# Patient Record
Sex: MALE | Race: WHITE | NOT HISPANIC OR LATINO | Employment: FULL TIME | URBAN - METROPOLITAN AREA
[De-identification: names, ages, dates, MRNs, and addresses within clinical notes are randomized per-mention and may not be internally consistent; named-entity substitution may affect disease eponyms.]

---

## 2017-01-18 ENCOUNTER — OFF PREMISE (OUTPATIENT)
Dept: OTHER | Age: 62
End: 2017-01-18

## 2017-01-25 ENCOUNTER — TELEPHONE (OUTPATIENT)
Dept: GASTROENTEROLOGY | Age: 62
End: 2017-01-25

## 2018-01-01 ENCOUNTER — EXTERNAL RECORD (OUTPATIENT)
Dept: OTHER | Age: 63
End: 2018-01-01

## 2018-09-08 LAB
ALT SERPL-CCNC: 30 U/L (ref 9–46)
AST SERPL-CCNC: 24 U/L (ref 10–35)
CHOLESTEROL (EXTERNAL): 234 MG/DL
CREATININE (EXTERNAL): 0.87 MG/DL (ref 0.7–1.25)
GFR ESTIMATED (EXTERNAL): 92 ML/MIN/1.73M2
GFR ESTIMATED (IF AFRICAN AMERICAN) (EXTERNAL): 107 ML/MIN/1.73M2
GLUCOSE (EXTERNAL): 85 MG/DL (ref 65–99)
HDLC SERPL-MCNC: 51 MG/DL
LDL CHOLESTEROL CALCULATED (EXTERNAL): 157 MG/DL
NON HDL CHOLESTEROL (EXTERNAL): 183 MG/DL
POTASSIUM (EXTERNAL): 4.2 MMOL/L (ref 3.5–5.3)
TRIGLYCERIDES (EXTERNAL): 131 MG/DL

## 2018-09-10 ENCOUNTER — TELEPHONE (OUTPATIENT)
Dept: GASTROENTEROLOGY | Age: 63
End: 2018-09-10

## 2018-09-11 ENCOUNTER — CLINICAL ABSTRACT (OUTPATIENT)
Dept: GASTROENTEROLOGY | Age: 63
End: 2018-09-11

## 2018-09-20 ENCOUNTER — TELEPHONE (OUTPATIENT)
Dept: GASTROENTEROLOGY | Age: 63
End: 2018-09-20

## 2018-09-20 VITALS — HEIGHT: 72 IN | WEIGHT: 186 LBS | BODY MASS INDEX: 25.19 KG/M2

## 2018-09-27 RX ORDER — BISACODYL 5 MG/1
10 TABLET, DELAYED RELEASE ORAL ONCE
Qty: 2 TABLET | Refills: 0 | Status: SHIPPED | OUTPATIENT
Start: 2018-09-27 | End: 2018-09-27

## 2018-10-19 ASSESSMENT — ACTIVITIES OF DAILY LIVING (ADL)
RECENT_DECLINE_ADL: NO
SENSORY_SUPPORT_DEVICES: EYEGLASSES;CONTACTS
NEEDS_ASSIST: NO
CHRONIC_PAIN_PRESENT: NO
ADL_SCORE: 12
ADL_SHORT_OF_BREATH: NO
HISTORY OF FALLING IN THE LAST YEAR (PRIOR TO ADMISSION): NO
ADL_BEFORE_ADMISSION: INDEPENDENT

## 2018-10-19 ASSESSMENT — COGNITIVE AND FUNCTIONAL STATUS - GENERAL
ARE YOU DEAF OR DO YOU HAVE SERIOUS DIFFICULTY  HEARING: NO
ARE YOU BLIND OR DO YOU HAVE SERIOUS DIFFICULTY SEEING, EVEN WHEN WEARING GLASSES: NO

## 2018-10-22 ENCOUNTER — HOSPITAL ENCOUNTER (OUTPATIENT)
Age: 63
Discharge: HOME | End: 2018-10-22
Attending: INTERNAL MEDICINE | Admitting: INTERNAL MEDICINE

## 2018-10-22 ENCOUNTER — PREP FOR CASE (OUTPATIENT)
Dept: GASTROENTEROLOGY | Age: 63
End: 2018-10-22

## 2018-10-22 VITALS
OXYGEN SATURATION: 99 % | TEMPERATURE: 97.9 F | RESPIRATION RATE: 12 BRPM | SYSTOLIC BLOOD PRESSURE: 122 MMHG | HEIGHT: 73 IN | DIASTOLIC BLOOD PRESSURE: 68 MMHG | WEIGHT: 186.07 LBS | BODY MASS INDEX: 24.66 KG/M2 | HEART RATE: 53 BPM

## 2018-10-22 DIAGNOSIS — Z12.11 SPECIAL SCREENING FOR MALIGNANT NEOPLASM OF COLON: Primary | ICD-10-CM

## 2018-10-22 PROBLEM — K63.5 COLON POLYP: Status: ACTIVE | Noted: 2018-10-22

## 2018-10-22 PROCEDURE — G0500 MOD SEDAT ENDO SERVICE >5YRS: HCPCS | Performed by: INTERNAL MEDICINE

## 2018-10-22 PROCEDURE — 45385 COLONOSCOPY W/LESION REMOVAL: CPT | Performed by: INTERNAL MEDICINE

## 2018-10-22 PROCEDURE — 45380 COLONOSCOPY AND BIOPSY: CPT | Performed by: INTERNAL MEDICINE

## 2018-10-22 PROCEDURE — 10003428 HB COLONOSCOPY: Performed by: INTERNAL MEDICINE

## 2018-10-22 PROCEDURE — 10002800 HB RX 250 W HCPCS: Performed by: INTERNAL MEDICINE

## 2018-10-22 PROCEDURE — 88305 TISSUE EXAM BY PATHOLOGIST: CPT

## 2018-10-22 PROCEDURE — 10003428 HB COLONOSCOPY

## 2018-10-22 RX ORDER — MIDAZOLAM HYDROCHLORIDE 1 MG/ML
INJECTION, SOLUTION INTRAMUSCULAR; INTRAVENOUS PRN
Status: DISCONTINUED | OUTPATIENT
Start: 2018-10-22 | End: 2018-10-22 | Stop reason: HOSPADM

## 2018-10-22 ASSESSMENT — ACTIVITIES OF DAILY LIVING (ADL)
RECENT_DECLINE_ADL: NO
ADL_SHORT_OF_BREATH: NO
HISTORY OF FALLING IN THE LAST YEAR (PRIOR TO ADMISSION): NO
CHRONIC_PAIN_PRESENT: NO
ADL_SCORE: 12
NEEDS_ASSIST: NO
ADL_BEFORE_ADMISSION: INDEPENDENT

## 2018-10-22 ASSESSMENT — PAIN SCALES - GENERAL
PAIN_LEVEL_AT_REST: 0

## 2018-10-22 ASSESSMENT — LIFESTYLE VARIABLES: SMOKING_HISTORY: NO

## 2018-10-23 LAB — PATHOLOGIST NAME: NORMAL

## 2018-10-30 ENCOUNTER — TELEPHONE (OUTPATIENT)
Dept: GASTROENTEROLOGY | Age: 63
End: 2018-10-30

## 2018-11-14 LAB
APTT PPP: 37 SEC (ref 22–32)
INR (EXTERNAL): 1.2 (ref 2–3)
INR PPP: 1.2
PROTHROMBIN TIME: 12.4 SEC (ref 9.7–11.8)

## 2019-01-01 ENCOUNTER — EXTERNAL RECORD (OUTPATIENT)
Dept: OTHER | Age: 64
End: 2019-01-01

## 2019-03-18 ENCOUNTER — TELEPHONE (OUTPATIENT)
Dept: HEMATOLOGY/ONCOLOGY | Age: 64
End: 2019-03-18

## 2019-03-18 ENCOUNTER — OFFICE VISIT (OUTPATIENT)
Dept: HEMATOLOGY/ONCOLOGY | Age: 64
End: 2019-03-18
Attending: INTERNAL MEDICINE

## 2019-03-18 DIAGNOSIS — M79.604 PAIN IN BOTH LOWER EXTREMITIES: ICD-10-CM

## 2019-03-18 DIAGNOSIS — M79.605 PAIN IN BOTH LOWER EXTREMITIES: ICD-10-CM

## 2019-03-18 DIAGNOSIS — D68.59 THROMBOPHILIA (CMD): Primary | ICD-10-CM

## 2019-03-18 PROCEDURE — 99244 OFF/OP CNSLTJ NEW/EST MOD 40: CPT | Performed by: INTERNAL MEDICINE

## 2019-03-18 ASSESSMENT — PAIN SCALES - GENERAL: PAINLEVEL: 0

## 2019-03-27 ENCOUNTER — PERMISSIONS (OUTPATIENT)
Dept: HEALTH INFORMATION MANAGEMENT | Age: 64
End: 2019-03-27

## 2019-05-07 ENCOUNTER — LAB SERVICES (OUTPATIENT)
Dept: LAB | Age: 64
End: 2019-05-07

## 2019-05-07 ENCOUNTER — HOSPITAL ENCOUNTER (OUTPATIENT)
Dept: CT IMAGING | Age: 64
Discharge: HOME | End: 2019-05-07
Attending: INTERNAL MEDICINE

## 2019-05-07 ENCOUNTER — HOSPITAL ENCOUNTER (OUTPATIENT)
Dept: ULTRASOUND IMAGING | Age: 64
Discharge: HOME | End: 2019-05-07
Attending: INTERNAL MEDICINE

## 2019-05-07 DIAGNOSIS — D68.59 THROMBOPHILIA (CMD): ICD-10-CM

## 2019-05-07 DIAGNOSIS — M79.605 PAIN IN BOTH LOWER EXTREMITIES: ICD-10-CM

## 2019-05-07 DIAGNOSIS — M79.604 PAIN IN BOTH LOWER EXTREMITIES: ICD-10-CM

## 2019-05-07 LAB
BASOPHILS # BLD AUTO: 0 K/MCL (ref 0–0.3)
BASOPHILS NFR BLD AUTO: 1 %
CREAT SERPL-MCNC: 0.88 MG/DL (ref 0.67–1.17)
DIFFERENTIAL METHOD BLD: ABNORMAL
EOSINOPHIL # BLD AUTO: 0.1 K/MCL (ref 0.1–0.5)
EOSINOPHIL NFR SPEC: 2 %
ERYTHROCYTE [DISTWIDTH] IN BLOOD: 12.7 % (ref 11–15)
HCT VFR BLD CALC: 38.3 % (ref 39–51)
HGB BLD-MCNC: 13.1 G/DL (ref 13–17)
IMM GRANULOCYTES # BLD AUTO: 0 K/MCL (ref 0–0.2)
IMM GRANULOCYTES NFR BLD: 0 %
LYMPHOCYTES # BLD MANUAL: 1.6 K/MCL (ref 1–4)
LYMPHOCYTES NFR BLD MANUAL: 44 %
MCH RBC QN AUTO: 30.6 PG (ref 26–34)
MCHC RBC AUTO-ENTMCNC: 34.2 G/DL (ref 32–36.5)
MCV RBC AUTO: 89.5 FL (ref 78–100)
MONOCYTES # BLD MANUAL: 0.2 K/MCL (ref 0.3–0.9)
MONOCYTES NFR BLD MANUAL: 6 %
NEUTROPHILS # BLD: 1.8 K/MCL (ref 1.8–7.7)
NEUTROPHILS NFR BLD AUTO: 47 %
NRBC BLD MANUAL-RTO: 0 /100 WBC
PLATELET # BLD: 179 K/MCL (ref 140–450)
RBC # BLD: 4.28 MIL/MCL (ref 4.5–5.9)
WBC # BLD: 3.8 K/MCL (ref 4.2–11)

## 2019-05-07 PROCEDURE — 71275 CT ANGIOGRAPHY CHEST: CPT | Performed by: RADIOLOGY

## 2019-05-07 PROCEDURE — 71275 CT ANGIOGRAPHY CHEST: CPT

## 2019-05-07 PROCEDURE — 93970 EXTREMITY STUDY: CPT

## 2019-05-07 PROCEDURE — 10002807 HB RX 258: Performed by: RADIOLOGY

## 2019-05-07 PROCEDURE — 93970 EXTREMITY STUDY: CPT | Performed by: RADIOLOGY

## 2019-05-07 PROCEDURE — 10002805 HB CONTRAST AGENT: Performed by: RADIOLOGY

## 2019-05-07 RX ADMIN — SODIUM CHLORIDE 60 ML: 9 INJECTION, SOLUTION INTRAVENOUS at 15:07

## 2019-05-07 RX ADMIN — IOPAMIDOL 75 ML: 755 INJECTION, SOLUTION INTRAVENOUS at 15:07

## 2019-05-08 LAB
APTT HEX PL PPP: 1 SEC
APTT INHIB SENS PPP: 32.1 SEC (ref 22–32)
CONFIRM DRVVT: 1.63 S
LA 3 SCREEN W REFLEX-IMP: ABNORMAL
MIXING APTT: 29.9 SEC (ref 22–32)
MIXING DRVVT: 61.5 SEC
PATHOLOGIST NAME: ABNORMAL
PROT C ACT/NOR PPP CHRO: 136 % (ref 65–121)
PROT S ACT/NOR PPP: 166 % (ref 65–125)
SCREEN DRVVT: 80.1 SEC
THROMBIN TIME: 18.2 SEC (ref 15.3–21.1)

## 2019-05-09 LAB
CARDIOLIPIN IGA SER IA-ACNC: <20 APL
CARDIOLIPIN IGG SER IA-ACNC: <20 GPL
CARDIOLIPIN IGM SER IA-ACNC: <20 MPL

## 2019-05-10 LAB — PAROXYSMAL NOCTURNAL HEMOGLOBINURIA (PNHMKR): NORMAL

## 2019-05-13 ENCOUNTER — TELEPHONE (OUTPATIENT)
Dept: HEMATOLOGY/ONCOLOGY | Age: 64
End: 2019-05-13

## 2019-05-16 ENCOUNTER — TELEPHONE (OUTPATIENT)
Dept: HEMATOLOGY/ONCOLOGY | Age: 64
End: 2019-05-16

## 2019-05-20 ENCOUNTER — LAB SERVICES (OUTPATIENT)
Dept: HEMATOLOGY/ONCOLOGY | Age: 64
End: 2019-05-20
Attending: INTERNAL MEDICINE

## 2019-05-20 ENCOUNTER — OFFICE VISIT (OUTPATIENT)
Dept: HEMATOLOGY/ONCOLOGY | Age: 64
End: 2019-05-20
Attending: INTERNAL MEDICINE

## 2019-05-20 DIAGNOSIS — D68.59 THROMBOPHILIA (CMD): ICD-10-CM

## 2019-05-20 DIAGNOSIS — D68.59 THROMBOPHILIA (CMD): Primary | ICD-10-CM

## 2019-05-20 PROCEDURE — 85379 FIBRIN DEGRADATION QUANT: CPT

## 2019-05-20 PROCEDURE — 36415 COLL VENOUS BLD VENIPUNCTURE: CPT

## 2019-05-20 PROCEDURE — 99213 OFFICE O/P EST LOW 20 MIN: CPT | Performed by: INTERNAL MEDICINE

## 2019-05-20 ASSESSMENT — PAIN SCALES - GENERAL: PAINLEVEL: 0

## 2019-05-21 LAB — D DIMER PPP FEU-MCNC: <0.19 MG/L (FEU)

## 2019-05-23 ENCOUNTER — TELEPHONE (OUTPATIENT)
Dept: HEMATOLOGY/ONCOLOGY | Age: 64
End: 2019-05-23

## 2019-06-17 ENCOUNTER — LAB SERVICES (OUTPATIENT)
Dept: HEMATOLOGY/ONCOLOGY | Age: 64
End: 2019-06-17
Attending: INTERNAL MEDICINE

## 2019-06-17 DIAGNOSIS — D68.59 THROMBOPHILIA (CMD): ICD-10-CM

## 2019-06-17 PROCEDURE — 85613 RUSSELL VIPER VENOM DILUTED: CPT

## 2019-06-17 PROCEDURE — 85379 FIBRIN DEGRADATION QUANT: CPT

## 2019-06-17 PROCEDURE — 36415 COLL VENOUS BLD VENIPUNCTURE: CPT

## 2019-06-18 LAB
APTT INHIB SENS PPP: 27.1 SEC (ref 22–32)
D DIMER PPP FEU-MCNC: 0.52 MG/L (FEU)
LA 3 SCREEN W REFLEX-IMP: NORMAL
MIXING APTT: 25.9 SEC (ref 22–32)
MIXING DRVVT: 37.5 SEC
PATHOLOGIST NAME: NORMAL
SCREEN DRVVT: 39.5 SEC
THROMBIN TIME: 18 SEC (ref 15.3–21.1)

## 2019-08-19 ENCOUNTER — LAB SERVICES (OUTPATIENT)
Dept: HEMATOLOGY/ONCOLOGY | Age: 64
End: 2019-08-19
Attending: INTERNAL MEDICINE

## 2019-08-19 ENCOUNTER — TRANSFERRED RECORDS (OUTPATIENT)
Dept: HEALTH INFORMATION MANAGEMENT | Facility: CLINIC | Age: 64
End: 2019-08-19

## 2019-08-19 ENCOUNTER — OFFICE VISIT (OUTPATIENT)
Dept: HEMATOLOGY/ONCOLOGY | Age: 64
End: 2019-08-19
Attending: INTERNAL MEDICINE

## 2019-08-19 DIAGNOSIS — D68.59 THROMBOPHILIA (CMD): ICD-10-CM

## 2019-08-19 DIAGNOSIS — D68.59 THROMBOPHILIA (CMD): Primary | ICD-10-CM

## 2019-08-19 LAB — D DIMER PPP FEU-MCNC: 1.41 MG/L (FEU)

## 2019-08-19 PROCEDURE — 36415 COLL VENOUS BLD VENIPUNCTURE: CPT

## 2019-08-19 PROCEDURE — 85379 FIBRIN DEGRADATION QUANT: CPT

## 2019-08-19 PROCEDURE — 99213 OFFICE O/P EST LOW 20 MIN: CPT | Performed by: INTERNAL MEDICINE

## 2019-08-19 ASSESSMENT — PAIN SCALES - GENERAL: PAINLEVEL: 0

## 2019-08-20 ENCOUNTER — TELEPHONE (OUTPATIENT)
Dept: HEMATOLOGY/ONCOLOGY | Age: 64
End: 2019-08-20

## 2019-08-28 LAB
ALT SERPL-CCNC: 23 U/L (ref 9–46)
AST SERPL-CCNC: 31 U/L (ref 10–35)
CHOLESTEROL (EXTERNAL): 212 MG/DL
CREATININE (EXTERNAL): 0.93 MG/DL (ref 0.7–1.25)
GFR ESTIMATED (EXTERNAL): 87 ML/MIN/1.73M2
GFR ESTIMATED (IF AFRICAN AMERICAN) (EXTERNAL): 101 ML/MIN/1.73M2
GLUCOSE (EXTERNAL): 126 MG/DL (ref 65–99)
HDLC SERPL-MCNC: 53 MG/DL
INR (EXTERNAL): 2.1 (ref 0.9–1.1)
LDL CHOLESTEROL CALCULATED (EXTERNAL): ABNORMAL MG/DL
NON HDL CHOLESTEROL (EXTERNAL): 159 MG/DL
POTASSIUM (EXTERNAL): 4.3 MMOL/L (ref 3.5–5.3)
TRIGLYCERIDES (EXTERNAL): 116 MG/DL
TSH SERPL-ACNC: 0.93 MLU/L (ref 0.4–4.5)

## 2020-08-17 ENCOUNTER — OFFICE VISIT (OUTPATIENT)
Dept: HEMATOLOGY/ONCOLOGY | Age: 65
End: 2020-08-17
Attending: INTERNAL MEDICINE

## 2020-08-17 DIAGNOSIS — L98.9 SKIN LESION OF RIGHT ARM: ICD-10-CM

## 2020-08-17 DIAGNOSIS — D68.59 THROMBOPHILIA (CMD): Primary | ICD-10-CM

## 2020-08-17 DIAGNOSIS — M79.89 LEG SWELLING: ICD-10-CM

## 2020-08-17 PROCEDURE — 99213 OFFICE O/P EST LOW 20 MIN: CPT | Performed by: INTERNAL MEDICINE

## 2020-08-17 RX ORDER — WARFARIN SODIUM 5 MG/1
TABLET ORAL
COMMUNITY
Start: 2020-07-30

## 2020-08-17 ASSESSMENT — PAIN SCALES - GENERAL: PAINLEVEL: 1-2

## 2020-08-18 ENCOUNTER — TELEPHONE (OUTPATIENT)
Dept: HEMATOLOGY/ONCOLOGY | Age: 65
End: 2020-08-18

## 2020-08-18 ENCOUNTER — LAB SERVICES (OUTPATIENT)
Dept: LAB | Age: 65
End: 2020-08-18
Attending: INTERNAL MEDICINE

## 2020-08-18 DIAGNOSIS — M79.89 LEG SWELLING: ICD-10-CM

## 2020-08-18 DIAGNOSIS — D68.59 THROMBOPHILIA (CMD): Primary | ICD-10-CM

## 2020-08-18 LAB
ALBUMIN SERPL-MCNC: 3.9 G/DL (ref 3.6–5.1)
ALBUMIN/GLOB SERPL: 1.2 {RATIO} (ref 1–2.4)
ALP SERPL-CCNC: 78 UNITS/L (ref 45–117)
ALT SERPL-CCNC: 26 UNITS/L
ANION GAP SERPL CALC-SCNC: 8 MMOL/L (ref 10–20)
AST SERPL-CCNC: 22 UNITS/L
BASOPHILS # BLD: 0 K/MCL (ref 0–0.3)
BASOPHILS NFR BLD: 1 %
BILIRUB SERPL-MCNC: 0.6 MG/DL (ref 0.2–1)
BUN SERPL-MCNC: 26 MG/DL (ref 6–20)
BUN/CREAT SERPL: 31 (ref 7–25)
CALCIUM SERPL-MCNC: 8.8 MG/DL (ref 8.4–10.2)
CHLORIDE SERPL-SCNC: 105 MMOL/L (ref 98–107)
CO2 SERPL-SCNC: 32 MMOL/L (ref 21–32)
CREAT SERPL-MCNC: 0.84 MG/DL (ref 0.67–1.17)
DEPRECATED RDW RBC: 41.3 FL (ref 39–50)
EOSINOPHIL # BLD: 0.2 K/MCL (ref 0.1–0.5)
EOSINOPHIL NFR BLD: 4 %
ERYTHROCYTE [DISTWIDTH] IN BLOOD: 12.8 % (ref 11–15)
FASTING DURATION TIME PATIENT: ABNORMAL H
GFR SERPLBLD BASED ON 1.73 SQ M-ARVRAT: >90 ML/MIN/1.73M2
GLOBULIN SER-MCNC: 3.3 G/DL (ref 2–4)
GLUCOSE SERPL-MCNC: 89 MG/DL (ref 65–99)
HCT VFR BLD CALC: 41 % (ref 39–51)
HGB BLD-MCNC: 14.1 G/DL (ref 13–17)
LYMPHOCYTES # BLD: 1.6 K/MCL (ref 1–4)
LYMPHOCYTES NFR BLD: 38 %
MCH RBC QN AUTO: 30.8 PG (ref 26–34)
MCHC RBC AUTO-ENTMCNC: 34.4 G/DL (ref 32–36.5)
MCV RBC AUTO: 89.5 FL (ref 78–100)
MONOCYTES # BLD: 0.4 K/MCL (ref 0.3–0.9)
MONOCYTES NFR BLD: 8 %
NEUTROPHILS # BLD: 2.1 K/MCL (ref 1.8–7.7)
NEUTROPHILS NFR BLD: 49 %
PLATELET # BLD AUTO: 179 K/MCL (ref 140–450)
POTASSIUM SERPL-SCNC: 4.1 MMOL/L (ref 3.4–5.1)
PROT SERPL-MCNC: 7.2 G/DL (ref 6.4–8.2)
RAINBOW EXTRA TUBES HOLD SPECIMEN: NORMAL
RBC # BLD: 4.58 MIL/MCL (ref 4.5–5.9)
SODIUM SERPL-SCNC: 141 MMOL/L (ref 135–145)
WBC # BLD: 4.3 K/MCL (ref 4.2–11)

## 2020-08-18 PROCEDURE — 36415 COLL VENOUS BLD VENIPUNCTURE: CPT

## 2020-08-18 PROCEDURE — 85025 COMPLETE CBC W/AUTO DIFF WBC: CPT

## 2020-08-18 PROCEDURE — 80053 COMPREHEN METABOLIC PANEL: CPT

## 2020-08-19 ENCOUNTER — TELEPHONE (OUTPATIENT)
Dept: HEMATOLOGY/ONCOLOGY | Age: 65
End: 2020-08-19

## 2020-09-15 ENCOUNTER — HOSPITAL ENCOUNTER (OUTPATIENT)
Dept: CV DIAGNOSTICS | Age: 65
Discharge: HOME | End: 2020-09-15
Attending: INTERNAL MEDICINE

## 2020-09-15 DIAGNOSIS — M79.89 LEG SWELLING: ICD-10-CM

## 2020-09-15 PROCEDURE — 93306 TTE W/DOPPLER COMPLETE: CPT | Performed by: INTERNAL MEDICINE

## 2020-09-15 PROCEDURE — 93356 MYOCRD STRAIN IMG SPCKL TRCK: CPT

## 2020-09-15 PROCEDURE — 93356 MYOCRD STRAIN IMG SPCKL TRCK: CPT | Performed by: INTERNAL MEDICINE

## 2020-09-15 PROCEDURE — 76376 3D RENDER W/INTRP POSTPROCES: CPT | Performed by: INTERNAL MEDICINE

## 2020-09-16 LAB
AORTIC VALVE AREA: 3.2 CM2
AV MEAN GRADIENT (AVMG): 2.9 MMHG
AV PEAK GRADIENT (AVPG): 5.2 MMHG
AV PEAK VELOCITY (AVPV): 1.1 M/S
DOP CALC LVOT PEAK VEL (LVOTPV): 1 M/S
E WAVE DECELARATION TIME (MDT): 166.9 MS
INTERVENTRICULAR SEPTUM IN END DIASTOLE (IVSD): 1.1 CM
LEFT INTERNAL DIMENSION IN SYSTOLE (LVSD): 3.1 CM
LEFT VENTRICULAR INTERNAL DIMENSION IN DIASTOLE (LVDD): 4.3 CM
LEFT VENTRICULAR POSTERIOR WALL IN END DIASTOLE (LVPW): 1.1 CM
LV EF: 67 %
LV END SYSTOLIC LONGITUDINAL STRAIN GLOBAL (LVGS): -20.4 %
LVOT 2D (LVOTD): 2.1 CM
LVOT VTI (LVOTVTI): 22.7 CM
MV E TISSUE VEL LAT (MELV): 11.5 CM/S
MV E TISSUE VEL MED (MESV): 7.6 CM/S
MV E WAVE VEL/E TISSUE VEL MED(MSR): 6.6
MV PEAK A VELOCITY (MVPAV): 0.3 M/S
MV PEAK E VELOCITY (MVPEV): 0.5 M/S
PV PEAK VELOCITY (PVPV): 0.7 M/S
TV ESTIMATED RIGHT ARTERIAL PRESSURE (RAP): 15 MMHG

## 2020-09-17 ENCOUNTER — OFFICE VISIT (OUTPATIENT)
Dept: DERMATOLOGY | Age: 65
End: 2020-09-17

## 2020-09-17 ENCOUNTER — OFFICE VISIT (OUTPATIENT)
Dept: HEMATOLOGY/ONCOLOGY | Age: 65
End: 2020-09-17
Attending: INTERNAL MEDICINE

## 2020-09-17 VITALS — RESPIRATION RATE: 16 BRPM | HEART RATE: 68 BPM | DIASTOLIC BLOOD PRESSURE: 74 MMHG | SYSTOLIC BLOOD PRESSURE: 118 MMHG

## 2020-09-17 DIAGNOSIS — L82.0 BENIGN LICHENOID KERATOSIS: Primary | ICD-10-CM

## 2020-09-17 DIAGNOSIS — D68.59 THROMBOPHILIA (CMD): Primary | ICD-10-CM

## 2020-09-17 PROCEDURE — 99213 OFFICE O/P EST LOW 20 MIN: CPT | Performed by: INTERNAL MEDICINE

## 2020-09-17 PROCEDURE — 99201 OFFICE/OUTPT VISIT,NEW,LEVL I: CPT | Performed by: DERMATOLOGY

## 2020-09-17 ASSESSMENT — PAIN SCALES - GENERAL: PAINLEVEL: 0

## 2020-11-02 ENCOUNTER — TRANSFERRED RECORDS (OUTPATIENT)
Dept: HEALTH INFORMATION MANAGEMENT | Facility: CLINIC | Age: 65
End: 2020-11-02

## 2020-11-05 ENCOUNTER — TRANSFERRED RECORDS (OUTPATIENT)
Dept: HEALTH INFORMATION MANAGEMENT | Facility: CLINIC | Age: 65
End: 2020-11-05

## 2020-11-05 LAB
ALT SERPL-CCNC: 13 U/L (ref 9–46)
AST SERPL-CCNC: 21 U/L (ref 10–35)
CHOLESTEROL (EXTERNAL): 190 MG/DL
CREATININE (EXTERNAL): 0.95 MG/DL (ref 0.7–1.25)
GFR ESTIMATED (EXTERNAL): 84 ML/MIN/1.73M2
GFR ESTIMATED (IF AFRICAN AMERICAN) (EXTERNAL): 98 ML/MIN/1.73M2
GLUCOSE (EXTERNAL): 84 MG/DL (ref 65–99)
HDLC SERPL-MCNC: 58 MG/DL
LDL CHOLESTEROL CALCULATED (EXTERNAL): 117 MG/DL
NON HDL CHOLESTEROL (EXTERNAL): 132 MG/DL
POTASSIUM (EXTERNAL): 4.4 MMOL/L (ref 3.5–5.3)
TRIGLYCERIDES (EXTERNAL): 63 MG/DL
TSH SERPL-ACNC: NORMAL MLU/L (ref 0.4–4.5)

## 2021-03-09 ENCOUNTER — OFFICE VISIT - HEALTHEAST (OUTPATIENT)
Dept: FAMILY MEDICINE | Facility: CLINIC | Age: 66
End: 2021-03-09

## 2021-03-09 DIAGNOSIS — M53.3 SACROILIAC JOINT DYSFUNCTION: ICD-10-CM

## 2021-03-09 DIAGNOSIS — I26.99 PULMONARY EMBOLISM WITHOUT ACUTE COR PULMONALE, UNSPECIFIED CHRONICITY, UNSPECIFIED PULMONARY EMBOLISM TYPE (H): ICD-10-CM

## 2021-03-09 RX ORDER — WARFARIN SODIUM 5 MG/1
5 TABLET ORAL DAILY
Status: SHIPPED | COMMUNITY
Start: 2020-07-30 | End: 2021-11-22

## 2021-03-09 ASSESSMENT — MIFFLIN-ST. JEOR: SCORE: 1599.78

## 2021-03-09 NOTE — ASSESSMENT & PLAN NOTE
History and exam is most consistent with left-sided SI joint dysfunction.  Symptoms have actually subsided somewhat over the past fewdays.  No red flag symptoms associated with spinal stenosis.  Other than stretching, he has not implemented any measures to reduce pain.  Given his improvement over the past couple of days, the patient would like to see if the improvement continues to resolution.  If not, he will take the Medrol Dosepak.  We discussed potential side effects.  He has not had imaging which seems appropriate given his history thus far.  If not improving inthe next few weeks, the neck step for this patient would be physical therapy (or referral to spine care clinic).  He may respond favorably to a steroid injection to the left SI joint.

## 2021-03-09 NOTE — ASSESSMENT & PLAN NOTE
Historic diagnosis.  Warfarin management through his previous health system.  Avoid NSAIDs as he is chronically anticoagulated.

## 2021-03-11 ENCOUNTER — APPOINTMENT (OUTPATIENT)
Dept: DERMATOLOGY | Age: 66
End: 2021-03-11

## 2021-05-21 VITALS
DIASTOLIC BLOOD PRESSURE: 70 MMHG | SYSTOLIC BLOOD PRESSURE: 118 MMHG | TEMPERATURE: 97.8 F | DIASTOLIC BLOOD PRESSURE: 61 MMHG | TEMPERATURE: 98.1 F | WEIGHT: 179.68 LBS | OXYGEN SATURATION: 98 % | HEART RATE: 70 BPM | HEART RATE: 59 BPM | HEART RATE: 58 BPM | WEIGHT: 167.88 LBS | SYSTOLIC BLOOD PRESSURE: 132 MMHG | BODY MASS INDEX: 22.99 KG/M2 | SYSTOLIC BLOOD PRESSURE: 124 MMHG | RESPIRATION RATE: 12 BRPM | OXYGEN SATURATION: 98 % | WEIGHT: 181.22 LBS | WEIGHT: 181.77 LBS | TEMPERATURE: 98 F | BODY MASS INDEX: 23.58 KG/M2 | TEMPERATURE: 97.5 F | OXYGEN SATURATION: 98 % | HEART RATE: 71 BPM | OXYGEN SATURATION: 98 % | HEIGHT: 72 IN | RESPIRATION RATE: 16 BRPM | BODY MASS INDEX: 24.6 KG/M2 | SYSTOLIC BLOOD PRESSURE: 130 MMHG | TEMPERATURE: 98 F | HEART RATE: 72 BPM | DIASTOLIC BLOOD PRESSURE: 73 MMHG | RESPIRATION RATE: 16 BRPM | DIASTOLIC BLOOD PRESSURE: 74 MMHG | WEIGHT: 172.18 LBS | BODY MASS INDEX: 24.62 KG/M2 | OXYGEN SATURATION: 97 % | RESPIRATION RATE: 12 BRPM | SYSTOLIC BLOOD PRESSURE: 123 MMHG | DIASTOLIC BLOOD PRESSURE: 87 MMHG | BODY MASS INDEX: 24.82 KG/M2

## 2021-06-05 VITALS
OXYGEN SATURATION: 98 % | DIASTOLIC BLOOD PRESSURE: 72 MMHG | HEART RATE: 84 BPM | TEMPERATURE: 98.3 F | WEIGHT: 173 LBS | SYSTOLIC BLOOD PRESSURE: 126 MMHG | BODY MASS INDEX: 23.43 KG/M2 | RESPIRATION RATE: 20 BRPM | HEIGHT: 72 IN

## 2021-06-15 NOTE — PROGRESS NOTES
"Assessment/Plan:    Pulmonary emboli (H)  Historic diagnosis.  Warfarin management through his previous health system.  Avoid NSAIDs as he is chronically anticoagulated.    Sacroiliac joint dysfunction  History and exam is most consistent with left-sided SI joint dysfunction.  Symptoms have actually subsided somewhat over the past few days.  No red flag symptoms associated with spinal stenosis.  Other than stretching, he has not implemented any measures to reduce pain.  Given his improvement over the past couple of days, the patient would like to see if the improvement continues to resolution.  If not, he will take the Medrol Dosepak.  We discussed potential side effects.  He has not had imaging which seems appropriate given his history thus far.  If not improving in the next few weeks, the neck step for this patient would be physical therapy (or referral to spine care clinic).  He may respond favorably to a steroid injection to the left SI joint.    Return in about 4 weeks (around 4/6/2021) for recheck if not improving.    Dl Allen MD  _______________________________    Chief Complaint   Patient presents with     Back Pain     x 2 months, pt hurt his back working on his bathroom      Medication Education Visit     pt has questions about Warfarin      Subjective: Matt Rodriguez is a 65 y.o. year old male who returns to clinic for the following chronic complaints/concerns:     Back Pain:   - left low back.     - duration: 2 months   - started while working in bathroom (\"prying out things.\").  He has pain when getting out of bed.   - pain with flexion at the right hip.     - he feels more comfortable standing.     - no radiation into the legs.     - no saddle anesthesia, weakness (other than the pain), no urinary incontinence   - job is sedentary.  This has lead to more pain in other joints.  Ongoing neck pain.  \"everything is sore.\"   - feet have been \"full, purple.\"  He wonders if this is related to the " "change in generic  changing.    - better today.   - in the past he enjoyed walking over the lunch hour.   - shuffling the whole way.   - warfarin has been mostly therapeutic range.     Review of systems is negative except for as shown in the HPI.    The following portions of the patient's history were reviewed and updated as appropriate: allergies, current medications, past family history, past medical history, past social history, past surgical history and problem list.    Objective:    height is 5' 11.5\" (1.816 m) and weight is 173 lb (78.5 kg). His oral temperature is 98.3  F (36.8  C). His blood pressure is 126/72 and his pulse is 84. His respiration is 20 and oxygen saturation is 98%.   Physical Exam    Gen: nad  Msk/neuro: The thoracic spine is nontender to palpation.  The lumbar spine and sacral spine are nontender to palpation.  The paraspinous muscles bilaterally are nontender to palpation.  The left SI joint is tender to palpation without swelling.  The right SI joint is nontender to palpation.  Sensation is intact throughout the legs in all dermatomal distribution including the medial thigh.  2+ reflexes at the patella bilaterally.  The patient walks with a normal gait.  Negative straight leg raise bilaterally.  The patient is able to walk on his toes and his heels without difficulty.  Strength 5/5 in lower extremities bilaterally.    No data recorded  No data recorded  No data recorded  No data recorded    No results found for this or any previous visit (from the past 24 hour(s)).    This note has been dictated using voice recognition software. Any grammatical or context distortions are unintentional and inherent to the software  "

## 2021-06-16 PROBLEM — M53.3 SACROILIAC JOINT DYSFUNCTION: Status: ACTIVE | Noted: 2021-03-09

## 2021-06-16 PROBLEM — I26.99 PULMONARY EMBOLI (H): Status: ACTIVE | Noted: 2018-11-07

## 2021-06-16 PROBLEM — K63.5 COLON POLYP: Status: ACTIVE | Noted: 2018-10-22

## 2021-08-22 ENCOUNTER — HEALTH MAINTENANCE LETTER (OUTPATIENT)
Age: 66
End: 2021-08-22

## 2021-09-01 ENCOUNTER — TELEPHONE (OUTPATIENT)
Dept: HEMATOLOGY/ONCOLOGY | Age: 66
End: 2021-09-01

## 2021-09-15 ENCOUNTER — APPOINTMENT (OUTPATIENT)
Dept: LAB | Age: 66
End: 2021-09-15
Attending: INTERNAL MEDICINE

## 2021-09-15 ENCOUNTER — APPOINTMENT (OUTPATIENT)
Dept: HEMATOLOGY/ONCOLOGY | Age: 66
End: 2021-09-15
Attending: INTERNAL MEDICINE

## 2021-09-17 ENCOUNTER — TELEPHONE (OUTPATIENT)
Dept: HEMATOLOGY/ONCOLOGY | Age: 66
End: 2021-09-17

## 2021-09-17 DIAGNOSIS — D68.59 THROMBOPHILIA (CMD): Primary | ICD-10-CM

## 2021-09-21 ENCOUNTER — TELEPHONE (OUTPATIENT)
Dept: HEMATOLOGY/ONCOLOGY | Age: 66
End: 2021-09-21

## 2021-09-21 ENCOUNTER — LAB SERVICES (OUTPATIENT)
Dept: LAB | Age: 66
End: 2021-09-21
Attending: INTERNAL MEDICINE

## 2021-09-21 ENCOUNTER — OFFICE VISIT (OUTPATIENT)
Dept: HEMATOLOGY/ONCOLOGY | Age: 66
End: 2021-09-21
Attending: INTERNAL MEDICINE

## 2021-09-21 VITALS
OXYGEN SATURATION: 100 % | SYSTOLIC BLOOD PRESSURE: 121 MMHG | BODY MASS INDEX: 22.99 KG/M2 | RESPIRATION RATE: 14 BRPM | WEIGHT: 167.88 LBS | DIASTOLIC BLOOD PRESSURE: 77 MMHG | HEART RATE: 50 BPM

## 2021-09-21 DIAGNOSIS — D68.59 THROMBOPHILIA (CMD): ICD-10-CM

## 2021-09-21 DIAGNOSIS — D68.59 THROMBOPHILIA (CMD): Primary | ICD-10-CM

## 2021-09-21 DIAGNOSIS — K63.5 POLYP OF COLON, UNSPECIFIED PART OF COLON, UNSPECIFIED TYPE: Primary | ICD-10-CM

## 2021-09-21 LAB
ALBUMIN SERPL-MCNC: 3.9 G/DL (ref 3.6–5.1)
ALBUMIN/GLOB SERPL: 1.2 {RATIO} (ref 1–2.4)
ALP SERPL-CCNC: 82 UNITS/L (ref 45–117)
ALT SERPL-CCNC: 27 UNITS/L
ANION GAP SERPL CALC-SCNC: 11 MMOL/L (ref 10–20)
AST SERPL-CCNC: 23 UNITS/L
BASOPHILS # BLD: 0 K/MCL (ref 0–0.3)
BASOPHILS NFR BLD: 0 %
BILIRUB SERPL-MCNC: 0.8 MG/DL (ref 0.2–1)
BUN SERPL-MCNC: 22 MG/DL (ref 6–20)
BUN/CREAT SERPL: 25 (ref 7–25)
CALCIUM SERPL-MCNC: 8.8 MG/DL (ref 8.4–10.2)
CHLORIDE SERPL-SCNC: 106 MMOL/L (ref 98–107)
CO2 SERPL-SCNC: 33 MMOL/L (ref 21–32)
CREAT SERPL-MCNC: 0.88 MG/DL (ref 0.67–1.17)
DEPRECATED RDW RBC: 43.1 FL (ref 39–50)
EOSINOPHIL # BLD: 0.1 K/MCL (ref 0–0.5)
EOSINOPHIL NFR BLD: 2 %
ERYTHROCYTE [DISTWIDTH] IN BLOOD: 13 % (ref 11–15)
FASTING DURATION TIME PATIENT: ABNORMAL H
GFR SERPLBLD BASED ON 1.73 SQ M-ARVRAT: 90 ML/MIN
GLOBULIN SER-MCNC: 3.3 G/DL (ref 2–4)
GLUCOSE SERPL-MCNC: 93 MG/DL (ref 65–99)
HCT VFR BLD CALC: 43.3 % (ref 39–51)
HGB BLD-MCNC: 14.3 G/DL (ref 13–17)
IMM GRANULOCYTES # BLD AUTO: 0 K/MCL (ref 0–0.2)
IMM GRANULOCYTES # BLD: 0 %
LYMPHOCYTES # BLD: 1.7 K/MCL (ref 1–4)
LYMPHOCYTES NFR BLD: 32 %
MCH RBC QN AUTO: 29.7 PG (ref 26–34)
MCHC RBC AUTO-ENTMCNC: 33 G/DL (ref 32–36.5)
MCV RBC AUTO: 90 FL (ref 78–100)
MONOCYTES # BLD: 0.4 K/MCL (ref 0.3–0.9)
MONOCYTES NFR BLD: 7 %
NEUTROPHILS # BLD: 3.1 K/MCL (ref 1.8–7.7)
NEUTROPHILS NFR BLD: 59 %
NRBC BLD MANUAL-RTO: 0 /100 WBC
PLATELET # BLD AUTO: 181 K/MCL (ref 140–450)
POTASSIUM SERPL-SCNC: 4.6 MMOL/L (ref 3.4–5.1)
PROT SERPL-MCNC: 7.2 G/DL (ref 6.4–8.2)
RAINBOW EXTRA TUBES HOLD SPECIMEN: NORMAL
RBC # BLD: 4.81 MIL/MCL (ref 4.5–5.9)
SODIUM SERPL-SCNC: 145 MMOL/L (ref 135–145)
WBC # BLD: 5.3 K/MCL (ref 4.2–11)

## 2021-09-21 PROCEDURE — 99213 OFFICE O/P EST LOW 20 MIN: CPT | Performed by: INTERNAL MEDICINE

## 2021-09-21 PROCEDURE — 36415 COLL VENOUS BLD VENIPUNCTURE: CPT

## 2021-09-21 PROCEDURE — 85025 COMPLETE CBC W/AUTO DIFF WBC: CPT

## 2021-09-21 PROCEDURE — 80053 COMPREHEN METABOLIC PANEL: CPT

## 2021-09-21 ASSESSMENT — PAIN SCALES - GENERAL: PAINLEVEL: 0

## 2021-10-12 ENCOUNTER — TRANSFERRED RECORDS (OUTPATIENT)
Dept: HEALTH INFORMATION MANAGEMENT | Facility: CLINIC | Age: 66
End: 2021-10-12

## 2021-10-12 ENCOUNTER — OFFICE VISIT (OUTPATIENT)
Dept: FAMILY MEDICINE | Facility: CLINIC | Age: 66
End: 2021-10-12
Payer: COMMERCIAL

## 2021-10-12 VITALS
TEMPERATURE: 97.9 F | HEIGHT: 73 IN | SYSTOLIC BLOOD PRESSURE: 120 MMHG | WEIGHT: 172 LBS | BODY MASS INDEX: 22.8 KG/M2 | DIASTOLIC BLOOD PRESSURE: 80 MMHG | OXYGEN SATURATION: 98 % | RESPIRATION RATE: 16 BRPM | HEART RATE: 72 BPM

## 2021-10-12 DIAGNOSIS — Z13.220 SCREENING FOR LIPID DISORDERS: ICD-10-CM

## 2021-10-12 DIAGNOSIS — L98.9 SKIN LESION: ICD-10-CM

## 2021-10-12 DIAGNOSIS — Z12.11 COLON CANCER SCREENING: ICD-10-CM

## 2021-10-12 DIAGNOSIS — D68.59 THROMBOPHILIA (H): ICD-10-CM

## 2021-10-12 DIAGNOSIS — K63.5 POLYP OF COLON, UNSPECIFIED PART OF COLON, UNSPECIFIED TYPE: ICD-10-CM

## 2021-10-12 DIAGNOSIS — Z00.00 ENCOUNTER FOR MEDICARE ANNUAL WELLNESS EXAM: Primary | ICD-10-CM

## 2021-10-12 DIAGNOSIS — Z13.1 SCREENING FOR DIABETES MELLITUS: ICD-10-CM

## 2021-10-12 DIAGNOSIS — Z12.5 SCREENING FOR PROSTATE CANCER: ICD-10-CM

## 2021-10-12 LAB
CHOLEST SERPL-MCNC: 210 MG/DL
FASTING STATUS PATIENT QL REPORTED: YES
FASTING STATUS PATIENT QL REPORTED: YES
GLUCOSE BLD-MCNC: 83 MG/DL (ref 70–125)
HDLC SERPL-MCNC: 61 MG/DL
INR PPP: 3.1 (ref 0.85–1.15)
LDLC SERPL CALC-MCNC: 132 MG/DL
PSA SERPL-MCNC: 1.54 UG/L (ref 0–4.5)
TRIGL SERPL-MCNC: 87 MG/DL

## 2021-10-12 PROCEDURE — 80061 LIPID PANEL: CPT | Performed by: FAMILY MEDICINE

## 2021-10-12 PROCEDURE — 85610 PROTHROMBIN TIME: CPT | Performed by: FAMILY MEDICINE

## 2021-10-12 PROCEDURE — 36415 COLL VENOUS BLD VENIPUNCTURE: CPT | Performed by: FAMILY MEDICINE

## 2021-10-12 PROCEDURE — G0103 PSA SCREENING: HCPCS | Performed by: FAMILY MEDICINE

## 2021-10-12 PROCEDURE — 99397 PER PM REEVAL EST PAT 65+ YR: CPT | Performed by: FAMILY MEDICINE

## 2021-10-12 PROCEDURE — 82947 ASSAY GLUCOSE BLOOD QUANT: CPT | Performed by: FAMILY MEDICINE

## 2021-10-12 ASSESSMENT — ENCOUNTER SYMPTOMS
HEARTBURN: 0
MYALGIAS: 0
COUGH: 0
PARESTHESIAS: 0
WEAKNESS: 0
NAUSEA: 0
SHORTNESS OF BREATH: 0
FREQUENCY: 0
JOINT SWELLING: 0
FEVER: 0
SORE THROAT: 0
ARTHRALGIAS: 0
DIARRHEA: 0
CHILLS: 0
HEADACHES: 0
PALPITATIONS: 0
HEMATOCHEZIA: 0
DIZZINESS: 0
ABDOMINAL PAIN: 0
DYSURIA: 0
NERVOUS/ANXIOUS: 0
HEMATURIA: 0
EYE PAIN: 0
CONSTIPATION: 0

## 2021-10-12 ASSESSMENT — ACTIVITIES OF DAILY LIVING (ADL): CURRENT_FUNCTION: NO ASSISTANCE NEEDED

## 2021-10-12 ASSESSMENT — MIFFLIN-ST. JEOR: SCORE: 1619.07

## 2021-10-12 NOTE — PROGRESS NOTES
"SUBJECTIVE:   Matt Rodriguez is a 66 year old male who presents for Preventive Visit.  Patient has been advised of split billing requirements and indicates understanding: Yes   Are you in the first 12 months of your Medicare coverage?  No    Anticoagulation:   - transfering care to [place].  History of PE.     - was told that he should not get off warfarin.   - he reports that he feels \"clumsy\" when his INR is elevated.    Eyes: vision has been changed.    Feet concern:   - \"they don't look like they used to.\"    - no history of clots in his legs.   - he is concerned about bad circulation.  History of knee injury.     - \"dad had circulation problems.\"  \"He couldn't walk.\"    - he does not report swelling.     History of polyps.  Most recent colonoscopy was 3 years ago.  Due this morning for follow-up.      Healthy Habits:     In general, how would you rate your overall health?  Good    Frequency of exercise:  4-5 days/week    Duration of exercise:  45-60 minutes    Do you usually eat at least 4 servings of fruit and vegetables a day, include whole grains    & fiber and avoid regularly eating high fat or \"junk\" foods?  No    Taking medications regularly:  Yes    Medication side effects:  Not applicable    Ability to successfully perform activities of daily living:  No assistance needed    Home Safety:  No safety concerns identified    Hearing Impairment:  No hearing concerns    In the past 6 months, have you been bothered by leaking of urine?  No    In general, how would you rate your overall mental or emotional health?  Fair      PHQ-2 Total Score: 1    Additional concerns today:  No    Do you feel safe in your environment? Yes    Have you ever done Advance Care Planning? (For example, a Health Directive, POLST, or a discussion with a medical provider or your loved ones about your wishes): No, advance care planning information given to patient to review.  Patient plans to discuss their wishes with loved ones or provider.  "     2) Clock draw: NORMAL  3) 3 item recall: Recalls 3 objects  Results: NORMAL clock, 1-2 items recalled: COGNITIVE IMPAIRMENT LESS LIKELY    Mini-CogTM Copyright ANANTH Navarro. Licensed by the author for use in [facility]; reprinted with permission (johnnie@[facility]). All rights reserved.      Do you have sleep apnea, excessive snoring or daytime drowsiness?: no    Reviewed and updated as needed this visit by clinical staff  Tobacco  Allergies  Meds  Problems  Med Hx  Surg Hx  Fam Hx  Soc Hx          Reviewed and updated as needed this visit by Provider  Tobacco  Allergies  Meds  Problems  Med Hx  Surg Hx  Fam Hx  Soc Hx         Social History     Tobacco Use     Smoking status: Never Smoker     Smokeless tobacco: Never Used   Substance Use Topics     Alcohol use: Yes     Comment: 2 drinks per week     Alcohol Use 10/12/2021   Prescreen: >3 drinks/day or >7 drinks/week? No     Colonoscopy done on this date: 10/2018 (approximately) with polyp.    Current providers sharing in care for this patient include:   Patient Care Team:  Dl Allen MD as PCP - General (Family Medicine)  Dl Allen MD as Assigned PCP    The following health maintenance items are reviewed in Epic and correct as of today:  Health Maintenance Due   Topic Date Due     COLORECTAL CANCER SCREENING  Never done     LIPID  Never done     Pneumococcal Vaccine: 65+ Years (1 of 1 - PPSV23) 06/03/2021     Labs reviewed in EPIC    Review of Systems   Constitutional: Negative for chills and fever.   HENT: Negative for congestion, ear pain, hearing loss and sore throat.    Eyes: Negative for pain and visual disturbance.   Respiratory: Negative for cough and shortness of breath.    Cardiovascular: Negative for chest pain, palpitations and peripheral edema.   Gastrointestinal: Negative for abdominal pain, constipation, diarrhea, heartburn, hematochezia and nausea.   Genitourinary: Negative for discharge, dysuria, frequency,  "genital sores, hematuria, impotence and urgency.   Musculoskeletal: Negative for arthralgias, joint swelling and myalgias.   Skin: Negative for rash.   Neurological: Negative for dizziness, weakness, headaches and paresthesias.   Psychiatric/Behavioral: Negative for mood changes. The patient is not nervous/anxious.        OBJECTIVE:   /80 (BP Location: Left arm, Patient Position: Chair, Cuff Size: Adult Large)   Pulse 72   Temp 97.9  F (36.6  C) (Oral)   Resp 16   Ht 1.854 m (6' 1\")   Wt 78 kg (172 lb)   SpO2 98%   BMI 22.69 kg/m   Estimated body mass index is 22.69 kg/m  as calculated from the following:    Height as of this encounter: 1.854 m (6' 1\").    Weight as of this encounter: 78 kg (172 lb).  Physical Exam  Vitals reviewed.   Constitutional:       General: He is not in acute distress.     Appearance: Normal appearance.   HENT:      Head: Normocephalic and atraumatic.      Right Ear: External ear normal.      Left Ear: External ear normal.      Nose: Nose normal.      Mouth/Throat:      Pharynx: No oropharyngeal exudate or posterior oropharyngeal erythema.   Eyes:      General: No scleral icterus.  Cardiovascular:      Rate and Rhythm: Normal rate and regular rhythm.      Heart sounds: Normal heart sounds. No murmur heard.   No friction rub. No gallop.    Pulmonary:      Effort: Pulmonary effort is normal. No respiratory distress.      Breath sounds: No wheezing.   Abdominal:      General: Bowel sounds are normal. There is no distension.      Palpations: Abdomen is soft. There is no mass.      Tenderness: There is no abdominal tenderness.   Musculoskeletal:         General: No swelling. Normal range of motion.      Cervical back: Normal range of motion.   Skin:     Findings: No rash.   Neurological:      General: No focal deficit present.      Mental Status: He is alert and oriented to person, place, and time.      Cranial Nerves: No cranial nerve deficit.      Deep Tendon Reflexes: Reflexes " "normal.   Psychiatric:         Mood and Affect: Mood normal.         Behavior: Behavior normal.         Thought Content: Thought content normal.         Judgment: Judgment normal.         Diagnostic Test Results:  Labs reviewed in Epic    ASSESSMENT / PLAN:     Thrombophilia (H)  History of pulmonary embolism.  Follows with oncology.  Notes reviewed from September visit.  Referral to INR clinic placed today in order for his warfarin management to be handled locally.    Encounter for Medicare annual wellness exam  This is the patient's establish care visit.  He has concerns that his feet \"do not look like they used to\" with some sensation of his comfort.  He struggles to find language to describe it.  His exam is actually fairly normal with normal monofilament exam and normal vibratory sense.  Normal distribution of hair.  We discussed the potential for AMARI testing but will defer for now.  I doubt that these concerns are a result of warfarin.  -This individual is due for colonoscopy.  He had polyps 3 years ago.  We discussed simply holding his warfarin in preparation.  He reported that he was comfortable with that plan.  -Fasting lab work today.  He has had lipid testing in the past.  -Mild obstructive symptoms of the lower urinary tract.  He reports that he is previously had PSA testing.  Check PSA today.  -He reports that he has previously received the pneumococcal vaccination.  Will request records and update our records once we have the date of that vaccination administration.  -We will defer HIV/hepatitis C testing as he is low risk and declines this testing.    Colon polyp  Colon cancer screening ordered as part of today's visit.    Patient has been advised of split billing requirements and indicates understanding: Yes  COUNSELING:  Reviewed preventive health counseling, as reflected in patient instructions       Regular exercise       Healthy diet/nutrition       Hepatitis C screening       HIV screening for " "high risk patient       Colon cancer screening       Prostate cancer screening    Estimated body mass index is 22.69 kg/m  as calculated from the following:    Height as of this encounter: 1.854 m (6' 1\").    Weight as of this encounter: 78 kg (172 lb).    He reports that he has never smoked. He has never used smokeless tobacco.    Appropriate preventive services were discussed with this patient, including applicable screening as appropriate for cardiovascular disease, diabetes, osteopenia/osteoporosis, and glaucoma.  As appropriate for age/gender, discussed screening for colorectal cancer, prostate cancer, breast cancer, and cervical cancer. Checklist reviewing preventive services available has been given to the patient.    Reviewed patients plan of care and provided an AVS. The Basic Care Plan (routine screening as documented in Health Maintenance) for Matt meets the Care Plan requirement. This Care Plan has been established and reviewed with the Patient.    Counseling Resources:  ATP IV Guidelines  Pooled Cohorts Equation Calculator  Breast Cancer Risk Calculator  Breast Cancer: Medication to Reduce Risk  FRAX Risk Assessment  ICSI Preventive Guidelines  Dietary Guidelines for Americans, 2010  USDA's MyPlate  ASA Prophylaxis  Lung CA Screening    Dl Allen MD  [facility]    Identified Health Risks:  "

## 2021-10-12 NOTE — ASSESSMENT & PLAN NOTE
History of pulmonary embolism.  Follows with oncology.  Notes reviewed from September visit.  Referral to INR clinic placed today in order for his warfarin management to be handled locally.

## 2021-10-12 NOTE — ASSESSMENT & PLAN NOTE
"This is the patient's establish care visit.  He has concerns that his feet \"do not look like they used to\" with some sensation of his comfort.  He struggles to find language to describe it.  His exam is actually fairly normal with normal monofilament exam and normal vibratory sense.  Normal distribution of hair.  We discussed the potential for AMARI testing but will defer for now.  I doubt that these concerns are a result of warfarin.  -This individual is due for colonoscopy.  He had polyps 3 years ago.  We discussed simply holding his warfarin in preparation.  He reported that he was comfortable with that plan.  -Fasting lab work today.  He has had lipid testing in the past.  -Mild obstructive symptoms of the lower urinary tract.  He reports that he is previously had PSA testing.  Check PSA today.  -He reports that he has previously received the pneumococcal vaccination.  Will request records and update our records once we have the date of that vaccination administration.  -We will defer HIV/hepatitis C testing as he is low risk and declines this testing.  "

## 2021-10-12 NOTE — PATIENT INSTRUCTIONS
Patient Education   Personalized Prevention Plan  You are due for the preventive services outlined below.  Your care team is available to assist you in scheduling these services.  If you have already completed any of these items, please share that information with your care team to update in your medical record.  Health Maintenance Due   Topic Date Due     ANNUAL REVIEW OF HM ORDERS  Never done     Discuss Advance Care Planning  Never done     Colorectal Cancer Screening  Never done     HIV Screening  Never done     Hepatitis C Screening  Never done     Diptheria Tetanus Pertussis (DTAP/TDAP/TD) Vaccine (1 - Tdap) Never done     Cholesterol Lab  Never done     FALL RISK ASSESSMENT  Never done     AORTIC ANEURYSM SCREENING (SYSTEM ASSIGNED)  Never done     Pneumococcal Vaccine (1 of 1 - PPSV23) 06/03/2021

## 2021-10-13 ENCOUNTER — TELEPHONE (OUTPATIENT)
Dept: FAMILY MEDICINE | Facility: CLINIC | Age: 66
End: 2021-10-13

## 2021-10-13 ENCOUNTER — ANTICOAGULATION THERAPY VISIT (OUTPATIENT)
Dept: ANTICOAGULATION | Facility: CLINIC | Age: 66
End: 2021-10-13

## 2021-10-13 DIAGNOSIS — D68.59 THROMBOPHILIA (H): Primary | ICD-10-CM

## 2021-10-13 NOTE — TELEPHONE ENCOUNTER
Reason for call:  Other   Patient called regarding (reason for call): call back  Additional comments: Pt called back for INR    Phone number to reach patient:  Home number on file [phone] (home)    Best Time:  any    Can we leave a detailed message on this number?  YES    Travel screening: Not Applicable

## 2021-10-13 NOTE — PROGRESS NOTES
ANTICOAGULATION MANAGEMENT     Matt TAY Marjoriecristine 66 year old male is on warfarin with supratherapeutic INR result. (Goal INR 2.0-3.0)    Recent labs: (last 7 days)     10/12/21  0759   INR 3.10*       ASSESSMENT     Source(s): Chart Review and Patient/Caregiver Call       Warfarin doses taken: Warfarin taken as instructed, although patient is unable to specify how long he has been taking this dose or when it was last changed (I.e. if an adjustment was made last week when he reports to writer that his INR was 3.5)    Diet: No new diet changes identified    New illness, injury, or hospitalization: No    Medication/supplement changes: None noted    Signs or symptoms of bleeding or clotting: No    Previous INR: per patient reports it was 2.8 the day before with his home monitor, last week it was 3.5    Additional findings: Patient has transferred primary care to [facility], yesterday was his visit to establish. He is not new to warfarin. He was previously on Xarelto for a time but his previous PCP cautioned him about reversal in emergency situations and felt that warfarin was a better fit for him.      PLAN     Recommended plan for no diet, medication or health factor changes affecting INR     Dosing Instructions: Continue your current warfarin dose with next INR in 2 weeks at the lab, unless we are able to get his home monitor transferred to [facility] within that time. Did discuss that we are not able to accept self-reported INR results and we will likely have to send new orders to [facility] to get his results sent here. Did ask patient to call [facility] to see what they might need to transfer PCP.       Summary  As of 10/13/2021    Full warfarin instructions:  5 mg every day   Next INR check:  10/26/2021             Telephone call with Matt who verbalizes understanding and agrees to plan    Patient offered & declined to schedule next visit    Education provided: Importance of consistent vitamin K intake and Goal range and significance of  current result    Plan made per [facility] anticoagulation protocol    Danii Abdul RN  Anticoagulation Clinic  10/13/2021    _______________________________________________________________________     Anticoagulation Episode Summary     Current INR goal:  2.0-3.0   TTR:  --   Target end date:  Indefinite   Send INR reminders to:  [facility]    Indications    Thrombophilia (H) [D68.59]           Comments:           Anticoagulation Care Providers     Provider Role Specialty Phone number    Dl Allen MD Referring Family Medicine [phone]

## 2021-10-26 DIAGNOSIS — Z11.59 ENCOUNTER FOR SCREENING FOR OTHER VIRAL DISEASES: ICD-10-CM

## 2021-11-03 ENCOUNTER — TELEPHONE (OUTPATIENT)
Dept: ANTICOAGULATION | Facility: CLINIC | Age: 66
End: 2021-11-03

## 2021-11-03 NOTE — LETTER
Matt Rodriguez  [address] [place] [address]      You are currently under the care of [facility] for your warfarin (Coumadin ) therapy.  We are contacting you because our records show you were due for an INR on 10*/26/21.    There are potentially serious risks when taking warfarin without careful monitoring and we want to make sure you are safely managed.  Routine INR monitoring is required for warfarin refills.     Please call [phone] as soon as possible to schedule an appointment.  If there has been a change in your care or other concerns, please let us know so we can help and or update our records.     Sincerely,       [facility]

## 2021-11-03 NOTE — TELEPHONE ENCOUNTER
ANTICOAGULATION     Matt Rodriguez is overdue for INR check.      Was unable to reach patient and was unable to leave a voicemail. If patient calls, please schedule INR check as soon as possible.    Letter sent    Laura Callahan RN

## 2021-11-08 LAB — INR (EXTERNAL): 2.6 (ref 0.1–1.1)

## 2021-11-10 ENCOUNTER — DOCUMENTATION ONLY (OUTPATIENT)
Dept: ANTICOAGULATION | Facility: CLINIC | Age: 66
End: 2021-11-10
Payer: COMMERCIAL

## 2021-11-10 ENCOUNTER — TELEPHONE (OUTPATIENT)
Dept: ANTICOAGULATION | Facility: CLINIC | Age: 66
End: 2021-11-10
Payer: COMMERCIAL

## 2021-11-10 DIAGNOSIS — D68.59 THROMBOPHILIA (H): Primary | ICD-10-CM

## 2021-11-10 NOTE — TELEPHONE ENCOUNTER
ANTICOAGULATION     Matt Rodriguez is overdue for INR check.      Left message  reminding patient to check INR with their home meter and call results to the home monitoring company as soon as possible.     Laura Callahan RN

## 2021-11-10 NOTE — PROGRESS NOTES
ANTICOAGULATION  MANAGEMENT-Home Monitor Managed by Exception    Matt Rodriguez 66 year old male is on warfarin with therapeutic INR result. (Goal INR 2.0-3.0)    Recent labs: (last 7 days)     11/08/21  0000   INR 2.6*         Previous INR was Therapeutic    Medication, diet, health changes since last INR:chart reviewed; none identified    Contacted within the last 12 weeks by phone on 11/10    This result phoned in to us by Matt has not yet come to us from [employer]. Matt did call [employer] about this         PLAN     Matt was NOT contacted regarding therapeutic result today per home monitoring policy manage by exception agreement.   Current warfarin dose is to be continued:     Summary  As of 11/10/2021    Full warfarin instructions:  5 mg every day   Next INR check:  11/15/2021           ?   Laura Callahan RN  Anticoagulation Clinic  11/10/2021    _______________________________________________________________________     Anticoagulation Episode Summary     Current INR goal:  2.0-3.0   TTR:  100.0 % (2.4 wk)   Target end date:  Indefinite   Send INR reminders to:  [facility]    Indications    Thrombophilia (H) [D68.59]           Comments:           Anticoagulation Care Providers     Provider Role Specialty Phone number    Dl Allen MD Referring Family Medicine [phone]

## 2021-11-11 ENCOUNTER — OFFICE VISIT (OUTPATIENT)
Dept: HEMATOLOGY | Facility: CLINIC | Age: 66
End: 2021-11-11
Attending: FAMILY MEDICINE
Payer: COMMERCIAL

## 2021-11-11 VITALS
TEMPERATURE: 97.4 F | BODY MASS INDEX: 23.92 KG/M2 | HEART RATE: 59 BPM | RESPIRATION RATE: 14 BRPM | OXYGEN SATURATION: 99 % | SYSTOLIC BLOOD PRESSURE: 149 MMHG | HEIGHT: 72 IN | DIASTOLIC BLOOD PRESSURE: 82 MMHG | WEIGHT: 176.6 LBS

## 2021-11-11 DIAGNOSIS — D68.59 THROMBOPHILIA (H): ICD-10-CM

## 2021-11-11 PROCEDURE — 99204 OFFICE O/P NEW MOD 45 MIN: CPT | Performed by: INTERNAL MEDICINE

## 2021-11-11 ASSESSMENT — MIFFLIN-ST. JEOR: SCORE: 1624.05

## 2021-11-11 ASSESSMENT — PAIN SCALES - GENERAL: PAINLEVEL: NO PAIN (0)

## 2021-11-11 NOTE — PROGRESS NOTES
[facility]  [facility]  [address], [address], [place] [address]  Main: [phone], Fax: [phone]          Outpatient Clinic Visit  Date:  11/11/2021      Matt Rodriguez is a 67 yo male here to establish care. He is on long-term anticoagulation, and recently moved to our area.    He has a history of unprovoked pulmonary embolism in November 2018. He was anticoagulated with rivaroxaban. However at some point, he changed to warfarin. He describes this as being done due to the fact that he had increased nuisance bleeding on rivaroxaban. This seems to be primarily in the form of bright red blood per rectum related to hemorrhoids. He also expressed concern about the lack of an antidote for rivaroxaban (although that is no longer the case). Nevertheless, he has done well on warfarin without any problematic nuisance bleeding and without any more serious bleeding issues either. He has been using a home INR monitor which is working well.    There is a family history of venous thrombosis. His mom had a DVT in the postpartum period. He has a brother who had what sounds like an unprovoked DVT. He has another brother with a clot in his leg, circumstances unclear.    Previous thrombophilia testing showed that he does not have factor V Leiden or the prothrombin gene mutation. He also does not have protein C, protein S, or antithrombin deficiency. There appears to have been some question of whether or not he may have antiphospholipid antibody syndrome. He has had a positive lupus anticoagulant intermittently in the past, but the positive values appear to be due to the fact that he was tested while on rivaroxaban. It appears that he was off rivaroxaban in June 2019 and at that time his lupus inhibitor testing was negative. Soon thereafter, he resumed anticoagulation with warfarin possibly because of a persistently elevated D-dimer. Cardiolipin antibody titers have  been negative.    He is also scheduled for colonoscopy later this month. He is in need of recommendations regarding anticoagulation management around that procedure.    He has no other significant past medical history and his only medication is warfarin.    Physical exam: He appears to be in good overall health. More detailed exam not performed today.    Labs: INR earlier this week was in the therapeutic range at 2.6.      ASSESSMENT / PLAN:  1. Unprovoked pulmonary embolism, November 2018  2. Long-term anticoagulation, currently on warfarin  3. Family history of venous thrombosis, with no identifiable genetic thrombophilia    Matt is a 66-year-old male with a history of unprovoked pulmonary embolism in November 2018. From that perspective, long-term anticoagulation is clearly indicated. He is otherwise in good health and has had no significant bleeding issues.    Documentation in his outside medical records indicates he was given a diagnosis of antiphospholipid antibody syndrome. This appears to be incorrect. He had a positive lupus inhibitor when testing was performed on rivaroxaban. Off that medication, testing for lupus inhibitor was negative. He also had negative cardiolipin antibody titers.    Based on family history, there may be a genetic thrombophilia here. However he has had a complete work-up in that regard and no identifiable genetic thrombophilia was found. I explained to him that this does not have any impact on our recommendations as he already has a clear indication for long-term anticoagulation.    He would prefer to remain on warfarin which is reasonable. He is doing well without any significant bleeding issues and is using a home INR monitor which is also working well for him.    For his upcoming colonoscopy, he can simply stop warfarin prior to the procedure and resume it thereafter. Specific timing of this will be deferred to the GI physician performing the procedure but from our perspective he  can stop his warfarin 3 to 5 days prior and resume it the following day. He does not need bridging anticoagulation.    We will plan to see him back annually to revisit his long-term anticoagulation plan, sooner with new issues or concerns.      Total time 45 minutes, including review of medical records and labs, clinic visit, and documentation.      Conner Raygoza MD  Professor of Medicine  Division of Hematology, Oncology, and Transplantation  Director, [facility]

## 2021-11-22 ENCOUNTER — TELEPHONE (OUTPATIENT)
Dept: ANTICOAGULATION | Facility: CLINIC | Age: 66
End: 2021-11-22
Payer: COMMERCIAL

## 2021-11-27 ENCOUNTER — LAB (OUTPATIENT)
Dept: FAMILY MEDICINE | Facility: CLINIC | Age: 66
End: 2021-11-27
Attending: SURGERY
Payer: COMMERCIAL

## 2021-11-27 DIAGNOSIS — Z11.59 ENCOUNTER FOR SCREENING FOR OTHER VIRAL DISEASES: ICD-10-CM

## 2021-11-27 PROCEDURE — U0005 INFEC AGEN DETEC AMPLI PROBE: HCPCS

## 2021-11-27 PROCEDURE — U0003 INFECTIOUS AGENT DETECTION BY NUCLEIC ACID (DNA OR RNA); SEVERE ACUTE RESPIRATORY SYNDROME CORONAVIRUS 2 (SARS-COV-2) (CORONAVIRUS DISEASE [COVID-19]), AMPLIFIED PROBE TECHNIQUE, MAKING USE OF HIGH THROUGHPUT TECHNOLOGIES AS DESCRIBED BY CMS-2020-01-R: HCPCS

## 2021-11-28 LAB — SARS-COV-2 RNA RESP QL NAA+PROBE: NEGATIVE

## 2021-11-29 ENCOUNTER — ANESTHESIA EVENT (OUTPATIENT)
Dept: SURGERY | Facility: AMBULATORY SURGERY CENTER | Age: 66
End: 2021-11-29
Payer: COMMERCIAL

## 2021-11-30 ENCOUNTER — HOSPITAL ENCOUNTER (OUTPATIENT)
Facility: AMBULATORY SURGERY CENTER | Age: 66
End: 2021-11-30
Attending: SURGERY
Payer: COMMERCIAL

## 2021-11-30 ENCOUNTER — ANESTHESIA (OUTPATIENT)
Dept: SURGERY | Facility: AMBULATORY SURGERY CENTER | Age: 66
End: 2021-11-30
Payer: COMMERCIAL

## 2021-11-30 VITALS
TEMPERATURE: 98.5 F | SYSTOLIC BLOOD PRESSURE: 111 MMHG | DIASTOLIC BLOOD PRESSURE: 65 MMHG | RESPIRATION RATE: 16 BRPM | HEART RATE: 67 BPM | OXYGEN SATURATION: 98 %

## 2021-11-30 PROCEDURE — 45378 DIAGNOSTIC COLONOSCOPY: CPT | Performed by: SURGERY

## 2021-11-30 RX ORDER — FENTANYL CITRATE 50 UG/ML
25 INJECTION, SOLUTION INTRAMUSCULAR; INTRAVENOUS
Status: DISCONTINUED | OUTPATIENT
Start: 2021-11-30 | End: 2021-12-01 | Stop reason: HOSPADM

## 2021-11-30 RX ORDER — LIDOCAINE HYDROCHLORIDE 20 MG/ML
INJECTION, SOLUTION INFILTRATION; PERINEURAL PRN
Status: DISCONTINUED | OUTPATIENT
Start: 2021-11-30 | End: 2021-11-30

## 2021-11-30 RX ORDER — SODIUM CHLORIDE, SODIUM LACTATE, POTASSIUM CHLORIDE, CALCIUM CHLORIDE 600; 310; 30; 20 MG/100ML; MG/100ML; MG/100ML; MG/100ML
INJECTION, SOLUTION INTRAVENOUS CONTINUOUS
Status: DISCONTINUED | OUTPATIENT
Start: 2021-11-30 | End: 2021-12-01 | Stop reason: HOSPADM

## 2021-11-30 RX ORDER — ONDANSETRON 4 MG/1
4 TABLET, ORALLY DISINTEGRATING ORAL EVERY 30 MIN PRN
Status: DISCONTINUED | OUTPATIENT
Start: 2021-11-30 | End: 2021-12-01 | Stop reason: HOSPADM

## 2021-11-30 RX ORDER — ONDANSETRON 2 MG/ML
4 INJECTION INTRAMUSCULAR; INTRAVENOUS
Status: DISCONTINUED | OUTPATIENT
Start: 2021-11-30 | End: 2021-12-01 | Stop reason: HOSPADM

## 2021-11-30 RX ORDER — LIDOCAINE 40 MG/G
CREAM TOPICAL
Status: DISCONTINUED | OUTPATIENT
Start: 2021-11-30 | End: 2021-12-01 | Stop reason: HOSPADM

## 2021-11-30 RX ORDER — ONDANSETRON 2 MG/ML
4 INJECTION INTRAMUSCULAR; INTRAVENOUS EVERY 30 MIN PRN
Status: DISCONTINUED | OUTPATIENT
Start: 2021-11-30 | End: 2021-12-01 | Stop reason: HOSPADM

## 2021-11-30 RX ORDER — ONDANSETRON 2 MG/ML
INJECTION INTRAMUSCULAR; INTRAVENOUS PRN
Status: DISCONTINUED | OUTPATIENT
Start: 2021-11-30 | End: 2021-11-30

## 2021-11-30 RX ORDER — GLYCOPYRROLATE 0.2 MG/ML
INJECTION, SOLUTION INTRAMUSCULAR; INTRAVENOUS PRN
Status: DISCONTINUED | OUTPATIENT
Start: 2021-11-30 | End: 2021-11-30

## 2021-11-30 RX ORDER — FENTANYL CITRATE 50 UG/ML
25 INJECTION, SOLUTION INTRAMUSCULAR; INTRAVENOUS EVERY 5 MIN PRN
Status: DISCONTINUED | OUTPATIENT
Start: 2021-11-30 | End: 2021-12-01 | Stop reason: HOSPADM

## 2021-11-30 RX ORDER — PROPOFOL 10 MG/ML
INJECTION, EMULSION INTRAVENOUS PRN
Status: DISCONTINUED | OUTPATIENT
Start: 2021-11-30 | End: 2021-11-30

## 2021-11-30 RX ORDER — PROPOFOL 10 MG/ML
INJECTION, EMULSION INTRAVENOUS CONTINUOUS PRN
Status: DISCONTINUED | OUTPATIENT
Start: 2021-11-30 | End: 2021-11-30

## 2021-11-30 RX ADMIN — PROPOFOL 50 MG: 10 INJECTION, EMULSION INTRAVENOUS at 10:37

## 2021-11-30 RX ADMIN — ONDANSETRON 4 MG: 2 INJECTION INTRAMUSCULAR; INTRAVENOUS at 10:39

## 2021-11-30 RX ADMIN — SODIUM CHLORIDE, SODIUM LACTATE, POTASSIUM CHLORIDE, CALCIUM CHLORIDE: 600; 310; 30; 20 INJECTION, SOLUTION INTRAVENOUS at 08:40

## 2021-11-30 RX ADMIN — PROPOFOL 180 MCG/KG/MIN: 10 INJECTION, EMULSION INTRAVENOUS at 10:37

## 2021-11-30 RX ADMIN — SODIUM CHLORIDE, SODIUM LACTATE, POTASSIUM CHLORIDE, CALCIUM CHLORIDE: 600; 310; 30; 20 INJECTION, SOLUTION INTRAVENOUS at 10:35

## 2021-11-30 RX ADMIN — LIDOCAINE HYDROCHLORIDE 60 MG: 20 INJECTION, SOLUTION INFILTRATION; PERINEURAL at 10:37

## 2021-11-30 RX ADMIN — GLYCOPYRROLATE 0.2 MG: 0.2 INJECTION, SOLUTION INTRAMUSCULAR; INTRAVENOUS at 10:37

## 2021-11-30 NOTE — H&P
.  PRE COLONOSCOPY EVALUATION   H&P       ASSESSMENT     Matt Rodriguez is a 66 year old male who is in today for a Screening Colonoscopy.  The patient has not had a stool screening test. He is on coumadin for history of PE         PLAN      Screening Colonoscopy         HPI     Matt Rodriguez is a 66 year old male who presents for a colonoscopy.  They do not have a family history of colon cancer  They do not have a history of polyps  They have not been loosing weight  They have not noted any change in bowel habits   They have had blood in their stool occasionally but he is generally anticoagulated..         PAST MEDICAL HISTORY SURGICAL HISTORY     Past Medical History:   Diagnosis Date     Hypertension      Pulmonary embolism (H)     Past Surgical History:   Procedure Laterality Date     COLONOSCOPY  2018     TONSILLECTOMY            CURRENT MEDICATIONS ALLERGIES     Current Outpatient Rx   Medication Sig Dispense Refill     warfarin ANTICOAGULANT (COUMADIN) 5 MG tablet Take 1 tablet (5 mg) by mouth daily 90 tablet 3    No Known Allergies       FAMILY HISTORY     Family History   Problem Relation Age of Onset     Clotting Disorder Mother      Other - See Comments Mother         low blood pressure     Clotting Disorder Father      Hypertension Father      Hyperlipidemia Father      Coronary Artery Disease Father      Pancreatitis Sister      Other Cancer Sister      Cerebrovascular Disease Sister      Kidney Cancer Brother      Cerebrovascular Disease Brother      Other Cancer Brother      Clotting Disorder Brother      Hypertension Brother      Clotting Disorder Brother      Hypertension Brother      Atrial fibrillation Brother      Atrial fibrillation Brother      No Known Problems Daughter      No Known Problems Daughter      Genetic Disorder Other      Anxiety Disorder No family hx of          SOCIAL HISTORY     Social History     Socioeconomic History     Marital status:      Spouse name: None     Number  of children: None     Years of education: None     Highest education level: None   Occupational History     None   Tobacco Use     Smoking status: Never Smoker     Smokeless tobacco: Never Used   Vaping Use     Vaping Use: Never used   Substance and Sexual Activity     Alcohol use: Yes     Comment: 2 drinks per week     Drug use: Never     Sexual activity: Yes     Partners: Female   Other Topics Concern     Parent/sibling w/ CABG, MI or angioplasty before 65F 55M? No   Social History Narrative     None     Social Determinants of Health     Financial Resource Strain: Not on file   Food Insecurity: Not on file   Transportation Needs: Not on file   Physical Activity: Not on file   Stress: Not on file   Social Connections: Not on file   Intimate Partner Violence: Not on file   Housing Stability: Not on file         REVIEW OF SYSTEMS       10 point review of systems are unremarkable except for the symptoms described in the HPI    PHYSICAL EXAM     VITAL SIGNS: /79 (Cuff Size: Adult Regular)   Pulse 75   Temp 97.7  F (36.5  C) (Temporal)   Resp 16   SpO2 97%    General : Alert, cooperative, appears stated age   Head: Normocephalic, without obvious abnormality,   HEENT:  conjunctiva/corneas clear, EOM's intact, no scleral icterus   Lungs: Clear to auscultation bilaterally, respirations unlabored  Heart: Regular rate and rhythm, S1, S2 normal, no murmur, rub or gallop   Abdomen: Soft, non-tender, no guarding, + bowel sounds active,   Extremities : No obvious swelling,  Neurologic:  moves all extremities to command, no focal findings    Airway: Class 2  ASA:   2      [facility]  [phone]

## 2021-11-30 NOTE — ANESTHESIA POSTPROCEDURE EVALUATION
Patient: Matt Rodriguez    Procedure: Procedure(s):  COLONOSCOPY       Diagnosis:Colon cancer screening [Z12.11]  Diagnosis Additional Information: No value filed.    Anesthesia Type:  MAC    Note:  Disposition: Outpatient   Postop Pain Control: Uneventful            Sign Out: Well controlled pain   PONV: No   Neuro/Psych: Uneventful            Sign Out: Acceptable/Baseline neuro status   Airway/Respiratory: Uneventful            Sign Out: Acceptable/Baseline resp. status   CV/Hemodynamics: Uneventful            Sign Out: Acceptable CV status; No obvious hypovolemia; No obvious fluid overload   Other NRE: NONE   DID A NON-ROUTINE EVENT OCCUR? No           Last vitals:  Vitals Value Taken Time   /79 11/30/21 1145   Temp 98.5  F (36.9  C) 11/30/21 1119   Pulse 62 11/30/21 1215   Resp 16 11/30/21 1145   SpO2 96 % 11/30/21 1149   Vitals shown include unvalidated device data.    Electronically Signed By: TIM WORTHINGTON MD  November 30, 2021  12:17 PM

## 2021-11-30 NOTE — PROGRESS NOTES
Patient had an unwitnessed fall at approximately 1155 am. Prior to fall patient was standing at bedside with Nurse and was steady on his feet. Pt fell approximately 3 minutes after nurse left room. Pt adamantly denies hitting his head and is alert and oriented x 4 at this time. Pt has a cut on the left ring finger. Reports he was trying to put his pants on when he fell.     Patient examined by Dr. Velazquez and Dr. Gonsalez.  Pt takes coumadin. Per Dr. Jose patietn to restart coumadin this evening. Pt informed on signs and symptoms to watch for that may be emergent and when to present to the ER.    Cassandra Ponce RN on 11/30/2021 at 12:12 PM

## 2021-11-30 NOTE — DISCHARGE INSTRUCTIONS
Please seek emergent care if you experience nausea and vomiting, changes in vision, new onset headaches, excessive fatigue, or confusion.    Start taking coumadin tonight.    If you have any questions or concerns regarding your procedure, please contact Dr. Velazquez, his office number is [phone].    Discharge Instructions: After Your Surgery    You ve just had surgery. During surgery, you were given medicine called anesthesia to keep you relaxed and free of pain. After surgery, you may have some pain or nausea. This is common. Here are some tips for feeling better and getting well after surgery.    Going home    Your healthcare provider will show you how to take care of yourself when you go home. He or she will also answer your questions. Have an adult family member or friend drive you home. For the first 24 hours after your surgery:    Don't drive or use heavy equipment.    Don't make important decisions or sign legal papers.    Don't drink alcohol.    Have someone stay with you, if needed. He or she can watch for problems and help keep you safe.  Be sure to go to all follow-up visits with your healthcare provider. And rest after your surgery for as long as your healthcare provider tells you to.    Coping with pain    If you have pain after surgery, pain medicine will help you feel better. Take it as told, before pain becomes severe. Also, ask your healthcare provider or pharmacist about other ways to control pain. This might be with heat, ice, or relaxation. And follow any other instructions your surgeon or nurse gives you.    Tips for taking pain medicine    To get the best relief possible, remember these points:    Pain medicines can upset your stomach. Taking them with a little food may help.    Most pain relievers taken by mouth need at least 20 to 30 minutes to start to work.    Don't wait till your pain becomes severe before you take your medicine. Try to time your medicine so that you can take it  before starting an activity. This might be before you get dressed, go for a walk, or sit down for dinner.    Constipation is a common side effect of pain medicines. It's ok to take medicines such as laxatives or stool softeners to help ease constipation. Also ask if you should skip any foods. Drinking lots of fluids and eating foods such as fruits and vegetables that are high in fiber can also help.    Drinking alcohol and taking pain medicine can cause dizziness and slow your breathing. It can even be deadly. Don't drink alcohol while taking pain medicine.    Pain medicine can make you react more slowly to things. Don't drive or run machinery while taking pain medicine.  Your healthcare provider may tell you to take acetaminophen to help ease your pain. Ask him or her how much you are supposed to take each day. Acetaminophen or other pain relievers may interact with your prescription medicines or other over-the-counter (OTC) medicines. Some prescription medicines have acetaminophen and other ingredients. Using both prescription and OTC acetaminophen for pain can cause you to overdose. Read the labels on your OTC medicines with care. This will help you to clearly know the list of ingredients, how much to take, and any warnings. It may also help you not take too much acetaminophen. If you have questions or don't understand the information, ask your pharmacist or healthcare provider to explain it to you before you take the OTC medicine.    Managing nausea    Some people have an upset stomach after surgery. This is often because of anesthesia, pain, or pain medicine, or the stress of surgery. These tips will help you handle nausea and eat healthy foods as you get better. If you were on a special food plan before surgery, ask your healthcare provider if you should follow it while you get better. These tips may help:      Don't push yourself to eat. Your body will tell you when to eat and how much.    Start off with  clear liquids and soup. They are easier to digest.    Next try semi-solid foods, such as mashed potatoes, applesauce, and gelatin, as you feel ready.    Slowly move to solid foods. Don t eat fatty, rich, or spicy foods at first.    Don't force yourself to have 3 large meals a day. Instead eat smaller amounts more often.    Take pain medicines with a small amount of solid food, such as crackers or toast, to prevent nausea.    When to call your healthcare provider    Call your healthcare provider if:    You still have intolerable pain an hour after taking medicine. The medicine may not be strong enough.    You feel too sleepy, dizzy, or groggy. The medicine may be too strong.    You have side effects such as nausea or vomiting, or skin changes such as rash, itching, or hives. Your healthcare provider may suggest other medicines to control side effects.  Rash, itching, or hives may mean you have an allergic reaction. Report this right away. If you have trouble breathing or facial swelling, call 911 right away.    If you have obstructive sleep apnea    You were given anesthesia medicine during surgery to keep you comfortable and free of pain. After surgery, you may have more apnea spells because of this medicine and other medicines you were given. The spells may last longer than usual.   At home:    Keep using the continuous positive airway pressure (CPAP) device when you sleep. Unless your healthcare provider tells you not to, use it when you sleep, day or night. CPAP is a common device used to treat obstructive sleep apnea.    Talk with your provider before taking any pain medicine, muscle relaxants, or sedatives. Your provider will tell you about the possible dangers of taking these medicines.

## 2021-11-30 NOTE — ANESTHESIA PREPROCEDURE EVALUATION
Anesthesia Pre-Procedure Evaluation    Patient: Matt Rodriguez   MRN: [id number] : 1955        Preoperative Diagnosis: Colon cancer screening [Z12.11]    Procedure : Procedure(s):  COLONOSCOPY          Past Medical History:   Diagnosis Date     Hypertension      Pulmonary embolism (H)       Past Surgical History:   Procedure Laterality Date     COLONOSCOPY  2018     TONSILLECTOMY        No Known Allergies   Social History     Tobacco Use     Smoking status: Never Smoker     Smokeless tobacco: Never Used   Substance Use Topics     Alcohol use: Yes     Comment: 2 drinks per week      Wt Readings from Last 1 Encounters:   21 80.1 kg (176 lb 9.6 oz)        Anesthesia Evaluation   Pt has had prior anesthetic.     No history of anesthetic complications       ROS/MED HX  ENT/Pulmonary:  - neg pulmonary ROS     Neurologic:  - neg neurologic ROS     Cardiovascular:     (+) -----Taking blood thinners     METS/Exercise Tolerance:     Hematologic: Comments: Thrombophilia       Musculoskeletal:  - neg musculoskeletal ROS     GI/Hepatic:  - neg GI/hepatic ROS     Renal/Genitourinary:  - neg Renal ROS     Endo:  - neg endo ROS     Psychiatric/Substance Use:  - neg psychiatric ROS     Infectious Disease:  - neg infectious disease ROS     Malignancy:  - neg malignancy ROS     Other:  - neg other ROS          Physical Exam    Airway  airway exam normal      Mallampati: I   TM distance: > 3 FB   Neck ROM: full   Mouth opening: > 3 cm    Respiratory Devices and Support         Dental  no notable dental history         Cardiovascular   cardiovascular exam normal       Rhythm and rate: regular and normal     Pulmonary   pulmonary exam normal        breath sounds clear to auscultation           OUTSIDE LABS:  CBC: No results found for: WBC, HGB, HCT, PLT  BMP:   Lab Results   Component Value Date    GLC 83 10/12/2021     COAGS:   Lab Results   Component Value Date    INR 2.6 (A) 2021     POC: No results found for: BGM,  HCG, HCGS  HEPATIC: No results found for: ALBUMIN, PROTTOTAL, ALT, AST, GGT, ALKPHOS, BILITOTAL, BILIDIRECT, SHANICE  OTHER: No results found for: PH, LACT, A1C, AAMIR, PHOS, MAG, LIPASE, AMYLASE, TSH, T4, T3, CRP, SED    Anesthesia Plan    ASA Status:  2   NPO Status:  NPO Appropriate    Anesthesia Type: MAC.     - Reason for MAC: straight local not clinically adequate              Consents    Anesthesia Plan(s) and associated risks, benefits, and realistic alternatives discussed. Questions answered and patient/representative(s) expressed understanding.    - Discussed:     - Discussed with:  Patient         Postoperative Care    Pain management: IV analgesics, Oral pain medications, Multi-modal analgesia.   PONV prophylaxis: Ondansetron (or other 5HT-3), Dexamethasone or Solumedrol, Droperidol or Haldol, Background Propofol Infusion     Comments:                TIM WORTHINGTON MD

## 2021-11-30 NOTE — OP NOTE
COLONOSCOPY REPORT      Pre-op Dx:              Screening colonoscopy      Procedure:             Colonoscopy      Indications:            Colon Cancer Screening      Findings:                Normal colonoscopy    Procedure:              The patient is brought to the endoscopy suite placed in a lateral position and the patient is given conscious sedation anesthesia.  The colonoscope was advanced atraumatically into the anus taken all way up and around to the cecum.  The ileocecal valve and the appendiceal orifice are clearly identified.  The scope was slowly drawn back no lesions seen in the cecum or the ascending colon no lesions seen in the transverse colon no lesions in the descending or sigmoid colon.  The scope was drawn back into the rectum and retroflexed I do not see evidence for any significant hemorrhoidal disease.  The colonoscope was straightened and taken up to the splenic flexure areas aspirated from the left side of the colon as the scope was withdrawn.          Withdrawal Time:  7:00    EBL:                        None      Medications:         MAC; see anesthesia note for details          Complications:      None      Post-op Dx:            Normal Colonoscopy      Recommendation:   Next Colonoscopy in 10 years, the year 2031      Rajeev Velazquez MD  11/30/2021 11:18 AM  [facility]  [phone]

## 2021-11-30 NOTE — ANESTHESIA CARE TRANSFER NOTE
Patient: Matt Rodriguez    Procedure: Procedure(s):  COLONOSCOPY       Diagnosis: Colon cancer screening [Z12.11]  Diagnosis Additional Information: No value filed.    Anesthesia Type:   MAC     Note:      Level of Consciousness: drowsy  Oxygen Supplementation: room air    Independent Airway: airway patency satisfactory and stable  Dentition: dentition unchanged  Vital Signs Stable: post-procedure vital signs reviewed and stable  Report to RN Given: handoff report given  Patient transferred to: Phase II    Handoff Report: Identifed the Patient, Identified the Reponsible Provider, Reviewed the pertinent medical history, Discussed the surgical course, Reviewed Intra-OP anesthesia mangement and issues during anesthesia, Set expectations for post-procedure period and Allowed opportunity for questions and acknowledgement of understanding      Vitals:  Vitals Value Taken Time   /60 11/30/21 1119   Temp 98.5  F (36.9  C) 11/30/21 1119   Pulse 59 11/30/21 1119   Resp 16 11/30/21 1119   SpO2 97 % 11/30/21 1119       Electronically Signed By: RAPHAEL Borjas CRNA  November 30, 2021  11:22 AM

## 2021-12-02 ENCOUNTER — TELEPHONE (OUTPATIENT)
Dept: ANTICOAGULATION | Facility: CLINIC | Age: 66
End: 2021-12-02
Payer: COMMERCIAL

## 2021-12-02 DIAGNOSIS — D68.59 THROMBOPHILIA (H): Primary | ICD-10-CM

## 2021-12-02 NOTE — TELEPHONE ENCOUNTER
Patient returned call and states he checked his INR on Monday 11/29/21.  Patient states he checked his INR on 11/29/21 and it was 1.1.      Patient states he had been hold for 6 1/2 days for a colonoscopy on 11/30/21 and resumed warfarin the night of the procedure.  Patient seems to be confused about where to call his INR results into.  Patient states he received his actual monitor from [facility] but he his billed by [employer].   Patient states he has called [employer] but we have not received any faxed results yet.    Advised patient he needs to call his results into [employer] and they will fax results to [facility].      [facility]    SUKUMAR LindseyN, RN  Anticoagulation Clinic

## 2021-12-02 NOTE — TELEPHONE ENCOUNTER
ANTICOAGULATION     Matt Rodriguez is overdue for INR check.      Left message  reminding patient to check INR with their home meter and call results to the home monitoring company as soon as possible.     Sincere Parson RN

## 2021-12-03 DIAGNOSIS — D68.59 THROMBOPHILIA (H): ICD-10-CM

## 2021-12-03 RX ORDER — WARFARIN SODIUM 5 MG/1
5 TABLET ORAL DAILY
Qty: 90 TABLET | Refills: 3 | OUTPATIENT
Start: 2021-12-03

## 2021-12-03 NOTE — TELEPHONE ENCOUNTER
Refill sent 1 week ago.  Please call pharmacy and see if there is an error in this process or an issue.

## 2021-12-03 NOTE — TELEPHONE ENCOUNTER
Reason for Call: patient is calling to request a refill of    warfarin ANTICOAGULANT (COUMADIN) 5 MG tablet    SEND TO SERVE YOU RX MAIL ORDER    Detailed comments: LAST SEEN 10/12/21    Phone Number Patient can be reached at: Home number on file [phone] (home)    Best Time:ANY    Can we leave a detailed message on this number? YES    Call taken on 12/3/2021 at 9:37 AM by Melissa Lawler

## 2021-12-13 ENCOUNTER — TELEPHONE (OUTPATIENT)
Dept: ANTICOAGULATION | Facility: CLINIC | Age: 66
End: 2021-12-13

## 2021-12-13 ENCOUNTER — ANTICOAGULATION THERAPY VISIT (OUTPATIENT)
Dept: ANTICOAGULATION | Facility: CLINIC | Age: 66
End: 2021-12-13
Payer: COMMERCIAL

## 2021-12-13 DIAGNOSIS — Z91.199 FAILURE TO ATTEND APPOINTMENT: ICD-10-CM

## 2021-12-13 DIAGNOSIS — D68.59 THROMBOPHILIA (H): Primary | ICD-10-CM

## 2021-12-13 NOTE — TELEPHONE ENCOUNTER
Talked with Matt who says he did an inr with his [employer] home monitor,  result was 2.1.  We did not receive a fax from them and Matt will look into that. I asked that he schedule a lab inr until this is sorted out and he declines at present

## 2021-12-29 ENCOUNTER — TELEPHONE (OUTPATIENT)
Dept: ANTICOAGULATION | Facility: CLINIC | Age: 66
End: 2021-12-29
Payer: COMMERCIAL

## 2021-12-29 DIAGNOSIS — D68.59 THROMBOPHILIA (H): Primary | ICD-10-CM

## 2022-01-06 ENCOUNTER — TRANSFERRED RECORDS (OUTPATIENT)
Dept: HEALTH INFORMATION MANAGEMENT | Facility: CLINIC | Age: 67
End: 2022-01-06
Payer: COMMERCIAL

## 2022-01-17 ENCOUNTER — TRANSFERRED RECORDS (OUTPATIENT)
Dept: HEALTH INFORMATION MANAGEMENT | Facility: CLINIC | Age: 67
End: 2022-01-17
Payer: COMMERCIAL

## 2022-01-19 ENCOUNTER — TELEPHONE (OUTPATIENT)
Dept: ANTICOAGULATION | Facility: CLINIC | Age: 67
End: 2022-01-19
Payer: COMMERCIAL

## 2022-01-19 NOTE — TELEPHONE ENCOUNTER
ANTICOAGULATION     Matt Rodriguez is overdue for INR check.      Spoke with Matt instructed to test INR with home meter and call results to home monitoring company as soon as possible.    - INR was 2.5 on 1/17/22.   - [facility] and [facility] are suppose to report to [employer].   - will await for fax.    Tami Corrales RN

## 2022-01-24 ENCOUNTER — TRANSFERRED RECORDS (OUTPATIENT)
Dept: HEALTH INFORMATION MANAGEMENT | Facility: CLINIC | Age: 67
End: 2022-01-24
Payer: COMMERCIAL

## 2022-01-25 NOTE — TELEPHONE ENCOUNTER
Called [employer] Rep - Juan Boo [phone]      - pt relates he has [employer] home monitor.   - however, he not calling [employer] with INR test result.  He states he calls [facility] and they in turn calls [employer] with result.     - please verify this.  And if we could have the telephone number, patient should be calling in his INR result.

## 2022-01-27 NOTE — TELEPHONE ENCOUNTER
Called [employer] Rep - Juan Boo [phone]      - have not heard back yet from [employer] rep.     - called and LM.  Still awaiting call back.

## 2022-01-28 ENCOUNTER — TELEPHONE (OUTPATIENT)
Dept: ANTICOAGULATION | Facility: CLINIC | Age: 67
End: 2022-01-28
Payer: COMMERCIAL

## 2022-01-28 ENCOUNTER — ANTICOAGULATION THERAPY VISIT (OUTPATIENT)
Dept: FAMILY MEDICINE | Facility: CLINIC | Age: 67
End: 2022-01-28
Payer: COMMERCIAL

## 2022-01-28 DIAGNOSIS — D68.59 THROMBOPHILIA (H): Primary | ICD-10-CM

## 2022-01-28 LAB
INR (EXTERNAL): 2.4 (ref 0.9–1.1)
INR (EXTERNAL): 2.5 (ref 0.9–1.1)

## 2022-01-28 NOTE — TELEPHONE ENCOUNTER
ANTICOAGULATION     Matt Rodriguez is overdue for INR check.      Spoke with Matt,    - [facility] monitor was ordered thru [employer].  Where he reports his INR and in turn they are suppose to fax to PCP.     - Matt reported [facility] was working on paper work to transfer care.   - reported testing INR with home monitor on 1/24/22 - INR result was 2.4     - [facility]# [phone]  (Araceli)    - spoke with Araceli and she reported she will work on her end to transfer call to [facility].  She will send papers to current PCP (Dr. Carnes) to fill out for transfer of care.     - Araceli also reported INR results:    1/17/22 - 2.5    1/24/22 - 2.4   - she will FAX these INR results to  [employer]  [phone]    Tami Corrales RN

## 2022-01-28 NOTE — PROGRESS NOTES
ANTICOAGULATION MANAGEMENT     Matt Rodriguez 66 year old male is on warfarin with therapeutic INR result. (Goal INR 2.0-3.0)    Recent labs: (last 7 days)     01/24/22  0000   INR 2.4*       ASSESSMENT     Source(s): Chart Review and Patient/Caregiver Call       Warfarin doses taken: Warfarin taken as instructed    Diet: No new diet changes identified    New illness, injury, or hospitalization: No    Medication/supplement changes: None noted    Signs or symptoms of bleeding or clotting: No    Previous INR: Therapeutic last 2 visits    Additional findings: None     PLAN     Recommended plan for no diet, medication or health factor changes affecting INR     Dosing Instructions:    Continue your current warfarin dose with next INR in 1 week       Summary  As of 1/28/2022    Full warfarin instructions:  5 mg every day   Next INR check:  1/31/2022             Telephone call with  Matt  ([phone]) who verbalizes understanding and agrees to plan    Patient to recheck with home meter thru [facility] once a week.    Education provided: Importance of consistent vitamin K intake, Impact of vitamin K foods on INR, Potential interaction between warfarin and alcohol, Goal range and significance of current result, Importance of therapeutic range, Importance of following up at instructed interval, Importance of taking warfarin as instructed, Monitoring for bleeding signs and symptoms, Monitoring for clotting signs and symptoms, When to seek medical attention/emergency care, Information on home INR monitoring, Importance of notifying clinic for changes in medications; a sooner lab recheck maybe needed. and Importance of notifying clinic for diarrhea, nausea/vomiting, reduced intake, and/or illness; a sooner lab recheck maybe needed.    Plan made per [facility] anticoagulation protocol    Tami Corrales RN  Anticoagulation Clinic  1/28/2022    _______________________________________________________________________     Anticoagulation  Episode Summary     Current INR goal:  2.0-3.0   TTR:  100.0 % (3.1 mo)   Target end date:  Indefinite   Send INR reminders to:  [facility]    Indications    Thrombophilia (H) [D68.59]           Comments:  [facility] home meter         Anticoagulation Care Providers     Provider Role Specialty Phone number    Dl Allen MD Referring Family Medicine [phone]

## 2022-01-28 NOTE — PROGRESS NOTES
Received fax for home meter results for 1/24 of 2.4 and 1/17 of 2.5.    Nancy Griffin [facility] nurse, was working with patient on getting home meter transferred over.    Patient has been called with dosing, will close encounter.  Jany Mast RN

## 2022-02-09 ENCOUNTER — DOCUMENTATION ONLY (OUTPATIENT)
Dept: FAMILY MEDICINE | Facility: CLINIC | Age: 67
End: 2022-02-09
Payer: COMMERCIAL

## 2022-02-09 ENCOUNTER — MYC MEDICAL ADVICE (OUTPATIENT)
Dept: NURSING | Facility: CLINIC | Age: 67
End: 2022-02-09
Payer: COMMERCIAL

## 2022-02-09 DIAGNOSIS — D68.59 THROMBOPHILIA (H): Primary | ICD-10-CM

## 2022-02-14 LAB
INR (EXTERNAL): 2.1 (ref 0.9–1.1)
INR (EXTERNAL): 2.2 (ref 0.9–1.1)

## 2022-02-14 NOTE — TELEPHONE ENCOUNTER
ANTICOAGULATION MANAGEMENT     Matt Rodriguez 66 year old male is on warfarin with therapeutic INR result. (Goal INR )    Recent labs: (last 7 days)     02/14/22  0954   INR 2.2*       ASSESSMENT     Source(s): Chart Review and Patient/Caregiver Call       Patient reports swelling in feet     PLAN     Recommended plan for no diet, medication or health factor changes affecting INR     Dosing Instructions: Continue your current warfarin dose with next INR in 1 week       Summary  As of 2/9/2022    Full warfarin instructions:  5 mg every day   Next INR check:  2/21/2022             Sent [employer] message with dosing and follow up instructions    Patient to recheck with home meter    Education provided: Please call back if any changes to your diet, medications or how you've been taking warfarin    Plan made per [facility] anticoagulation protocol    Jany Mast RN  Anticoagulation Clinic  2/14/2022    _______________________________________________________________________     Anticoagulation Episode Summary     Current INR goal:  2.0-3.0   TTR:  100.0 % (3.8 mo)   Target end date:  Indefinite   Send INR reminders to:  [facility]    Indications    Thrombophilia (H) [D68.59]           Comments:  [employer] home meter         Anticoagulation Care Providers     Provider Role Specialty Phone number    Dl Allen MD Referring Family Medicine [phone]

## 2022-02-28 ENCOUNTER — DOCUMENTATION ONLY (OUTPATIENT)
Dept: ANTICOAGULATION | Facility: CLINIC | Age: 67
End: 2022-02-28
Payer: COMMERCIAL

## 2022-03-07 ENCOUNTER — DOCUMENTATION ONLY (OUTPATIENT)
Dept: ANTICOAGULATION | Facility: CLINIC | Age: 67
End: 2022-03-07
Payer: COMMERCIAL

## 2022-03-07 DIAGNOSIS — D68.59 THROMBOPHILIA (H): Primary | ICD-10-CM

## 2022-03-07 LAB — INR (EXTERNAL): 2.7 (ref 0.9–1.1)

## 2022-03-07 NOTE — PROGRESS NOTES
ANTICOAGULATION MANAGEMENT     Matt Rodriguez 66 year old male is on warfarin with therapeutic INR result. (Goal INR 2.0-3.0)    Recent labs: (last 7 days)     03/07/22  1243   INR 2.7*       ASSESSMENT       Source(s): Chart Review and Patient/Caregiver Call       Warfarin doses taken: Warfarin taken as instructed    Diet: No new diet changes identified    New illness, injury, or hospitalization: No    Medication/supplement changes: None noted    Signs or symptoms of bleeding or clotting: No    Previous INR: Therapeutic last 2(+) visits    Additional findings: None       PLAN     Recommended plan for no diet, medication or health factor changes affecting INR     Dosing Instructions: Continue your current warfarin dose with next INR in 1 week       Summary  As of 3/7/2022    Full warfarin instructions:  5 mg every day   Next INR check:  3/14/2022             Telephone call with Matt who verbalizes understanding and agrees to plan    Patient to recheck with home meter    Education provided: Please call back if any changes to your diet, medications or how you've been taking warfarin    Plan made per [facility] anticoagulation protocol    Jany Mast RN  Anticoagulation Clinic  3/7/2022    _______________________________________________________________________     Anticoagulation Episode Summary     Current INR goal:  2.0-3.0   TTR:  100.0 % (4.5 mo)   Target end date:  Indefinite   Send INR reminders to:  [facility]    Indications    Thrombophilia (H) [D68.59]           Comments:  [facility] home meter         Anticoagulation Care Providers     Provider Role Specialty Phone number    Dl Allen MD Referring Family Medicine [phone]

## 2022-03-08 ENCOUNTER — MEDICAL CORRESPONDENCE (OUTPATIENT)
Dept: HEALTH INFORMATION MANAGEMENT | Facility: CLINIC | Age: 67
End: 2022-03-08
Payer: COMMERCIAL

## 2022-03-21 ENCOUNTER — DOCUMENTATION ONLY (OUTPATIENT)
Dept: ANTICOAGULATION | Facility: CLINIC | Age: 67
End: 2022-03-21
Payer: COMMERCIAL

## 2022-03-21 NOTE — PROGRESS NOTES
ANTICOAGULATION     Matt Rodriguez is overdue for INR check.      Sent [employer] reminder    Carmita Quiles RN

## 2022-04-04 ENCOUNTER — TRANSFERRED RECORDS (OUTPATIENT)
Dept: HEALTH INFORMATION MANAGEMENT | Facility: CLINIC | Age: 67
End: 2022-04-04
Payer: COMMERCIAL

## 2022-04-04 LAB — INR (EXTERNAL): 2 (ref 0.9–1.1)

## 2022-04-05 ENCOUNTER — ANTICOAGULATION THERAPY VISIT (OUTPATIENT)
Dept: ANTICOAGULATION | Facility: CLINIC | Age: 67
End: 2022-04-05
Payer: COMMERCIAL

## 2022-04-05 ENCOUNTER — MYC MEDICAL ADVICE (OUTPATIENT)
Dept: ANTICOAGULATION | Facility: CLINIC | Age: 67
End: 2022-04-05
Payer: COMMERCIAL

## 2022-04-05 ENCOUNTER — DOCUMENTATION ONLY (OUTPATIENT)
Dept: ANTICOAGULATION | Facility: CLINIC | Age: 67
End: 2022-04-05
Payer: COMMERCIAL

## 2022-04-05 DIAGNOSIS — D68.59 THROMBOPHILIA (H): Primary | ICD-10-CM

## 2022-04-05 NOTE — PROGRESS NOTES
ANTICOAGULATION MANAGEMENT     Matt Rodriguez 66 year old male is on warfarin with therapeutic INR result. (Goal INR 2.0-3.0)    Recent labs: (last 7 days)     04/04/22  0000   INR 2.0*       ASSESSMENT       Source(s): Chart Review    Previous INR was Supratherapeutic    Medication, diet, health changes since last INR chart reviewed; none identified     Patient checked INR yesterday and called it into [employer] however the fax was not received.  Spoke with jovany Ruiz from [employer] and confirmed the new enrollment form and fax number have been received.  It is unclear why the result was not faxed to [facility] but Joseph will send this onto their .           PLAN     Recommended plan for no diet, medication or health factor changes affecting INR     Dosing Instructions: continue your current warfarin dose with next INR in 1 week       Summary  As of 4/5/2022    Full warfarin instructions:  5 mg every day   Next INR check:  4/11/2022             Detailed voice message left for Matt with dosing instructions and follow up date.      Patient to recheck with home meter    Education provided: Please call back if any changes to your diet, medications or how you've been taking warfarin    Plan made per [facility] anticoagulation protocol    Tania Liriano, RN  Anticoagulation Clinic  4/5/2022    _______________________________________________________________________     Anticoagulation Episode Summary     Current INR goal:  2.0-3.0   TTR:  92.0 % (5.5 mo)   Target end date:  Indefinite   Send INR reminders to:  [facility]    Indications    Thrombophilia (H) [D68.59]           Comments:  [employer] home meter         Anticoagulation Care Providers     Provider Role Specialty Phone number    Dl Allen MD Referring Family Medicine [phone]

## 2022-04-05 NOTE — PROGRESS NOTES
ANTICOAGULATION     Matt TAY Rodriguez is overdue for INR check.      Left message  reminding patient to check INR with their home meter and call results to the home monitoring company as soon as possible.     Ketty Myers RN

## 2022-04-05 NOTE — PROGRESS NOTES
Called [employer] and spoke with Joseph who informed writer they did receive the new enrollment form and confirmed the fax number of [phone] is correct.    Per Joseph, the patient checked his INR yesterday 4/4/22 and it was 2.0, but we did not receive the fax.  Joseph will be escalating this to their quality team since we did not receive the fax.    SUKUMAR LindseyN, RN  Anticoagulation Clinic

## 2022-04-08 ENCOUNTER — ANTICOAGULATION THERAPY VISIT (OUTPATIENT)
Dept: ANTICOAGULATION | Facility: CLINIC | Age: 67
End: 2022-04-08
Payer: COMMERCIAL

## 2022-04-08 DIAGNOSIS — D68.59 THROMBOPHILIA (H): Primary | ICD-10-CM

## 2022-04-08 NOTE — PROGRESS NOTES
Fax received from [facility] for INR taken on 4/4 - this has already been addressed by [facility].    Virginia Alcaraz, RN, BSN, PHN  Anticoagulation Nurse  [phone]         MEDICINE

## 2022-04-11 ENCOUNTER — ANTICOAGULATION THERAPY VISIT (OUTPATIENT)
Dept: ANTICOAGULATION | Facility: CLINIC | Age: 67
End: 2022-04-11
Payer: COMMERCIAL

## 2022-04-11 DIAGNOSIS — D68.59 THROMBOPHILIA (H): Primary | ICD-10-CM

## 2022-04-11 LAB — INR (EXTERNAL): 1.7 (ref 0.9–1.1)

## 2022-04-11 NOTE — PROGRESS NOTES
ANTICOAGULATION MANAGEMENT     Matt Rodriguez 66 year old male is on warfarin with subtherapeutic INR result. (Goal INR 2.0-3.0)    Recent labs: (last 7 days)     04/11/22  1040   INR 1.7*       ASSESSMENT       Source(s): Chart Review and Patient/Caregiver Call       Warfarin doses taken: Warfarin taken as instructed    Diet: Increased greens/vitamin K in diet; plans to resume previous intake    New illness, injury, or hospitalization: No    Medication/supplement changes: None noted    Signs or symptoms of bleeding or clotting: No    Previous INR: Therapeutic last visit; previously outside of goal range    Additional findings: None       PLAN     Recommended plan for temporary change(s) affecting INR     Dosing Instructions: booster dose then continue your current warfarin dose with next INR in 1 week       Summary  As of 4/11/2022    Full warfarin instructions:  4/11: 7.5 mg; Otherwise 5 mg every day   Next INR check:  4/18/2022             Telephone call with Matt who verbalizes understanding and agrees to plan    Patient to recheck with home meter    Education provided: Please call back if any changes to your diet, medications or how you've been taking warfarin, Importance of consistent vitamin K intake, Monitoring for bleeding signs and symptoms and Monitoring for clotting signs and symptoms    Plan made per [facility] anticoagulation protocol    Zaida Nevarez RN  Anticoagulation Clinic  4/11/2022    _______________________________________________________________________     Anticoagulation Episode Summary     Current INR goal:  2.0-3.0   TTR:  88.0 % (5.7 mo)   Target end date:  Indefinite   Send INR reminders to:  [facility]    Indications    Thrombophilia (H) [D68.59]           Comments:  [employer] home meter         Anticoagulation Care Providers     Provider Role Specialty Phone number    Dl Allen MD Referring Family Medicine [phone]

## 2022-04-18 ENCOUNTER — ANTICOAGULATION THERAPY VISIT (OUTPATIENT)
Dept: ANTICOAGULATION | Facility: CLINIC | Age: 67
End: 2022-04-18
Payer: COMMERCIAL

## 2022-04-18 DIAGNOSIS — D68.59 THROMBOPHILIA (H): Primary | ICD-10-CM

## 2022-04-18 LAB — INR (EXTERNAL): 2.4 (ref 0.9–1.1)

## 2022-04-18 NOTE — PROGRESS NOTES
ANTICOAGULATION MANAGEMENT     Matt Rodriguez 66 year old male is on warfarin with therapeutic INR result. (Goal INR 2.0-3.0)    Recent labs: (last 7 days)     04/18/22  0910   INR 2.4*       ASSESSMENT       Source(s): Chart Review    Previous INR was Therapeutic last visit; previously outside of goal range    Medication, diet, health changes since last INR chart reviewed; none identified           PLAN     Recommended plan for no diet, medication or health factor changes affecting INR     Dosing Instructions: continue your current warfarin dose with next INR in 1 week       Summary  As of 4/18/2022    Full warfarin instructions:  5 mg every day   Next INR check:  4/25/2022             Detailed voice message left for Matt with dosing instructions and follow up date.     Patient to recheck with home meter    Education provided: Please call back if any changes to your diet, medications or how you've been taking warfarin    Plan made per [facility] anticoagulation protocol    Zaida Nevarez RN  Anticoagulation Clinic  4/18/2022    _______________________________________________________________________     Anticoagulation Episode Summary     Current INR goal:  2.0-3.0   TTR:  86.8 % (5.9 mo)   Target end date:  Indefinite   Send INR reminders to:  [facility]    Indications    Thrombophilia (H) [D68.59]           Comments:  [employer] home meter         Anticoagulation Care Providers     Provider Role Specialty Phone number    Dl Allen MD Referring Family Medicine [phone]

## 2022-04-25 ENCOUNTER — ANTICOAGULATION THERAPY VISIT (OUTPATIENT)
Dept: ANTICOAGULATION | Facility: CLINIC | Age: 67
End: 2022-04-25
Payer: COMMERCIAL

## 2022-04-25 ENCOUNTER — TRANSFERRED RECORDS (OUTPATIENT)
Dept: HEALTH INFORMATION MANAGEMENT | Facility: CLINIC | Age: 67
End: 2022-04-25
Payer: COMMERCIAL

## 2022-04-25 DIAGNOSIS — D68.59 THROMBOPHILIA (H): Primary | ICD-10-CM

## 2022-04-25 LAB — INR (EXTERNAL): 2.9 (ref 0.9–1.1)

## 2022-04-25 NOTE — PROGRESS NOTES
ANTICOAGULATION MANAGEMENT     Matt Rodriguez 66 year old male is on warfarin with therapeutic INR result. (Goal INR 2.0-3.0)    Recent labs: (last 7 days)     04/25/22  1054   INR 2.9*       ASSESSMENT       Source(s): Chart Review and Patient/Caregiver Call       Warfarin doses taken: Warfarin taken as instructed    Diet: Patient reports a couple beers over the weekend at a birthday party    New illness, injury, or hospitalization: No    Medication/supplement changes: None noted    Signs or symptoms of bleeding or clotting: No    Previous INR: Therapeutic last visit; previously outside of goal range    Additional findings: None       PLAN     Recommended plan for temporary change(s) affecting INR     Dosing Instructions: continue your current warfarin dose with next INR in 1 week       Summary  As of 4/25/2022    Full warfarin instructions:  5 mg every day   Next INR check:  5/2/2022             Telephone call with  Matt who verbalizes understanding and agrees to plan    Patient to recheck with home meter    Education provided: Please call back if any changes to your diet, medications or how you've been taking warfarin    Plan made per [facility] anticoagulation protocol    Jany Mast RN  Anticoagulation Clinic  4/25/2022    _______________________________________________________________________     Anticoagulation Episode Summary     Current INR goal:  2.0-3.0   TTR:  87.3 % (6.2 mo)   Target end date:  Indefinite   Send INR reminders to:  [facility]    Indications    Thrombophilia (H) [D68.59]           Comments:  [facility] home meter         Anticoagulation Care Providers     Provider Role Specialty Phone number    Dl Allen MD Referring Family Medicine [phone]

## 2022-05-02 ENCOUNTER — ANTICOAGULATION THERAPY VISIT (OUTPATIENT)
Dept: ANTICOAGULATION | Facility: CLINIC | Age: 67
End: 2022-05-02
Payer: COMMERCIAL

## 2022-05-02 ENCOUNTER — TRANSFERRED RECORDS (OUTPATIENT)
Dept: HEALTH INFORMATION MANAGEMENT | Facility: CLINIC | Age: 67
End: 2022-05-02
Payer: COMMERCIAL

## 2022-05-02 DIAGNOSIS — D68.59 THROMBOPHILIA (H): Primary | ICD-10-CM

## 2022-05-02 LAB — INR (EXTERNAL): 2.3 (ref 0.9–1.1)

## 2022-05-02 NOTE — PROGRESS NOTES
ANTICOAGULATION MANAGEMENT     Matt Rodriguez 66 year old male is on warfarin with therapeutic INR result. (Goal INR 2.0-3.0)    Recent labs: (last 7 days)     05/02/22  1000   INR 2.3*       ASSESSMENT       Source(s): Chart Review and Patient/Caregiver Call       Warfarin doses taken: Warfarin taken as instructed    Diet: No new diet changes identified    New illness, injury, or hospitalization: No    Medication/supplement changes: None noted    Signs or symptoms of bleeding or clotting: No    Previous INR: Therapeutic last 2(+) visits    Additional findings: None       PLAN     Recommended plan for no diet, medication or health factor changes affecting INR     Dosing Instructions: continue your current warfarin dose with next INR in 1 week       Summary  As of 5/2/2022    Full warfarin instructions:  5 mg every day   Next INR check:  5/9/2022             Detailed voice message left for Matt with dosing instructions and follow up date.     Patient to recheck with home meter    Education provided: Please call back if any changes to your diet, medications or how you've been taking warfarin    Plan made per [facility] anticoagulation protocol    Ketty Myers, RN  Anticoagulation Clinic  5/2/2022    _______________________________________________________________________     Anticoagulation Episode Summary     Current INR goal:  2.0-3.0   TTR:  87.8 % (6.4 mo)   Target end date:  Indefinite   Send INR reminders to:  [facility]    Indications    Thrombophilia (H) [D68.59]           Comments:  [facility] home meter         Anticoagulation Care Providers     Provider Role Specialty Phone number    Dl Allen MD Referring Family Medicine [phone]

## 2022-05-09 ENCOUNTER — ANTICOAGULATION THERAPY VISIT (OUTPATIENT)
Dept: ANTICOAGULATION | Facility: CLINIC | Age: 67
End: 2022-05-09
Payer: COMMERCIAL

## 2022-05-09 ENCOUNTER — TRANSFERRED RECORDS (OUTPATIENT)
Dept: HEALTH INFORMATION MANAGEMENT | Facility: CLINIC | Age: 67
End: 2022-05-09
Payer: COMMERCIAL

## 2022-05-09 DIAGNOSIS — D68.59 THROMBOPHILIA (H): Primary | ICD-10-CM

## 2022-05-09 LAB — INR (EXTERNAL): 2.4 (ref 0.9–1.1)

## 2022-05-09 NOTE — PROGRESS NOTES
ANTICOAGULATION MANAGEMENT     Matt Rodriguez 66 year old male is on warfarin with therapeutic INR result. (Goal INR 2.0-3.0)    Recent labs: (last 7 days)     05/09/22  1028   INR 2.4*       ASSESSMENT       Source(s): Chart Review and Patient/Caregiver Call       Warfarin doses taken: Warfarin taken as instructed    Diet: No new diet changes identified    New illness, injury, or hospitalization: No    Medication/supplement changes: None noted    Signs or symptoms of bleeding or clotting: No    Previous INR: Therapeutic last 2(+) visits    Additional findings: None       PLAN     Recommended plan for no diet, medication or health factor changes affecting INR     Dosing Instructions: continue your current warfarin dose with next INR in 1 week       Summary  As of 5/9/2022    Full warfarin instructions:  5 mg every day   Next INR check:  5/16/2022             Telephone call with Matt who verbalizes understanding and agrees to plan    Patient to recheck with home meter    Education provided: Please call back if any changes to your diet, medications or how you've been taking warfarin    Plan made per [facility] anticoagulation protocol    Zaida Nevarez RN  Anticoagulation Clinic  5/9/2022    _______________________________________________________________________     Anticoagulation Episode Summary     Current INR goal:  2.0-3.0   TTR:  88.2 % (6.6 mo)   Target end date:  Indefinite   Send INR reminders to:  [facility]    Indications    Thrombophilia (H) [D68.59]           Comments:  [facility] home meter         Anticoagulation Care Providers     Provider Role Specialty Phone number    Dl Allen MD Referring Family Medicine [phone]

## 2022-05-16 ENCOUNTER — ANTICOAGULATION THERAPY VISIT (OUTPATIENT)
Dept: ANTICOAGULATION | Facility: CLINIC | Age: 67
End: 2022-05-16
Payer: COMMERCIAL

## 2022-05-16 ENCOUNTER — TRANSFERRED RECORDS (OUTPATIENT)
Dept: HEALTH INFORMATION MANAGEMENT | Facility: CLINIC | Age: 67
End: 2022-05-16
Payer: COMMERCIAL

## 2022-05-16 DIAGNOSIS — D68.59 THROMBOPHILIA (H): Primary | ICD-10-CM

## 2022-05-16 LAB — INR (EXTERNAL): 2.5 (ref 0.9–1.1)

## 2022-05-16 NOTE — PROGRESS NOTES
ANTICOAGULATION MANAGEMENT     Matt Rodriguez 66 year old male is on warfarin with therapeutic INR result. (Goal INR 2.0-3.0)    Recent labs: (last 7 days)     05/16/22  0000   INR 2.5*       ASSESSMENT       Source(s): Chart Review and Patient/Caregiver Call       Warfarin doses taken: Warfarin taken as instructed    Diet: Had 2 alcoholic beverages over the weekend and one serving of greens    New illness, injury, or hospitalization: No    Medication/supplement changes: None noted    Signs or symptoms of bleeding or clotting: No    Previous INR: Therapeutic last 2(+) visits    Additional findings: None       PLAN     Recommended plan for no diet, medication or health factor changes affecting INR     Dosing Instructions: continue your current warfarin dose with next INR in 1 week       Summary  As of 5/16/2022    Full warfarin instructions:  5 mg every day   Next INR check:  5/23/2022             Telephone call with Matt who verbalizes understanding and agrees to plan    Patient to recheck with home meter    Education provided: Please call back if any changes to your diet, medications or how you've been taking warfarin, Potential interaction between warfarin and alcohol, Goal range and significance of current result and Importance of notifying clinic for changes in medications; a sooner lab recheck maybe needed.    Plan made per [facility] anticoagulation protocol    Martha Trujillo RN  Anticoagulation Clinic  5/16/2022    _______________________________________________________________________     Anticoagulation Episode Summary     Current INR goal:  2.0-3.0   TTR:  88.6 % (6.8 mo)   Target end date:  Indefinite   Send INR reminders to:  [facility]    Indications    Thrombophilia (H) [D68.59]           Comments:  [facility] home meter         Anticoagulation Care Providers     Provider Role Specialty Phone number    Dl Allen MD Referring Family Medicine [phone]

## 2022-05-23 ENCOUNTER — ANTICOAGULATION THERAPY VISIT (OUTPATIENT)
Dept: ANTICOAGULATION | Facility: CLINIC | Age: 67
End: 2022-05-23
Payer: COMMERCIAL

## 2022-05-23 ENCOUNTER — TRANSFERRED RECORDS (OUTPATIENT)
Dept: HEALTH INFORMATION MANAGEMENT | Facility: CLINIC | Age: 67
End: 2022-05-23

## 2022-05-23 DIAGNOSIS — D68.59 THROMBOPHILIA (H): Primary | ICD-10-CM

## 2022-05-23 LAB — INR (EXTERNAL): 2.8 (ref 0.9–1.1)

## 2022-05-23 NOTE — PROGRESS NOTES
ANTICOAGULATION MANAGEMENT     Matt Rodriguez 66 year old male is on warfarin with therapeutic INR result. (Goal INR 2.0-3.0)    Recent labs: (last 7 days)     05/23/22  0000   INR 2.8*       ASSESSMENT       Source(s): Chart Review and Patient/Caregiver Call       Warfarin doses taken: Warfarin taken as instructed    Diet: No new diet changes identified    New illness, injury, or hospitalization: No    Medication/supplement changes: centrum silver men, 60 mcg daily, not taking on a regular basis    Signs or symptoms of bleeding or clotting: No    Previous INR: Therapeutic last 2(+) visits    Additional findings: None       PLAN     Recommended plan for no diet, medication or health factor changes affecting INR     Dosing Instructions: continue your current warfarin dose with next INR in 1 week       Summary  As of 5/23/2022    Full warfarin instructions:  5 mg every day   Next INR check:  5/30/2022             Telephone call with Matt who verbalizes understanding and agrees to plan    Patient to recheck with home meter    Education provided: Discussed with the patient MBE and noted that we call only on out of range INR's and one week after/or till in range. Patient agree's to this starting next week.    Plan made per [facility] anticoagulation protocol    Carmita uQiles, RN  Anticoagulation Clinic  5/23/2022    _______________________________________________________________________     Anticoagulation Episode Summary     Current INR goal:  2.0-3.0   TTR:  89.0 % (7.1 mo)   Target end date:  Indefinite   Send INR reminders to:  [facility]    Indications    Thrombophilia (H) [D68.59]           Comments:  [employer] home meter, MBE         Anticoagulation Care Providers     Provider Role Specialty Phone number    Dl Allen MD Referring Family Medicine [phone]

## 2022-05-30 ENCOUNTER — TRANSFERRED RECORDS (OUTPATIENT)
Dept: HEALTH INFORMATION MANAGEMENT | Facility: CLINIC | Age: 67
End: 2022-05-30
Payer: COMMERCIAL

## 2022-05-30 LAB — INR (EXTERNAL): 2.9 (ref 0.9–1.1)

## 2022-05-31 ENCOUNTER — ANTICOAGULATION THERAPY VISIT (OUTPATIENT)
Dept: ANTICOAGULATION | Facility: CLINIC | Age: 67
End: 2022-05-31
Payer: COMMERCIAL

## 2022-05-31 DIAGNOSIS — D68.59 THROMBOPHILIA (H): Primary | ICD-10-CM

## 2022-05-31 NOTE — PROGRESS NOTES
ANTICOAGULATION  MANAGEMENT-Home Monitor Managed by Exception    Matt CROSS Marjoriecristine 66 year old male is on warfarin with therapeutic INR result. (Goal INR 2.0-3.0)    Recent labs: (last 7 days)     05/30/22  0000   INR 2.9*         Previous INR was Therapeutic    Medication, diet, health changes since last INR:chart reviewed; none identified    Contacted within the last 12 weeks by phone on 5/23/22      LISA Howellian was NOT contacted regarding therapeutic result today per home monitoring policy manage by exception agreement.   Current warfarin dose is to be continued:     Summary  As of 5/31/2022    Full warfarin instructions:  5 mg every day   Next INR check:  6/6/2022           ?   Sincere Parson RN  Anticoagulation Clinic  5/31/2022    _______________________________________________________________________     Anticoagulation Episode Summary     Current INR goal:  2.0-3.0   TTR:  89.3 % (7.3 mo)   Target end date:  Indefinite   Send INR reminders to:  [facility]    Indications    Thrombophilia (H) [F24.59]           Comments:  [employer] home meter, MBE         Anticoagulation Care Providers     Provider Role Specialty Phone number    Dl Allen MD Referring Family Medicine [phone]

## 2022-06-07 ENCOUNTER — DOCUMENTATION ONLY (OUTPATIENT)
Dept: ANTICOAGULATION | Facility: CLINIC | Age: 67
End: 2022-06-07
Payer: COMMERCIAL

## 2022-06-07 ENCOUNTER — TRANSFERRED RECORDS (OUTPATIENT)
Dept: HEALTH INFORMATION MANAGEMENT | Facility: CLINIC | Age: 67
End: 2022-06-07

## 2022-06-07 DIAGNOSIS — D68.59 THROMBOPHILIA (H): Primary | ICD-10-CM

## 2022-06-07 LAB — INR (EXTERNAL): 3 (ref 0.9–1.1)

## 2022-06-07 NOTE — PROGRESS NOTES
ANTICOAGULATION  MANAGEMENT-Home Monitor Managed by Exception    Matt CROSS Marjoreicristine 67 year old male is on warfarin with therapeutic INR result. (Goal INR 2.0-3.0)    Recent labs: (last 7 days)     06/07/22  0906   INR 3.0*         Previous INR was Therapeutic    Medication, diet, health changes since last INR:chart reviewed; none identified    Contacted within the last 12 weeks by phone on 5/23/22      LISA Howellian was NOT contacted regarding therapeutic result today per home monitoring policy manage by exception agreement.   Current warfarin dose is to be continued:     Summary  As of 6/7/2022    Full warfarin instructions:  5 mg every day   Next INR check:  6/14/2022           ?   Zaida Nevarez RN  Anticoagulation Clinic  6/7/2022    _______________________________________________________________________     Anticoagulation Episode Summary     Current INR goal:  2.0-3.0   TTR:  89.7 % (7.6 mo)   Target end date:  Indefinite   Send INR reminders to:  [facility]    Indications    Thrombophilia (H) [D68.59]           Comments:  [employer] home meter, FATOUMATA         Anticoagulation Care Providers     Provider Role Specialty Phone number    Dl Allen MD Referring Family Medicine [phone]

## 2022-06-14 ENCOUNTER — TRANSFERRED RECORDS (OUTPATIENT)
Dept: HEALTH INFORMATION MANAGEMENT | Facility: CLINIC | Age: 67
End: 2022-06-14

## 2022-06-14 ENCOUNTER — ANTICOAGULATION THERAPY VISIT (OUTPATIENT)
Dept: ANTICOAGULATION | Facility: CLINIC | Age: 67
End: 2022-06-14
Payer: COMMERCIAL

## 2022-06-14 DIAGNOSIS — D68.59 THROMBOPHILIA (H): Primary | ICD-10-CM

## 2022-06-14 LAB — INR (EXTERNAL): 3.1 (ref 0.9–1.1)

## 2022-06-14 NOTE — PROGRESS NOTES
ANTICOAGULATION MANAGEMENT     Matt Rodriguez 67 year old male is on warfarin with supratherapeutic INR result. (Goal INR 2.0-3.0)    Recent labs: (last 7 days)     06/14/22  0000   INR 3.1*       ASSESSMENT       Source(s): Chart Review    Previous INR was Therapeutic last 2(+) visits    Medication, diet, health changes since last INR chart reviewed; none identified           PLAN     Unable to reach Matt today.    Sent Bauzaarhart with assessment and dosing for tonight. Voicemail is full and unable to leave a message.    Follow up required to confirm warfarin dose taken and assess for changes    Carmita Quiles, RN  Anticoagulation Clinic  6/14/2022

## 2022-06-15 NOTE — PROGRESS NOTES
ANTICOAGULATION MANAGEMENT     Matt Rodriguez 67 year old male is on warfarin with supratherapeutic INR result. (Goal INR 2.0-3.0)    Recent labs: (last 7 days)     06/14/22  0000   INR 3.1*       ASSESSMENT       Source(s): Chart Review and Patient/Caregiver Call       Warfarin doses taken: Warfarin taken as instructed    Diet: No new diet changes identified    New illness, injury, or hospitalization: No    Medication/supplement changes: None noted    Signs or symptoms of bleeding or clotting: No    Previous INR: Therapeutic last 2(+) visits    Additional findings: None       PLAN     Recommended plan for no diet, medication or health factor changes affecting INR     Dosing Instructions: continue your current warfarin dose with next INR in 1 week       Summary  As of 6/14/2022    Full warfarin instructions:  5 mg every day   Next INR check:  6/21/2022             Telephone call with Matt who verbalizes understanding and agrees to plan    Patient to recheck with home meter    Education provided: None required    Plan made per [facility] anticoagulation protocol    Carmita Quiles, RN  Anticoagulation Clinic  6/15/2022    _______________________________________________________________________     Anticoagulation Episode Summary     Current INR goal:  2.0-3.0   TTR:  87.2 % (7.8 mo)   Target end date:  Indefinite   Send INR reminders to:  [facility]    Indications    Thrombophilia (H) [V91.59]           Comments:  [employer] home meter, MBE         Anticoagulation Care Providers     Provider Role Specialty Phone number    Dl Allen MD Referring Family Medicine [phone]

## 2022-06-20 ENCOUNTER — ANTICOAGULATION THERAPY VISIT (OUTPATIENT)
Dept: ANTICOAGULATION | Facility: CLINIC | Age: 67
End: 2022-06-20
Payer: COMMERCIAL

## 2022-06-20 ENCOUNTER — TRANSFERRED RECORDS (OUTPATIENT)
Dept: HEALTH INFORMATION MANAGEMENT | Facility: CLINIC | Age: 67
End: 2022-06-20

## 2022-06-20 DIAGNOSIS — D68.59 THROMBOPHILIA (H): Primary | ICD-10-CM

## 2022-06-20 LAB — INR (EXTERNAL): 2.9 (ref 0.9–1.1)

## 2022-06-20 NOTE — PROGRESS NOTES
ANTICOAGULATION MANAGEMENT     Matt Rodriguez 67 year old male is on warfarin with therapeutic INR result. (Goal INR 2.0-3.0)    Recent labs: (last 7 days)     06/20/22  0956   INR 2.9*       ASSESSMENT       Source(s): Chart Review and Patient/Caregiver Call       Warfarin doses taken: Warfarin taken as instructed    Diet: No new diet changes identified    New illness, injury, or hospitalization: No    Medication/supplement changes: None noted    Signs or symptoms of bleeding or clotting: No    Previous INR: Supratherapeutic    Additional findings: None       PLAN     Recommended plan for no diet, medication or health factor changes affecting INR     Dosing Instructions: continue your current warfarin dose with next INR in 1 week       Summary  As of 6/20/2022    Full warfarin instructions:  5 mg every day   Next INR check:  6/27/2022             Telephone call with Matt who agrees to plan and repeated back plan correctly    Patient to recheck with home meter    Education provided: Please call back if any changes to your diet, medications or how you've been taking warfarin and Resume manage by exception with home monitor. Continue to submit INR results to home monitor company.You will only be called when your result is out of range. Please call and notify [facility] if new medication started, dose missed, signs or symptoms of bleeding or clotting, or a surgery/procedure is scheduled.    Plan made per [facility] anticoagulation protocol    Zaida Nevarez RN  Anticoagulation Clinic  6/20/2022    _______________________________________________________________________     Anticoagulation Episode Summary     Current INR goal:  2.0-3.0   TTR:  86.2 % (8 mo)   Target end date:  Indefinite   Send INR reminders to:  [facility]    Indications    Thrombophilia (H) [D68.59]           Comments:  [employer] home meter, MBE         Anticoagulation Care Providers     Provider Role Specialty Phone number    Dl Allen MD Referring Family  Medicine [phone]

## 2022-06-27 ENCOUNTER — TRANSFERRED RECORDS (OUTPATIENT)
Dept: HEALTH INFORMATION MANAGEMENT | Facility: CLINIC | Age: 67
End: 2022-06-27

## 2022-06-27 ENCOUNTER — ANTICOAGULATION THERAPY VISIT (OUTPATIENT)
Dept: ANTICOAGULATION | Facility: CLINIC | Age: 67
End: 2022-06-27

## 2022-06-27 DIAGNOSIS — D68.59 THROMBOPHILIA (H): Primary | ICD-10-CM

## 2022-06-27 LAB — INR (EXTERNAL): 3.3 (ref 0.9–1.1)

## 2022-06-27 NOTE — PROGRESS NOTES
ANTICOAGULATION MANAGEMENT     Matt Rodriguez 67 year old male is on warfarin with supratherapeutic INR result. (Goal INR 2.0-3.0)    Recent labs: (last 7 days)     06/27/22  0000   INR 3.3*       ASSESSMENT       Source(s): Chart Review    Previous INR was Therapeutic last visit; previously outside of goal range    Medication, diet, health changes since last INR chart reviewed; none identified           PLAN     Unable to reach Matt today.    No instructions provided. Unable to leave voicemail.    Follow up required to confirm warfarin dose taken and assess for changes    Carmita Quiles, RN  Anticoagulation Clinic  6/27/2022

## 2022-06-30 NOTE — PROGRESS NOTES
ANTICOAGULATION MANAGEMENT     Matt Rodriguez 67 year old male is on warfarin with supratherapeutic INR result. (Goal INR 2.0-3.0)    Recent labs: (last 7 days)     06/27/22  0000   INR 3.3*       ASSESSMENT       Source(s): Chart Review and Patient/Caregiver Call       Warfarin doses taken: Warfarin taken as instructed    Diet: Change in alcohol intake may be affecting INR. increased alcohol, blueberries in diet     New illness, injury, or hospitalization: No    Medication/supplement changes: None noted    Signs or symptoms of bleeding or clotting: No    Previous INR: Therapeutic last visit; previously outside of goal range    Additional findings: called wife's number. She notes patient phone is not working.       PLAN     Recommended plan for temporary change(s) affecting INR     Dosing Instructions: continue your current warfarin dose with next INR in 1 week       Summary  As of 6/27/2022    Full warfarin instructions:  5 mg every day   Next INR check:  7/5/2022             Telephone call with Matt who verbalizes understanding and agrees to plan    Patient to recheck with home meter    Education provided: Importance of consistent vitamin K intake and Potential interaction between warfarin and alcohol    Plan made per [facility] anticoagulation protocol    Carmita Quiles, RN  Anticoagulation Clinic  6/30/2022    _______________________________________________________________________     Anticoagulation Episode Summary     Current INR goal:  2.0-3.0   TTR:  84.5 % (8.2 mo)   Target end date:  Indefinite   Send INR reminders to:  [facility]    Indications    Thrombophilia (H) [D68.59]           Comments:  [employer] home meter, MBE         Anticoagulation Care Providers     Provider Role Specialty Phone number    Dl Allen MD Referring Family Medicine [phone]

## 2022-07-05 ENCOUNTER — TRANSFERRED RECORDS (OUTPATIENT)
Dept: HEALTH INFORMATION MANAGEMENT | Facility: CLINIC | Age: 67
End: 2022-07-05

## 2022-07-05 ENCOUNTER — ANTICOAGULATION THERAPY VISIT (OUTPATIENT)
Dept: ANTICOAGULATION | Facility: CLINIC | Age: 67
End: 2022-07-05

## 2022-07-05 DIAGNOSIS — D68.59 THROMBOPHILIA (H): Primary | ICD-10-CM

## 2022-07-05 LAB — INR (EXTERNAL): 3.4 (ref 0.9–1.1)

## 2022-07-05 NOTE — PROGRESS NOTES
ANTICOAGULATION MANAGEMENT     Matt Rodriguez 67 year old male is on warfarin with supratherapeutic INR result. (Goal INR 2.0-3.0)    Recent labs: (last 7 days)     07/05/22  1215   INR 3.4*       ASSESSMENT       Source(s): Chart Review and Patient/Caregiver Call       Warfarin doses taken: Warfarin taken as instructed    Diet: No new diet changes identified    New illness, injury, or hospitalization: No    Medication/supplement changes: None noted    Signs or symptoms of bleeding or clotting: No    Previous INR: Supratherapeutic    Additional findings: Patient denies any changes overall and has been on same maint dose for some time. Will leave maint dose as is but recommended dose change next week if INR still supra       PLAN     Recommended plan for no diet, medication or health factor changes affecting INR     Dosing Instructions: continue your current warfarin dose with next INR in 1 week       Summary  As of 7/5/2022    Full warfarin instructions:  5 mg every day   Next INR check:  7/12/2022             Telephone call with Matt who verbalizes understanding and agrees to plan    Patient to recheck with home meter    Education provided: Please call back if any changes to your diet, medications or how you've been taking warfarin, Monitoring for bleeding signs and symptoms, Monitoring for clotting signs and symptoms and When to seek medical attention/emergency care    Plan made per [facility] anticoagulation protocol    Ketty Myers, RN  Anticoagulation Clinic  7/5/2022    _______________________________________________________________________     Anticoagulation Episode Summary     Current INR goal:  2.0-3.0   TTR:  81.7 % (8.5 mo)   Target end date:  Indefinite   Send INR reminders to:  [facility]    Indications    Thrombophilia (H) [D68.59]           Comments:  [employer] home meter, MBE         Anticoagulation Care Providers     Provider Role Specialty Phone number    Dl Allen MD Referring Family  Medicine [phone]

## 2022-07-11 ENCOUNTER — TRANSFERRED RECORDS (OUTPATIENT)
Dept: HEALTH INFORMATION MANAGEMENT | Facility: CLINIC | Age: 67
End: 2022-07-11

## 2022-07-11 ENCOUNTER — ANTICOAGULATION THERAPY VISIT (OUTPATIENT)
Dept: ANTICOAGULATION | Facility: CLINIC | Age: 67
End: 2022-07-11

## 2022-07-11 DIAGNOSIS — D68.59 THROMBOPHILIA (H): Primary | ICD-10-CM

## 2022-07-11 LAB — INR (EXTERNAL): 2.1 (ref 0.9–1.1)

## 2022-07-11 NOTE — PROGRESS NOTES
ANTICOAGULATION MANAGEMENT     Matt Rodriguez 67 year old male is on warfarin with therapeutic INR result. (Goal INR 2.0-3.0)    Recent labs: (last 7 days)     07/11/22  1100   INR 2.1*       ASSESSMENT       Source(s): Chart Review and Patient/Caregiver Call       Warfarin doses taken: Warfarin taken as instructed    Diet: No new diet changes identified    New illness, injury, or hospitalization: No    Medication/supplement changes: None noted    Signs or symptoms of bleeding or clotting: No    Previous INR: Supratherapeutic    Additional findings: None       PLAN     Recommended plan for no diet, medication or health factor changes affecting INR     Dosing Instructions: continue your current warfarin dose with next INR in 1 week       Summary  As of 7/11/2022    Full warfarin instructions:  5 mg every day   Next INR check:  7/18/2022             Telephone call with Matt who verbalizes understanding and agrees to plan    Patient to recheck with home meter    Education provided: Importance of consistent vitamin K intake, Goal range and significance of current result and Resume manage by exception with home monitor. Continue to submit INR results to home monitor company.You will only be called when your result is out of range. Please call and notify [facility] if new medication started, dose missed, signs or symptoms of bleeding or clotting, or a surgery/procedure is scheduled.    Plan made per [facility] anticoagulation protocol    Danii Abdul RN  Anticoagulation Clinic  7/11/2022    _______________________________________________________________________     Anticoagulation Episode Summary     Current INR goal:  2.0-3.0   TTR:  81.4 % (8.7 mo)   Target end date:  Indefinite   Send INR reminders to:  [facility]    Indications    Thrombophilia (H) [M06.59]           Comments:  [employer] home meter, MBE         Anticoagulation Care Providers     Provider Role Specialty Phone number    Dl Allen MD Referring Family  Medicine [phone]

## 2022-07-19 ENCOUNTER — ANTICOAGULATION THERAPY VISIT (OUTPATIENT)
Dept: ANTICOAGULATION | Facility: CLINIC | Age: 67
End: 2022-07-19

## 2022-07-19 ENCOUNTER — TRANSFERRED RECORDS (OUTPATIENT)
Dept: HEALTH INFORMATION MANAGEMENT | Facility: CLINIC | Age: 67
End: 2022-07-19

## 2022-07-19 DIAGNOSIS — D68.59 THROMBOPHILIA (H): Primary | ICD-10-CM

## 2022-07-19 LAB — INR (EXTERNAL): 2.5 (ref 0.9–1.1)

## 2022-07-19 NOTE — PROGRESS NOTES
ANTICOAGULATION  MANAGEMENT-Home Monitor Managed by Exception    Matt CROSS Michael 67 year old male is on warfarin with therapeutic INR result. (Goal INR 2.0-3.0)    Recent labs: (last 7 days)     07/19/22  0000   INR 2.5*         Previous INR was Therapeutic    Medication, diet, health changes since last INR:chart reviewed; none identified    Contacted within the last 12 weeks by phone on 07/11/2022      LISA     Matt was NOT contacted regarding therapeutic result today per home monitoring policy manage by exception agreement.   Current warfarin dose is to be continued:     Summary  As of 7/19/2022    Full warfarin instructions:  5 mg every day   Next INR check:  7/26/2022           ?   Carmita Quiles RN  Anticoagulation Clinic  7/19/2022    _______________________________________________________________________     Anticoagulation Episode Summary     Current INR goal:  2.0-3.0   TTR:  82.0 % (9 mo)   Target end date:  Indefinite   Send INR reminders to:  [facility]    Indications    Thrombophilia (H) [D68.59]           Comments:  [employer] home meter, FATOUMATA         Anticoagulation Care Providers     Provider Role Specialty Phone number    Dl Allen MD Referring Family Medicine [phone]

## 2022-07-19 NOTE — PROGRESS NOTES
Incoming fax from [facility] [[email or web address]] 7/19/22    INR result: 2.5

## 2022-07-27 ENCOUNTER — TRANSFERRED RECORDS (OUTPATIENT)
Dept: HEALTH INFORMATION MANAGEMENT | Facility: CLINIC | Age: 67
End: 2022-07-27

## 2022-07-27 ENCOUNTER — DOCUMENTATION ONLY (OUTPATIENT)
Dept: ANTICOAGULATION | Facility: CLINIC | Age: 67
End: 2022-07-27

## 2022-07-27 DIAGNOSIS — D68.59 THROMBOPHILIA (H): Primary | ICD-10-CM

## 2022-07-27 LAB — INR (EXTERNAL): 2.4 (ref 0.9–1.1)

## 2022-08-03 ENCOUNTER — DOCUMENTATION ONLY (OUTPATIENT)
Dept: ANTICOAGULATION | Facility: CLINIC | Age: 67
End: 2022-08-03

## 2022-08-03 DIAGNOSIS — D68.59 THROMBOPHILIA (H): Primary | ICD-10-CM

## 2022-08-03 LAB — INR (EXTERNAL): 2.8 (ref 0.9–1.1)

## 2022-08-03 NOTE — PROGRESS NOTES
ANTICOAGULATION  MANAGEMENT-Home Monitor Managed by Exception    Matt CROSS Marjoriecristine 67 year old male is on warfarin with therapeutic INR result. (Goal INR 2.0-3.0)    Recent labs: (last 7 days)     08/03/22  0000   INR 2.8*         Previous INR was Therapeutic    Medication, diet, health changes since last INR:chart reviewed; none identified    Contacted within the last 12 weeks by phone on 07/11/2022          LISA     Matt was NOT contacted regarding therapeutic result today per home monitoring policy manage by exception agreement.   Current warfarin dose is to be continued:     Summary  As of 8/3/2022    Full warfarin instructions:  5 mg every day   Next INR check:  8/10/2022           ?   Jany Mast RN  Anticoagulation Clinic  8/3/2022    _______________________________________________________________________     Anticoagulation Episode Summary     Current INR goal:  2.0-3.0   TTR:  82.9 % (9.5 mo)   Target end date:  Indefinite   Send INR reminders to:  [facility]    Indications    Thrombophilia (H) [D68.59]           Comments:  [employer] home meter, FATOUMATA         Anticoagulation Care Providers     Provider Role Specialty Phone number    Dl Allen MD Referring Family Medicine [phone]

## 2022-08-10 ENCOUNTER — TRANSFERRED RECORDS (OUTPATIENT)
Dept: HEALTH INFORMATION MANAGEMENT | Facility: CLINIC | Age: 67
End: 2022-08-10

## 2022-08-10 ENCOUNTER — DOCUMENTATION ONLY (OUTPATIENT)
Dept: ANTICOAGULATION | Facility: CLINIC | Age: 67
End: 2022-08-10

## 2022-08-10 DIAGNOSIS — D68.59 THROMBOPHILIA (H): Primary | ICD-10-CM

## 2022-08-10 LAB — INR (EXTERNAL): 2.9 (ref 0.9–1.1)

## 2022-08-10 NOTE — PROGRESS NOTES
ANTICOAGULATION  MANAGEMENT-Home Monitor Managed by Exception    Matt CROSS Michael 67 year old male is on warfarin with therapeutic INR result. (Goal INR 2.0-3.0)    Recent labs: (last 7 days)     08/10/22  0000   INR 2.9*         Previous INR was Therapeutic    Medication, diet, health changes since last INR:chart reviewed; none identified    Contacted within the last 12 weeks by phone on 07/11/2022          LISA     Matt was NOT contacted regarding therapeutic result today per home monitoring policy manage by exception agreement.   Current warfarin dose is to be continued:     Summary  As of 8/10/2022    Full warfarin instructions:  5 mg every day   Next INR check:  8/24/2022           ?   Jany Mast RN  Anticoagulation Clinic  8/10/2022    _______________________________________________________________________     Anticoagulation Episode Summary     Current INR goal:  2.0-3.0   TTR:  83.4 % (9.7 mo)   Target end date:  Indefinite   Send INR reminders to:  [facility]    Indications    Thrombophilia (H) [D68.59]           Comments:  [employer] home meter, FATOUMATA         Anticoagulation Care Providers     Provider Role Specialty Phone number    Dl Allen MD Referring Family Medicine [phone]

## 2022-08-16 ENCOUNTER — DOCUMENTATION ONLY (OUTPATIENT)
Dept: ANTICOAGULATION | Facility: CLINIC | Age: 67
End: 2022-08-16

## 2022-08-16 DIAGNOSIS — D68.59 THROMBOPHILIA (H): Primary | ICD-10-CM

## 2022-08-16 LAB — INR (EXTERNAL): 2.3 (ref 0.9–1.1)

## 2022-08-16 NOTE — PROGRESS NOTES
ANTICOAGULATION  MANAGEMENT-Home Monitor Managed by Exception    Matt CROSS Marjoriecristine 67 year old male is on warfarin with therapeutic INR result. (Goal INR 2.0-3.0)    Recent labs: (last 7 days)     08/16/22  0846   INR 2.3*         Previous INR was Therapeutic    Medication, diet, health changes since last INR:chart reviewed; none identified    Contacted within the last 12 weeks by phone on 7/11/22      LISA Howellian was NOT contacted regarding therapeutic result today per home monitoring policy manage by exception agreement.   Current warfarin dose is to be continued:     Summary  As of 8/16/2022    Full warfarin instructions:  5 mg every day   Next INR check:  8/23/2022           ?   Zaida Nevarez RN  Anticoagulation Clinic  8/16/2022    _______________________________________________________________________     Anticoagulation Episode Summary     Current INR goal:  2.0-3.0   TTR:  83.8 % (9.9 mo)   Target end date:  Indefinite   Send INR reminders to:  [facility]    Indications    Thrombophilia (H) [D68.59]           Comments:  [employer] home meter, FATOUMATA         Anticoagulation Care Providers     Provider Role Specialty Phone number    Dl Allen MD Referring Family Medicine [phone]

## 2022-08-23 ENCOUNTER — TRANSFERRED RECORDS (OUTPATIENT)
Dept: HEALTH INFORMATION MANAGEMENT | Facility: CLINIC | Age: 67
End: 2022-08-23

## 2022-08-23 ENCOUNTER — ANTICOAGULATION THERAPY VISIT (OUTPATIENT)
Dept: ANTICOAGULATION | Facility: CLINIC | Age: 67
End: 2022-08-23

## 2022-08-23 DIAGNOSIS — D68.59 THROMBOPHILIA (H): Primary | ICD-10-CM

## 2022-08-23 LAB — INR (EXTERNAL): 1.7 (ref 0.9–1.1)

## 2022-08-23 NOTE — PROGRESS NOTES
ANTICOAGULATION MANAGEMENT     Matt Rodriguez 67 year old male is on warfarin with subtherapeutic INR result. (Goal INR 2.0-3.0)    Recent labs: (last 7 days)     08/23/22  1053   INR 1.7*       ASSESSMENT       Source(s): Chart Review and Patient/Caregiver Call       Warfarin doses taken: Missed dose(s) may be affecting INR    Diet: No new diet changes identified    New illness, injury, or hospitalization: No    Medication/supplement changes: None noted    Signs or symptoms of bleeding or clotting: Yes: hemorrhoid bleeding last weekend. This has since resolved    Previous INR: Therapeutic last 2(+) visits    Additional findings: patient missed two doses when traveling       PLAN     Recommended plan for temporary change(s) affecting INR     Dosing Instructions: booster dose then continue your current warfarin dose with next INR in 1 week       Summary  As of 8/23/2022    Full warfarin instructions:  8/23: 7.5 mg; Otherwise 5 mg every day   Next INR check:  8/30/2022             Telephone call with Matt who verbalizes understanding and agrees to plan    Patient to recheck with home meter    Education provided: Please call back if any changes to your diet, medications or how you've been taking warfarin, Importance of taking warfarin as instructed and Contact [phone]  with any changes, questions or concerns.     Plan made per [facility] anticoagulation protocol    Carmita Babb RN  Anticoagulation Clinic  8/23/2022    _______________________________________________________________________     Anticoagulation Episode Summary     Current INR goal:  2.0-3.0   TTR:  82.9 % (10.2 mo)   Target end date:  Indefinite   Send INR reminders to:  [facility]    Indications    Thrombophilia (H) [D68.59]           Comments:  [employer] home meter, MBANNE-MARIE         Anticoagulation Care Providers     Provider Role Specialty Phone number    Dl Allen MD Referring Family Medicine [phone]

## 2022-08-30 ENCOUNTER — ANTICOAGULATION THERAPY VISIT (OUTPATIENT)
Dept: ANTICOAGULATION | Facility: CLINIC | Age: 67
End: 2022-08-30

## 2022-08-30 ENCOUNTER — TRANSFERRED RECORDS (OUTPATIENT)
Dept: HEALTH INFORMATION MANAGEMENT | Facility: CLINIC | Age: 67
End: 2022-08-30

## 2022-08-30 DIAGNOSIS — D68.59 THROMBOPHILIA (H): Primary | ICD-10-CM

## 2022-08-30 LAB — INR (EXTERNAL): 2.9 (ref 0.9–1.1)

## 2022-08-30 NOTE — PROGRESS NOTES
Received a fax INR result for Matt from [employer]    INR result dated 8/30/2022 is 2.9    ANTICOAGULATION MANAGEMENT     Matt Rodriguez 67 year old male is on warfarin with therapeutic INR result. (Goal INR 2.0-3.0)    Recent labs: (last 7 days)     08/30/22  0000   INR 2.9*       ASSESSMENT       Source(s): Chart Review and Patient/Caregiver Call       Warfarin doses taken: Warfarin taken as instructed    Diet: No new diet changes identified    New illness, injury, or hospitalization: No    Medication/supplement changes: None noted    Signs or symptoms of bleeding or clotting: No    Previous INR: Subtherapeutic    Additional findings: None       PLAN     Recommended plan for no diet, medication or health factor changes affecting INR     Dosing Instructions: Continue your current warfarin dose with next INR in 1 week       Summary  As of 8/30/2022    Full warfarin instructions:  5 mg every day   Next INR check:  9/6/2022             Telephone call with Matt who verbalizes understanding and agrees to plan    Patient to recheck with home meter    Education provided: Please call back if any changes to your diet, medications or how you've been taking warfarin and Resume manage by exception with home monitor. Continue to submit INR results to home monitor company.You will only be called when your result is out of range. Please call and notify [facility] if new medication started, dose missed, signs or symptoms of bleeding or clotting, or a surgery/procedure is scheduled.    Plan made per [facility] anticoagulation protocol    Tanai Liriano RN  Anticoagulation Clinic  8/30/2022    _______________________________________________________________________     Anticoagulation Episode Summary     Current INR goal:  2.0-3.0   TTR:  82.8 % (10.4 mo)   Target end date:  Indefinite   Send INR reminders to:  [facility]    Indications    Thrombophilia (H) [D68.59]           Comments:  [employer] home meter, MBE         Anticoagulation Care  Providers     Provider Role Specialty Phone number    Dl Allen MD Referring Family Medicine [phone]

## 2022-09-02 ENCOUNTER — TELEPHONE (OUTPATIENT)
Dept: HEMATOLOGY | Facility: CLINIC | Age: 67
End: 2022-09-02

## 2022-09-02 NOTE — TELEPHONE ENCOUNTER
Called and left voicemail for patient about scheduling a return visit in 11/2022. Gave [phone] as call back number.

## 2022-09-06 ENCOUNTER — TRANSFERRED RECORDS (OUTPATIENT)
Dept: HEALTH INFORMATION MANAGEMENT | Facility: CLINIC | Age: 67
End: 2022-09-06

## 2022-09-06 ENCOUNTER — ANTICOAGULATION THERAPY VISIT (OUTPATIENT)
Dept: ANTICOAGULATION | Facility: CLINIC | Age: 67
End: 2022-09-06

## 2022-09-06 DIAGNOSIS — D68.59 THROMBOPHILIA (H): Primary | ICD-10-CM

## 2022-09-06 LAB — INR (EXTERNAL): 1.8 (ref 0.9–1.1)

## 2022-09-13 ENCOUNTER — ANTICOAGULATION THERAPY VISIT (OUTPATIENT)
Dept: ANTICOAGULATION | Facility: CLINIC | Age: 67
End: 2022-09-13

## 2022-09-13 ENCOUNTER — TRANSFERRED RECORDS (OUTPATIENT)
Dept: HEALTH INFORMATION MANAGEMENT | Facility: CLINIC | Age: 67
End: 2022-09-13

## 2022-09-13 DIAGNOSIS — D68.59 THROMBOPHILIA (H): Primary | ICD-10-CM

## 2022-09-13 LAB — INR (EXTERNAL): 2.6 (ref 0.9–1.1)

## 2022-09-13 NOTE — PROGRESS NOTES
ANTICOAGULATION MANAGEMENT     Matt Rodriguez 67 year old male is on warfarin with therapeutic INR result. (Goal INR 2.0-3.0)    Recent labs: (last 7 days)     09/13/22  0854   INR 2.6*       ASSESSMENT       Source(s): Chart Review and Patient/Caregiver Call       Warfarin doses taken: Warfarin taken as instructed    Diet: No new diet changes identified    New illness, injury, or hospitalization: No    Medication/supplement changes: None noted    Signs or symptoms of bleeding or clotting: No    Previous INR: Subtherapeutic    Additional findings: None       PLAN     Recommended plan for no diet, medication or health factor changes affecting INR     Dosing Instructions: Continue your current warfarin dose with next INR in 1 week       Summary  As of 9/13/2022    Full warfarin instructions:  5 mg every day   Next INR check:  9/20/2022             Telephone call with Matt who verbalizes understanding and agrees to plan    Patient to recheck with home meter    Education provided: Please call back if any changes to your diet, medications or how you've been taking warfarin and Resume manage by exception with home monitor. Continue to submit INR results to home monitor company.You will only be called when your result is out of range. Please call and notify [facility] if new medication started, dose missed, signs or symptoms of bleeding or clotting, or a surgery/procedure is scheduled.    Plan made per [facility] anticoagulation protocol    Zaida Nevarez RN  Anticoagulation Clinic  9/13/2022    _______________________________________________________________________     Anticoagulation Episode Summary     Current INR goal:  2.0-3.0   TTR:  82.5 % (10.9 mo)   Target end date:  Indefinite   Send INR reminders to:  [facility]    Indications    Thrombophilia (H) [D68.59]           Comments:  [employer] home meter, MBANNE-MARIE         Anticoagulation Care Providers     Provider Role Specialty Phone number    Dl Allen MD Referring  Family Medicine [phone]

## 2022-09-20 ENCOUNTER — DOCUMENTATION ONLY (OUTPATIENT)
Dept: ANTICOAGULATION | Facility: CLINIC | Age: 67
End: 2022-09-20

## 2022-09-20 DIAGNOSIS — D68.59 THROMBOPHILIA (H): Primary | ICD-10-CM

## 2022-09-20 LAB — INR (EXTERNAL): 2.8 (ref 0.9–1.1)

## 2022-09-20 NOTE — PROGRESS NOTES
ANTICOAGULATION  MANAGEMENT-Home Monitor Managed by Exception    Matt CROSS Marjoriecristine 67 year old male is on warfarin with therapeutic INR result. (Goal INR 2.0-3.0)    Recent labs: (last 7 days)     09/20/22  0822   INR 2.8*         Previous INR was Therapeutic    Medication, diet, health changes since last INR:chart reviewed; none identified    Contacted within the last 12 weeks by phone on 9/13/22      LISA Howellian was NOT contacted regarding therapeutic result today per home monitoring policy manage by exception agreement.   Current warfarin dose is to be continued:     Summary  As of 9/20/2022    Full warfarin instructions:  5 mg every day   Next INR check:  9/27/2022           ?   Ketty Myers RN  Anticoagulation Clinic  9/20/2022    _______________________________________________________________________     Anticoagulation Episode Summary     Current INR goal:  2.0-3.0   TTR:  82.9 % (11.1 mo)   Target end date:  Indefinite   Send INR reminders to:  ANTICOAG HOME MONITORING    Indications    Thrombophilia (H) [D68.59]           Comments:  Selena home meter, FATOUMATA         Anticoagulation Care Providers     Provider Role Specialty Phone number    Dl Allen MD Referring Family Medicine [phone]

## 2022-09-27 ENCOUNTER — DOCUMENTATION ONLY (OUTPATIENT)
Dept: ANTICOAGULATION | Facility: CLINIC | Age: 67
End: 2022-09-27

## 2022-09-27 DIAGNOSIS — D68.59 THROMBOPHILIA (H): Primary | ICD-10-CM

## 2022-09-27 LAB — INR (EXTERNAL): 2.5 (ref 0.9–1.1)

## 2022-09-27 NOTE — PROGRESS NOTES
ANTICOAGULATION  MANAGEMENT-Home Monitor Managed by Exception    Matt Rodriguez 67 year old male is on warfarin with therapeutic INR result. (Goal INR 2.0-3.0)    Recent labs: (last 7 days)     09/27/22  0822   INR 2.5*         Previous INR was Therapeutic    Medication, diet, health changes since last INR:chart reviewed; none identified    Contacted within the last 12 weeks by phone on 9/13/22      LISA Howellian was NOT contacted regarding therapeutic result today per home monitoring policy manage by exception agreement.   Current warfarin dose is to be continued:     Summary  As of 9/27/2022    Full warfarin instructions:  5 mg every day   Next INR check:  10/4/2022           ?   Zaida Nevarez RN  Anticoagulation Clinic  9/27/2022    _______________________________________________________________________     Anticoagulation Episode Summary     Current INR goal:  2.0-3.0   TTR:  83.2 % (11.3 mo)   Target end date:  Indefinite   Send INR reminders to:  ANTICOAG HOME MONITORING    Indications    Thrombophilia (H) [D68.59]           Comments:  Selena home meter, FATOUMATA         Anticoagulation Care Providers     Provider Role Specialty Phone number    Dl Allen MD Referring Family Medicine [phone]

## 2022-09-30 DIAGNOSIS — D68.59 THROMBOPHILIA (H): ICD-10-CM

## 2022-10-02 ENCOUNTER — HEALTH MAINTENANCE LETTER (OUTPATIENT)
Age: 67
End: 2022-10-02

## 2022-10-03 RX ORDER — WARFARIN SODIUM 5 MG/1
TABLET ORAL
Qty: 90 TABLET | Refills: 1 | Status: SHIPPED | OUTPATIENT
Start: 2022-10-03 | End: 2023-02-28

## 2022-10-03 NOTE — TELEPHONE ENCOUNTER
ANTICOAGULATION MANAGEMENT:  Medication Refill    Anticoagulation Summary  As of 9/27/2022    Warfarin maintenance plan:  5 mg (5 mg x 1) every day   Next INR check:  10/4/2022   Target end date:  Indefinite    Indications    Thrombophilia (H) [D68.59]             Anticoagulation Care Providers     Provider Role Specialty Phone number    Dl Allen MD Referring Family Medicine [phone]          Visit with referring provider/group within last year: Yes    [facility] referral signed within last year: Yes    Matt meets all criteria for refill (current [facility] referral, office visit with referring provider/group in last year, lab monitoring up to date or not exceeding 2 weeks overdue). Rx instructions and quantity supplied updated to match patient's current dosing plan. Warfarin 90 day supply with 1 refill granted per [facility] protocol     Yvan Hansen RN  Anticoagulation Clinic

## 2022-10-04 ENCOUNTER — TRANSFERRED RECORDS (OUTPATIENT)
Dept: HEALTH INFORMATION MANAGEMENT | Facility: CLINIC | Age: 67
End: 2022-10-04

## 2022-10-04 ENCOUNTER — DOCUMENTATION ONLY (OUTPATIENT)
Dept: ANTICOAGULATION | Facility: CLINIC | Age: 67
End: 2022-10-04

## 2022-10-04 DIAGNOSIS — D68.59 THROMBOPHILIA (H): Primary | ICD-10-CM

## 2022-10-04 LAB — INR (EXTERNAL): 2.3 (ref 0.9–1.1)

## 2022-10-04 NOTE — PROGRESS NOTES
ANTICOAGULATION  MANAGEMENT-Home Monitor Managed by Exception    Matt CROSS Marjoriecristine 67 year old male is on warfarin with therapeutic INR result. (Goal INR 2.0-3.0)    Recent labs: (last 7 days)     10/04/22  0822   INR 2.3*         Previous INR was Therapeutic    Medication, diet, health changes since last INR:chart reviewed; none identified    Contacted within the last 12 weeks by phone on 9/13/22      LISA Howellian was NOT contacted regarding therapeutic result today per home monitoring policy manage by exception agreement.   Current warfarin dose is to be continued:     Summary  As of 10/4/2022    Full warfarin instructions:  5 mg every day   Next INR check:  10/11/2022           ?   Ketty Myers RN  Anticoagulation Clinic  10/4/2022    _______________________________________________________________________     Anticoagulation Episode Summary     Current INR goal:  2.0-3.0   TTR:  83.5 % (11.6 mo)   Target end date:  Indefinite   Send INR reminders to:  ANTICOAG HOME MONITORING    Indications    Thrombophilia (H) [D68.59]           Comments:  Selena home meter, FATOUMATA         Anticoagulation Care Providers     Provider Role Specialty Phone number    Dl Allen MD Referring Family Medicine [phone]

## 2022-10-06 DIAGNOSIS — D68.59 THROMBOPHILIA (H): Primary | ICD-10-CM

## 2022-10-06 DIAGNOSIS — Z79.01 LONG TERM (CURRENT) USE OF ANTICOAGULANTS: ICD-10-CM

## 2022-10-11 ENCOUNTER — TRANSFERRED RECORDS (OUTPATIENT)
Dept: HEALTH INFORMATION MANAGEMENT | Facility: CLINIC | Age: 67
End: 2022-10-11

## 2022-10-11 ENCOUNTER — ANTICOAGULATION THERAPY VISIT (OUTPATIENT)
Dept: ANTICOAGULATION | Facility: CLINIC | Age: 67
End: 2022-10-11

## 2022-10-11 ENCOUNTER — DOCUMENTATION ONLY (OUTPATIENT)
Dept: FAMILY MEDICINE | Facility: CLINIC | Age: 67
End: 2022-10-11

## 2022-10-11 DIAGNOSIS — D68.59 THROMBOPHILIA (H): Primary | ICD-10-CM

## 2022-10-11 DIAGNOSIS — Z79.01 LONG TERM (CURRENT) USE OF ANTICOAGULANTS: ICD-10-CM

## 2022-10-11 LAB — INR (EXTERNAL): 1.5 (ref 0.9–1.1)

## 2022-10-11 NOTE — PROGRESS NOTES
ANTICOAGULATION MANAGEMENT     Matt Rodriguez 67 year old male is on warfarin with subtherapeutic INR result. (Goal INR 2.0-3.0)    Recent labs: (last 7 days)     10/11/22  0000   INR 1.5*       ASSESSMENT       Source(s): Chart Review    Previous INR was Therapeutic last 2(+) visits    Medication, diet, health changes since last INR chart reviewed; none identified           PLAN     Unable to reach Matt kay 2 today.  Mail box is full, unable to leave a message    No instructions provided. Unable to leave voicemail.  Will send a my chart message.    Follow up required to discuss out of range result     Tania WADSWORTH RN  Anticoagulation Clinic  10/11/2022

## 2022-10-11 NOTE — PROGRESS NOTES
ANTICOAGULATION CLINIC REFERRAL RENEWAL REQUEST       An annual renewal order is required for all patients referred to [facility].?  Please review and sign the pended referral order for Matt Rodriguez.       ANTICOAGULATION SUMMARY      Warfarin indication(s)   Thrombophilia    Mechanical heart valve present?  NO       Current goal range   INR: 2.0-3.0     Goal appropriate for indication? Goal INR 2-3, standard for indication(s) above     Time in Therapeutic Range (TTR)  (Goal > 60%) 84%       Office visit with referring provider's group within last year yes on 10/12/2021       Tania Liriano, RN  [facility]

## 2022-10-12 NOTE — PROGRESS NOTES
ANTICOAGULATION MANAGEMENT     Mattalf Amadorcristine 67 year old male is on warfarin with subtherapeutic INR result. (Goal INR 2.0-3.0)    Recent labs: (last 7 days)     10/11/22  0000   INR 1.5*       ASSESSMENT       Source(s): Chart Review and Patient/Caregiver Call       Warfarin doses taken: Missed dose(s) may be affecting INRmissed Sunday's dose but took it right away Monday morning.  Unsure if he missed Saturday's dose.    Diet: Increased greens/vitamin K in diet; plans to resume previous intake    New illness, injury, or hospitalization: No    Medication/supplement changes: None noted    Signs or symptoms of bleeding or clotting: No    Previous INR: Therapeutic last 2(+) visits    Additional findings: None     Patient did not read my chart message for boost last night and voice mail was full.  Will have patient take a booster dose today.       PLAN     Recommended plan for temporary change(s) affecting INR     Dosing Instructions: booster dose then continue your current warfarin dose with next INR in 6 days       Summary  As of 10/11/2022    Full warfarin instructions:  10/12: 7.5 mg; Otherwise 5 mg every day   Next INR check:  10/18/2022             Telephone call with Matt who verbalizes understanding and agrees to plan    Patient to recheck with home meter    Education provided: Importance of therapeutic range, Importance of following up at instructed interval and Importance of taking warfarin as instructed    Plan made per [facility] anticoagulation protocol    Tania Liriano, RN  Anticoagulation Clinic  10/12/2022    _______________________________________________________________________     Anticoagulation Episode Summary     Current INR goal:  2.0-3.0   TTR:  82.6 % (11.8 mo)   Target end date:  Indefinite   Send INR reminders to:  ANTICOAG HOME MONITORING    Indications    Thrombophilia (H) [D68.59]  Long term (current) use of anticoagulants [Z79.01]           Comments:  Selena home meter, FATOUMATA          Anticoagulation Care Providers     Provider Role Specialty Phone number    Dl Allen MD Referring Family Medicine [phone]

## 2022-10-17 ENCOUNTER — OFFICE VISIT (OUTPATIENT)
Dept: FAMILY MEDICINE | Facility: CLINIC | Age: 67
End: 2022-10-17
Payer: COMMERCIAL

## 2022-10-17 ENCOUNTER — MEDICAL CORRESPONDENCE (OUTPATIENT)
Dept: HEALTH INFORMATION MANAGEMENT | Facility: CLINIC | Age: 67
End: 2022-10-17

## 2022-10-17 VITALS
WEIGHT: 174.44 LBS | BODY MASS INDEX: 23.63 KG/M2 | SYSTOLIC BLOOD PRESSURE: 122 MMHG | DIASTOLIC BLOOD PRESSURE: 76 MMHG | HEIGHT: 72 IN | HEART RATE: 60 BPM | RESPIRATION RATE: 16 BRPM | TEMPERATURE: 97.9 F

## 2022-10-17 DIAGNOSIS — Z12.5 SCREENING FOR PROSTATE CANCER: ICD-10-CM

## 2022-10-17 DIAGNOSIS — Z00.00 ENCOUNTER FOR MEDICARE ANNUAL WELLNESS EXAM: Primary | ICD-10-CM

## 2022-10-17 DIAGNOSIS — D68.59 THROMBOPHILIA (H): ICD-10-CM

## 2022-10-17 DIAGNOSIS — Z79.01 LONG TERM (CURRENT) USE OF ANTICOAGULANTS: ICD-10-CM

## 2022-10-17 DIAGNOSIS — Z13.1 SCREENING FOR DIABETES MELLITUS: ICD-10-CM

## 2022-10-17 DIAGNOSIS — Z86.711 HISTORY OF PULMONARY EMBOLISM: ICD-10-CM

## 2022-10-17 DIAGNOSIS — Z13.220 SCREENING FOR LIPID DISORDERS: ICD-10-CM

## 2022-10-17 LAB
ALT SERPL W P-5'-P-CCNC: 20 U/L (ref 10–50)
ANION GAP SERPL CALCULATED.3IONS-SCNC: 7 MMOL/L (ref 7–15)
BUN SERPL-MCNC: 21.7 MG/DL (ref 8–23)
CALCIUM SERPL-MCNC: 9.3 MG/DL (ref 8.8–10.2)
CHLORIDE SERPL-SCNC: 105 MMOL/L (ref 98–107)
CHOLEST SERPL-MCNC: 209 MG/DL
CREAT SERPL-MCNC: 0.88 MG/DL (ref 0.67–1.17)
DEPRECATED HCO3 PLAS-SCNC: 28 MMOL/L (ref 22–29)
GFR SERPL CREATININE-BSD FRML MDRD: >90 ML/MIN/1.73M2
GLUCOSE SERPL-MCNC: 86 MG/DL (ref 70–99)
HDLC SERPL-MCNC: 61 MG/DL
LDLC SERPL CALC-MCNC: 133 MG/DL
NONHDLC SERPL-MCNC: 148 MG/DL
POTASSIUM SERPL-SCNC: 4.2 MMOL/L (ref 3.4–5.3)
PSA SERPL-MCNC: 1.82 NG/ML (ref 0–4.5)
SODIUM SERPL-SCNC: 140 MMOL/L (ref 136–145)
TRIGL SERPL-MCNC: 75 MG/DL

## 2022-10-17 PROCEDURE — 36415 COLL VENOUS BLD VENIPUNCTURE: CPT | Performed by: FAMILY MEDICINE

## 2022-10-17 PROCEDURE — G0103 PSA SCREENING: HCPCS | Performed by: FAMILY MEDICINE

## 2022-10-17 PROCEDURE — 84460 ALANINE AMINO (ALT) (SGPT): CPT | Performed by: FAMILY MEDICINE

## 2022-10-17 PROCEDURE — 80061 LIPID PANEL: CPT | Performed by: FAMILY MEDICINE

## 2022-10-17 PROCEDURE — 80048 BASIC METABOLIC PNL TOTAL CA: CPT | Performed by: FAMILY MEDICINE

## 2022-10-17 PROCEDURE — 90662 IIV NO PRSV INCREASED AG IM: CPT | Performed by: FAMILY MEDICINE

## 2022-10-17 PROCEDURE — 99397 PER PM REEVAL EST PAT 65+ YR: CPT | Mod: 25 | Performed by: FAMILY MEDICINE

## 2022-10-17 PROCEDURE — 90471 IMMUNIZATION ADMIN: CPT | Performed by: FAMILY MEDICINE

## 2022-10-17 ASSESSMENT — ENCOUNTER SYMPTOMS
CHILLS: 0
CONSTIPATION: 0
JOINT SWELLING: 0
HEADACHES: 0
HEMATOCHEZIA: 0
EYE PAIN: 0
DIARRHEA: 0
DYSURIA: 0
SORE THROAT: 0
MYALGIAS: 0
PARESTHESIAS: 0
HEARTBURN: 0
DIZZINESS: 0
FEVER: 0
ARTHRALGIAS: 1
FREQUENCY: 0
SHORTNESS OF BREATH: 0
HEMATURIA: 0
NERVOUS/ANXIOUS: 1
COUGH: 0
NAUSEA: 0
ABDOMINAL PAIN: 0
WEAKNESS: 0
PALPITATIONS: 0

## 2022-10-17 ASSESSMENT — ACTIVITIES OF DAILY LIVING (ADL): CURRENT_FUNCTION: NO ASSISTANCE NEEDED

## 2022-10-17 NOTE — PROGRESS NOTES
"SUBJECTIVE:   Matt is a 67 year old who presents for Preventive Visit.    Chief Complaint   Patient presents with     Wellness Visit     Pt. Fasting.     Patient has been advised of split billing requirements and indicates understanding: Yes  Are you in the first 12 months of your Medicare coverage?  No    Desires influenza vaccination today.    Feet: concern from last year.  He had noticed an instability.  He can feel it when his INR is over 3.  No recent swelling.  He also notices a blurriness of vision when his INR is elevated.      Neck pain: history of \"popping neck\" and locking up. Exercises:    Testosterone?  He has noticed that he does not notice the flow go through his penis.  No incontinence.  No overflow.    - sexual function is intact but his erections are soft. \"Not like 5 years ago.\"      HPI  Do you feel safe in your environment? Yes    Have you ever done Advance Care Planning? (For example, a Health Directive, POLST, or a discussion with a medical provider or your loved ones about your wishes): No, advance care planning information given to patient to review.  Advanced care planning was discussed at today's visit.       Fall risk  Fallen 2 or more times in the past year?: No  Any fall with injury in the past year?: No    Cognitive Screening   1) Repeat 3 items (Leader, Season, Table)  PASS  2) Clock draw: NORMAL  3) 3 item recall: Recalls 3 objects  Results: 3 items recalled: COGNITIVE IMPAIRMENT LESS LIKELY    Mini-CogTM Copyright S Ramon. Licensed by the author for use in [facility]; reprinted with permission (johnnie@.[facility]). All rights reserved.      Do you have sleep apnea, excessive snoring or daytime drowsiness?: no    Reviewed and updated as needed this visit by clinical staff   Tobacco  Allergies  Meds              Reviewed and updated as needed this visit by Provider                 Social History     Tobacco Use     Smoking status: Never     Smokeless tobacco: Never "   Substance Use Topics     Alcohol use: Yes     Comment: 2 drinks per week     Alcohol Use 10/17/2022   Prescreen: >3 drinks/day or >7 drinks/week? No     Current providers sharing in care for this patient include:   Patient Care Team:  Dl Allen MD as PCP - General (Family Medicine)  Dl Allen MD as Assigned PCP  Conner Raygoza MD as Assigned Cancer Care Provider    The following health maintenance items are reviewed in Epic and correct as of today:  Health Maintenance   Topic Date Due     HEPATITIS B IMMUNIZATION (1 of 3 - 3-dose series) Never done     DTAP/TDAP/TD IMMUNIZATION (1 - Tdap) Never done     Pneumococcal Vaccine: 65+ Years (1 - PCV) Never done     INFLUENZA VACCINE (1) 09/01/2022     MEDICARE ANNUAL WELLNESS VISIT  10/17/2023     ANNUAL REVIEW OF HM ORDERS  10/17/2023     FALL RISK ASSESSMENT  10/17/2023     LIPID  10/12/2026     COLORECTAL CANCER SCREENING  11/30/2026     ADVANCE CARE PLANNING  10/17/2027     PHQ-2 (once per calendar year)  Completed     ZOSTER IMMUNIZATION  Completed     COVID-19 Vaccine  Completed     IPV IMMUNIZATION  Aged Out     MENINGITIS IMMUNIZATION  Aged Out     HEPATITIS C SCREENING  Discontinued     AORTIC ANEURYSM SCREENING (SYSTEM ASSIGNED)  Discontinued     Labs reviewed in EPIC  Pneumonia Vaccine: He believes he has had it.  We will request records from his clinic in [place].    Review of Systems   Constitutional: Negative for chills and fever.   HENT: Negative for congestion, ear pain, hearing loss and sore throat.    Eyes: Negative for pain and visual disturbance.   Respiratory: Negative for cough and shortness of breath.    Cardiovascular: Negative for chest pain, palpitations and peripheral edema.   Gastrointestinal: Negative for abdominal pain, constipation, diarrhea, heartburn, hematochezia and nausea.   Genitourinary: Positive for impotence. Negative for dysuria, frequency, genital sores, hematuria, penile discharge and urgency.  "  Musculoskeletal: Positive for arthralgias. Negative for joint swelling and myalgias.   Skin: Negative for rash.   Neurological: Negative for dizziness, weakness, headaches and paresthesias.   Psychiatric/Behavioral: Negative for mood changes. The patient is nervous/anxious.        OBJECTIVE:   /76 (BP Location: Left arm, Patient Position: Sitting, Cuff Size: Adult Regular)   Pulse 60   Temp 97.9  F (36.6  C) (Oral)   Resp 16   Ht 1.816 m (5' 11.5\")   Wt 79.1 kg (174 lb 7 oz)   BMI 23.99 kg/m   Estimated body mass index is 23.99 kg/m  as calculated from the following:    Height as of this encounter: 1.816 m (5' 11.5\").    Weight as of this encounter: 79.1 kg (174 lb 7 oz).  Physical Exam  Vitals reviewed.   Constitutional:       General: He is not in acute distress.     Appearance: Normal appearance. He is not ill-appearing.   HENT:      Head: Normocephalic and atraumatic.      Right Ear: External ear normal.      Left Ear: External ear normal.      Nose: Nose normal.      Mouth/Throat:      Pharynx: Oropharynx is clear. No oropharyngeal exudate or posterior oropharyngeal erythema.   Eyes:      General: No scleral icterus.        Right eye: No discharge.         Left eye: No discharge.      Extraocular Movements: Extraocular movements intact.      Conjunctiva/sclera: Conjunctivae normal.      Pupils: Pupils are equal, round, and reactive to light.   Neck:      Comments: No thyromegaly.  Cardiovascular:      Rate and Rhythm: Normal rate and regular rhythm.      Heart sounds: Normal heart sounds. No murmur heard.    No friction rub. No gallop.   Pulmonary:      Effort: Pulmonary effort is normal. No respiratory distress.      Breath sounds: Normal breath sounds. No wheezing or rales.   Abdominal:      General: There is no distension.      Palpations: Abdomen is soft. There is no mass.      Tenderness: There is no abdominal tenderness.   Musculoskeletal:         General: No signs of injury. Normal range of " motion.      Cervical back: Normal range of motion.      Right lower leg: No edema.      Left lower leg: No edema.   Lymphadenopathy:      Cervical: No cervical adenopathy.   Skin:     General: Skin is warm.      Coloration: Skin is not jaundiced.      Findings: No rash.   Neurological:      General: No focal deficit present.      Mental Status: He is alert and oriented to person, place, and time.      Cranial Nerves: No cranial nerve deficit.      Deep Tendon Reflexes: Reflexes normal.   Psychiatric:         Mood and Affect: Mood normal.       The 10-year ASCVD risk score (Ranulfo FELDER, et al., 2019) is: 11.7%    Values used to calculate the score:      Age: 67 years      Sex: Male      Is Non- : No      Diabetic: No      Tobacco smoker: No      Systolic Blood Pressure: 122 mmHg      Is BP treated: No      HDL Cholesterol: 61 mg/dL      Total Cholesterol: 210 mg/dL      Diagnostic Test Results:  Labs reviewed in Epic    ASSESSMENT / PLAN:     Problem List Items Addressed This Visit     Encounter for Medicare annual wellness exam - Primary     Annual exam.  Couple of secondary concerns but otherwise he states that he has been active with no overall changes in health.  Continues in the INR program and is generally within target range.  History of neck pain we discussed some basic exercises that he could do.  He describes a loss of sensation when urinating.  I believe he is actually describing decreased urinary flow.  We have been screening him for prostate cancer with PSA testing annually.  We will continue this practice.  Colonoscopy was completed in 2021 without abnormalities.  Repeat in 5 years.  If urinary symptoms evolve/worsen, will plan for referral to urology.         Relevant Orders    Basic metabolic panel  (Ca, Cl, CO2, Creat, Gluc, K, Na, BUN)    ALT    PSA, screen    History of pulmonary embolism    Long term (current) use of anticoagulants    Thrombophilia (H)   Other Visit  "Diagnoses     Screening for lipid disorders        Relevant Orders    Basic metabolic panel  (Ca, Cl, CO2, Creat, Gluc, K, Na, BUN)    ALT    Lipid panel reflex to direct LDL Fasting    Screening for diabetes mellitus        Relevant Orders    Basic metabolic panel  (Ca, Cl, CO2, Creat, Gluc, K, Na, BUN)    ALT    Screening for prostate cancer        Relevant Orders    PSA, screen          Patient has been advised of split billing requirements and indicates understanding: Yes    COUNSELING:  Reviewed preventive health counseling, as reflected in patient instructions       Regular exercise       Healthy diet/nutrition    Estimated body mass index is 23.99 kg/m  as calculated from the following:    Height as of this encounter: 1.816 m (5' 11.5\").    Weight as of this encounter: 79.1 kg (174 lb 7 oz).    He reports that he has never smoked. He has never used smokeless tobacco.    Appropriate preventive services were discussed with this patient, including applicable screening as appropriate for cardiovascular disease, diabetes, osteopenia/osteoporosis, and glaucoma.  As appropriate for age/gender, discussed screening for colorectal cancer, prostate cancer, breast cancer, and cervical cancer. Checklist reviewing preventive services available has been given to the patient.    Reviewed patients plan of care and provided an AVS. The Basic Care Plan (routine screening as documented in Health Maintenance) for Matt meets the Care Plan requirement. This Care Plan has been established and reviewed with the Patient.    Counseling Resources:  ATP IV Guidelines  Pooled Cohorts Equation Calculator  Breast Cancer Risk Calculator  Breast Cancer: Medication to Reduce Risk  FRAX Risk Assessment  ICSI Preventive Guidelines  Dietary Guidelines for Americans, 2010  USDA's MyPlate  ASA Prophylaxis  Lung CA Screening    Dl Allen MD  [facility]    Identified Health Risks:  "

## 2022-10-17 NOTE — ASSESSMENT & PLAN NOTE
Annual exam.  Couple of secondary concerns but otherwise he states that he has been active with no overall changes in health.  Continues in the INR program and is generally within target range.  History of neck pain we discussed some basic exercises that he could do.  He describes a loss of sensation when urinating.  I believe he is actually describing decreased urinary flow.  We have been screening him for prostate cancer with PSA testing annually.  We will continue this practice.  Colonoscopy was completed in 2021 without abnormalities.  Repeat in 5 years.  If urinary symptoms evolve/worsen, will plan for referral to urology.

## 2022-10-17 NOTE — PATIENT INSTRUCTIONS
Patient Education   Personalized Prevention Plan  You are due for the preventive services outlined below.  Your care team is available to assist you in scheduling these services.  If you have already completed any of these items, please share that information with your care team to update in your medical record.  Health Maintenance Due   Topic Date Due     Hepatitis B Vaccine (1 of 3 - 3-dose series) Never done     Diptheria Tetanus Pertussis (DTAP/TDAP/TD) Vaccine (1 - Tdap) Never done     Pneumococcal Vaccine (1 - PCV) Never done     Flu Vaccine (1) 09/01/2022     ANNUAL REVIEW OF HM ORDERS  10/12/2022

## 2022-10-18 ENCOUNTER — TRANSFERRED RECORDS (OUTPATIENT)
Dept: HEALTH INFORMATION MANAGEMENT | Facility: CLINIC | Age: 67
End: 2022-10-18

## 2022-10-18 ENCOUNTER — ANTICOAGULATION THERAPY VISIT (OUTPATIENT)
Dept: ANTICOAGULATION | Facility: CLINIC | Age: 67
End: 2022-10-18

## 2022-10-18 DIAGNOSIS — D68.59 THROMBOPHILIA (H): Primary | ICD-10-CM

## 2022-10-18 DIAGNOSIS — Z79.01 LONG TERM (CURRENT) USE OF ANTICOAGULANTS: ICD-10-CM

## 2022-10-18 LAB — INR (EXTERNAL): 1.7 (ref 0.9–1.1)

## 2022-10-18 NOTE — PROGRESS NOTES
ANTICOAGULATION MANAGEMENT     Matt Rodriguez 67 year old male is on warfarin with subtherapeutic INR result. (Goal INR 2.0-3.0)    Recent labs: (last 7 days)     10/18/22  0000   INR 1.7*       ASSESSMENT       Source(s): Chart Review    Previous INR was Subtherapeutic    Medication, diet, health changes since last INR chart reviewed; none identified           PLAN     Unable to reach Matt today.    Left message to take a booster dose of warfarin,  7.5 mg tonight. Request call back for assessment.    Follow up required to confirm warfarin dose taken and assess for changes    Carmita Quiles, RN  Anticoagulation Clinic  10/18/2022

## 2022-10-19 ENCOUNTER — TELEPHONE (OUTPATIENT)
Dept: FAMILY MEDICINE | Facility: CLINIC | Age: 67
End: 2022-10-19

## 2022-10-19 NOTE — PROGRESS NOTES
ANTICOAGULATION MANAGEMENT     Matt Rodriguez 67 year old male is on warfarin with subtherapeutic INR result. (Goal INR 2.0-3.0)    Recent labs: (last 7 days)     10/18/22  0000   INR 1.7*       ASSESSMENT       Source(s): Chart Review and Patient/Caregiver Call       Warfarin doses taken: Warfarin taken as instructed    Diet: No new diet changes identified    New illness, injury, or hospitalization: No    Medication/supplement changes: None noted    Signs or symptoms of bleeding or clotting: No    Previous INR: Subtherapeutic    Additional findings: None       PLAN     Recommended plan for no diet, medication or health factor changes affecting INR     Dosing Instructions: Increase your warfarin dose (7.1% change) with next INR in 1 week       Summary  As of 10/18/2022    Full warfarin instructions:  7.5 mg every Wed; 5 mg all other days   Next INR check:  10/25/2022             Telephone call with Matt who verbalizes understanding and agrees to plan    Patient to recheck with home meter    Education provided: None required    Plan made per [facility] anticoagulation protocol    Carmita Quiles, RN  Anticoagulation Clinic  10/19/2022    _______________________________________________________________________     Anticoagulation Episode Summary     Current INR goal:  2.0-3.0   TTR:  81.0 % (1 y)   Target end date:  Indefinite   Send INR reminders to:  ANTICOAG HOME MONITORING    Indications    Thrombophilia (H) [D68.59]  Long term (current) use of anticoagulants [Z79.01]           Comments:  Selena home meter, MBE         Anticoagulation Care Providers     Provider Role Specialty Phone number    Dl Allen MD Referring Family Medicine [phone]

## 2022-10-19 NOTE — TELEPHONE ENCOUNTER
Patient Returning Call    Reason for call:  Returning a call to INR nurse     Information relayed to patient:  Will receive a call back    Patient has additional questions:  Yes    What are your questions/concerns:  dosing     [facility] or would you prefer to receive a phone call?:   Patient would prefer a phone call   Okay to leave a detailed message?: Yes at Home number on file [phone] (home)

## 2022-10-19 NOTE — TELEPHONE ENCOUNTER
Please see the October 18, 2022 Anticoagulation encounter for further information.    Carmita Quiles RN    [facility]ticoagulation [facility]

## 2022-10-25 ENCOUNTER — ANTICOAGULATION THERAPY VISIT (OUTPATIENT)
Dept: ANTICOAGULATION | Facility: CLINIC | Age: 67
End: 2022-10-25

## 2022-10-25 ENCOUNTER — TRANSFERRED RECORDS (OUTPATIENT)
Dept: HEALTH INFORMATION MANAGEMENT | Facility: CLINIC | Age: 67
End: 2022-10-25

## 2022-10-25 DIAGNOSIS — D68.59 THROMBOPHILIA (H): Primary | ICD-10-CM

## 2022-10-25 DIAGNOSIS — Z79.01 LONG TERM (CURRENT) USE OF ANTICOAGULANTS: ICD-10-CM

## 2022-10-25 LAB — INR (EXTERNAL): 1.9 (ref 0.9–1.1)

## 2022-10-25 NOTE — PROGRESS NOTES
ANTICOAGULATION MANAGEMENT     Matt Rodriguez 67 year old male is on warfarin with subtherapeutic INR result. (Goal INR 2.0-3.0)    Recent labs: (last 7 days)     10/25/22  0000   INR 1.9*       ASSESSMENT       Source(s): Chart Review and Patient/Caregiver Call       Warfarin doses taken: Warfarin taken as instructed    Diet: Increased greens/vitamin K in diet; ongoing change    New illness, injury, or hospitalization: No    Medication/supplement changes: None noted    Signs or symptoms of bleeding or clotting: No    Previous INR: Subtherapeutic    Additional findings: None       PLAN     Recommended plan for ongoing change(s) affecting INR     Dosing Instructions: Increase your warfarin dose (6.7% change) with next INR in 1 week       Summary  As of 10/25/2022    Full warfarin instructions:  7.5 mg every Tue, Fri; 5 mg all other days; Starting 10/25/2022   Next INR check:  11/1/2022             Telephone call with Matt who verbalizes understanding and agrees to plan    Patient to recheck with home meter    Education provided:     Dietary considerations: importance of consistent vitamin K intake    Plan made per [facility] anticoagulation protocol    Carmita Quiles, RN  Anticoagulation Clinic  10/25/2022    _______________________________________________________________________     Anticoagulation Episode Summary     Current INR goal:  2.0-3.0   TTR:  79.4 % (1 y)   Target end date:  Indefinite   Send INR reminders to:  [facility]IT[facility]    Indications    Thrombophilia (H) [D68.59]  Long term (current) use of anticoagulants [Z79.01]           Comments:  [employer] home meter, MBE         Anticoagulation Care Providers     Provider Role Specialty Phone number    Dl Allen MD Referring Family Medicine [phone]

## 2022-11-01 ENCOUNTER — TRANSFERRED RECORDS (OUTPATIENT)
Dept: HEALTH INFORMATION MANAGEMENT | Facility: CLINIC | Age: 67
End: 2022-11-01

## 2022-11-01 ENCOUNTER — ANTICOAGULATION THERAPY VISIT (OUTPATIENT)
Dept: ANTICOAGULATION | Facility: CLINIC | Age: 67
End: 2022-11-01

## 2022-11-01 DIAGNOSIS — D68.59 THROMBOPHILIA (H): Primary | ICD-10-CM

## 2022-11-01 DIAGNOSIS — Z79.01 LONG TERM (CURRENT) USE OF ANTICOAGULANTS: ICD-10-CM

## 2022-11-01 LAB — INR (EXTERNAL): 4.2 (ref 0.9–1.1)

## 2022-11-01 NOTE — PROGRESS NOTES
ANTICOAGULATION MANAGEMENT     Matt Rodriguez 67 year old male is on warfarin with supratherapeutic INR result. (Goal INR 2.0-3.0)    Recent labs: (last 7 days)     11/01/22  0000   INR 4.2*       ASSESSMENT       Source(s): Chart Review and Patient/Caregiver Call       Warfarin doses taken: Warfarin taken as instructed    Diet: Decreased greens/vitamin K in diet; ongoing change and Change in alcohol intake may be affecting INR. 2 beers last week     New illness, injury, or hospitalization: No    Medication/supplement changes: got update on flu and Covid before that over a week ago    Signs or symptoms of bleeding or clotting: No    Previous INR: Subtherapeutic    Additional findings: None       PLAN     Recommended plan for temporary change(s) affecting INR     Dosing Instructions: hold dose then decrease your warfarin dose (12.5% change) with next INR in 1 week       Summary  As of 11/1/2022    Full warfarin instructions:  11/1: Hold; Otherwise 5 mg every day; Starting 11/1/2022   Next INR check:  11/9/2022             Telephone call with Matt who verbalizes understanding and agrees to plan    Patient to recheck with home meter    Education provided:     Dietary considerations: importance of consistent vitamin K intake    Plan made per [facility] anticoagulation protocol    Carmita Quiles, RN  Anticoagulation Clinic  11/1/2022    _______________________________________________________________________     Anticoagulation Episode Summary     Current INR goal:  2.0-3.0   TTR:  78.3 % (1 y)   Target end date:  Indefinite   Send INR reminders to:  [facility]    Indications    Thrombophilia (H) [D68.59]  Long term (current) use of anticoagulants [Z79.01]           Comments:  [employer] home meter, MBE         Anticoagulation Care Providers     Provider Role Specialty Phone number    Dl Allen MD Referring Family Medicine [phone]

## 2022-11-09 ENCOUNTER — ANTICOAGULATION THERAPY VISIT (OUTPATIENT)
Dept: ANTICOAGULATION | Facility: CLINIC | Age: 67
End: 2022-11-09

## 2022-11-09 ENCOUNTER — TRANSFERRED RECORDS (OUTPATIENT)
Dept: HEALTH INFORMATION MANAGEMENT | Facility: CLINIC | Age: 67
End: 2022-11-09

## 2022-11-09 DIAGNOSIS — Z79.01 LONG TERM (CURRENT) USE OF ANTICOAGULANTS: ICD-10-CM

## 2022-11-09 DIAGNOSIS — D68.59 THROMBOPHILIA (H): Primary | ICD-10-CM

## 2022-11-09 LAB — INR (EXTERNAL): 2.2 (ref 0.9–1.1)

## 2022-11-09 NOTE — PROGRESS NOTES
ANTICOAGULATION MANAGEMENT     Matt Rodriguez 67 year old male is on warfarin with therapeutic INR result. (Goal INR 2.0-3.0)    Recent labs: (last 7 days)     11/09/22  0820   INR 2.2*       ASSESSMENT       Source(s): Chart Review and Patient/Caregiver Call       Warfarin doses taken: More warfarin taken than planned which may be affecting INR, patient did not hold 11/1 as directed, he increased greens instead    Diet: Increased greens/vitamin K in diet; plans to resume previous intake    New illness, injury, or hospitalization: No    Medication/supplement changes: None noted    Signs or symptoms of bleeding or clotting: No    Previous INR: Supratherapeutic    Additional findings: None       PLAN     Recommended plan for temporary change(s) affecting INR     Dosing Instructions: Continue your current warfarin dose with next INR in 1 week       Summary  As of 11/9/2022    Full warfarin instructions:  5 mg every day; Starting 11/9/2022   Next INR check:  11/16/2022             Telephone call with Matt who verbalizes understanding and agrees to plan    Patient to recheck with home meter    Education provided:     Please call back if any changes to your diet, medications or how you've been taking warfarin    Plan made per [facility] anticoagulation protocol    Jany Mast, RN  Anticoagulation Clinic  11/9/2022    _______________________________________________________________________     Anticoagulation Episode Summary     Current INR goal:  2.0-3.0   TTR:  77.0 % (1 y)   Target end date:  Indefinite   Send INR reminders to:  [facility]    Indications    Thrombophilia (H) [D68.59]  Long term (current) use of anticoagulants [Z79.01]           Comments:  [employer] home meter, MBE         Anticoagulation Care Providers     Provider Role Specialty Phone number    Dl Allen MD Referring Family Medicine [phone]

## 2022-11-15 ENCOUNTER — TELEPHONE (OUTPATIENT)
Dept: FAMILY MEDICINE | Facility: CLINIC | Age: 67
End: 2022-11-15

## 2022-11-15 NOTE — TELEPHONE ENCOUNTER
Reason for Call:  Patient is calling to find out terminology on what he has to discuss with a family member/please contact patient for next route of care    Detailed comments: please return call    Phone Number Patient can be reached at: Home number on file [phone] (home)    Best Time: any time    Can we leave a detailed message on this number? NO    Call taken on 11/15/2022 at 10:37 AM by Jasmyne Murray

## 2022-11-15 NOTE — TELEPHONE ENCOUNTER
Spoke with patient who called asking for diagnosis for anticoagulation. Discussed diagnosis of thrombophilia and how this can affect his blood and increases risk of clotting. Patient verbalized understanding and had no further questions.     Coty Carrion RN

## 2022-11-16 ENCOUNTER — TELEPHONE (OUTPATIENT)
Dept: FAMILY MEDICINE | Facility: CLINIC | Age: 67
End: 2022-11-16

## 2022-11-16 ENCOUNTER — TRANSFERRED RECORDS (OUTPATIENT)
Dept: HEALTH INFORMATION MANAGEMENT | Facility: CLINIC | Age: 67
End: 2022-11-16

## 2022-11-16 ENCOUNTER — ANTICOAGULATION THERAPY VISIT (OUTPATIENT)
Dept: ANTICOAGULATION | Facility: CLINIC | Age: 67
End: 2022-11-16

## 2022-11-16 DIAGNOSIS — Z79.01 LONG TERM (CURRENT) USE OF ANTICOAGULANTS: ICD-10-CM

## 2022-11-16 DIAGNOSIS — D68.59 THROMBOPHILIA (H): Primary | ICD-10-CM

## 2022-11-16 LAB — INR (EXTERNAL): 1.9 (ref 0.9–1.1)

## 2022-11-16 NOTE — PROGRESS NOTES
Incoming fax from [employer] home monitor [employer]    Date of result 11/16/22    INR result  1.9    ANTICOAGULATION MANAGEMENT     Matt Rodriguez 67 year old male is on warfarin with subtherapeutic INR result. (Goal INR 2.0-3.0)    Recent labs: (last 7 days)     11/16/22  0000   INR 1.9*       ASSESSMENT       Source(s): Chart Review and Patient/Caregiver Call       Warfarin doses taken: Warfarin taken as instructed    Diet: Patient had 3 beers over previous week and decreased greens intake, patient reports greens intake will continue inconsistent, he may have at least 2 servings over the next week. Beer intake inconsistent. Drinks only socially.    New illness, injury, or hospitalization: No    Medication/supplement changes: None noted    Signs or symptoms of bleeding or clotting: No    Previous INR: Therapeutic last visit; previously outside of goal range    Additional findings: None       PLAN     Recommended plan for temporary change(s) affecting INR     Dosing Instructions: 11/16: booster dose, then continue current maintenance dose with next INR in 1 week.     Summary  As of 11/16/2022    Full warfarin instructions:  11/16: 7.5 mg; Otherwise 5 mg every day; Starting 11/16/2022   Next INR check:  11/23/2022             Telephone call with Matt who agrees to plan and repeated back plan correctly    Patient to recheck with home meter    Education provided:     Please call back if any changes to your diet, medications or how you've been taking warfarin    Goal range and lab monitoring: goal range and significance of current result    Dietary considerations: importance of consistent vitamin K intake, impact of vitamin K foods on INR, vitamin K content of foods and effect of alcohol on INR    Plan made per [facility] anticoagulation protocol    Martha Trujillo RN  Anticoagulation Clinic  11/16/2022    _______________________________________________________________________     Anticoagulation Episode Summary     Current INR  goal:  2.0-3.0   TTR:  76.2 % (1 y)   Target end date:  Indefinite   Send INR reminders to:  [facility]IT[facility]    Indications    Thrombophilia (H) [D68.59]  Long term (current) use of anticoagulants [Z79.01]           Comments:  [employer] home meter, FATOUM[place]         Anticoagulation Care Providers     Provider Role Specialty Phone number    Dl Allen MD Referring Family Medicine [phone]

## 2022-11-16 NOTE — TELEPHONE ENCOUNTER
Reason for Call:  Other returning call    Detailed comments: Pt returning call from [facility] nurse Martha - did not leave return number - pt would like a call back    Phone Number Patient can be reached at: Home number on file [phone] (home)    Best Time: anytime    Can we leave a detailed message on this number? NO    Call taken on 11/16/2022 at 12:21 PM by Tracy Snyder

## 2022-11-23 ENCOUNTER — ANTICOAGULATION THERAPY VISIT (OUTPATIENT)
Dept: ANTICOAGULATION | Facility: CLINIC | Age: 67
End: 2022-11-23

## 2022-11-23 ENCOUNTER — TRANSFERRED RECORDS (OUTPATIENT)
Dept: HEALTH INFORMATION MANAGEMENT | Facility: CLINIC | Age: 67
End: 2022-11-23

## 2022-11-23 DIAGNOSIS — Z79.01 LONG TERM (CURRENT) USE OF ANTICOAGULANTS: ICD-10-CM

## 2022-11-23 DIAGNOSIS — D68.59 THROMBOPHILIA (H): Primary | ICD-10-CM

## 2022-11-23 LAB — INR (EXTERNAL): 2.6 (ref 0.9–1.1)

## 2022-11-23 NOTE — PROGRESS NOTES
ANTICOAGULATION MANAGEMENT     Matt Rodriguez 67 year old male is on warfarin with therapeutic INR result. (Goal INR 2.0-3.0)    Recent labs: (last 7 days)     11/23/22  0817   INR 2.6*       ASSESSMENT       Source(s): Chart Review and Patient/Caregiver Call       Warfarin doses taken: Warfarin taken as instructed    Diet: No new diet changes identified    New illness, injury, or hospitalization: No    Medication/supplement changes: None noted    Signs or symptoms of bleeding or clotting: No    Previous INR: Subtherapeutic    Additional findings: None       PLAN     Recommended plan for no diet, medication or health factor changes affecting INR     Dosing Instructions: Continue your current warfarin dose with next INR in 1 week       Summary  As of 11/23/2022    Full warfarin instructions:  5 mg every day; Starting 11/23/2022   Next INR check:  11/30/2022             Telephone call with Matt who verbalizes understanding and agrees to plan    Patient to recheck with home meter    Education provided:     Please call back if any changes to your diet, medications or how you've been taking warfarin    Plan made per [facility] anticoagulation protocol    Jany Mast RN  Anticoagulation Clinic  11/23/2022    _______________________________________________________________________     Anticoagulation Episode Summary     Current INR goal:  2.0-3.0   TTR:  75.9 % (1 y)   Target end date:  Indefinite   Send INR reminders to:  [facility]IT[facility]    Indications    Thrombophilia (H) [D68.59]  Long term (current) use of anticoagulants [Z79.01]           Comments:  [employer] home meter, MBE         Anticoagulation Care Providers     Provider Role Specialty Phone number    Dl Allen MD Referring Family Medicine [phone]

## 2022-11-30 ENCOUNTER — TRANSFERRED RECORDS (OUTPATIENT)
Dept: HEALTH INFORMATION MANAGEMENT | Facility: CLINIC | Age: 67
End: 2022-11-30

## 2022-11-30 ENCOUNTER — DOCUMENTATION ONLY (OUTPATIENT)
Dept: ANTICOAGULATION | Facility: CLINIC | Age: 67
End: 2022-11-30

## 2022-11-30 DIAGNOSIS — D68.59 THROMBOPHILIA (H): Primary | ICD-10-CM

## 2022-11-30 DIAGNOSIS — Z79.01 LONG TERM (CURRENT) USE OF ANTICOAGULANTS: ICD-10-CM

## 2022-11-30 LAB — INR (EXTERNAL): 2.1 (ref 0.9–1.1)

## 2022-11-30 NOTE — PROGRESS NOTES
ANTICOAGULATION  MANAGEMENT-Home Monitor Managed by Exception    Matt Rodriguez 67 year old male is on warfarin with therapeutic INR result. (Goal INR 2.0-3.0)    Recent labs: (last 7 days)     11/30/22  0940   INR 2.1*         Previous INR was Therapeutic    Medication, diet, health changes since last INR:chart reviewed; none identified    Contacted within the last 12 weeks by phone on 11/23/22      LISA Gutierrez was NOT contacted regarding therapeutic result today per home monitoring policy manage by exception agreement.   Current warfarin dose is to be continued:     Summary  As of 11/30/2022    Full warfarin instructions:  5 mg every day; Starting 11/30/2022   Next INR check:  12/7/2022           ?   Virginia Benitez RN  Anticoagulation Clinic  11/30/2022    _______________________________________________________________________     Anticoagulation Episode Summary     Current INR goal:  2.0-3.0   TTR:  75.9 % (1 y)   Target end date:  Indefinite   Send INR reminders to:  [facility] MONIT[facility]    Indications    Thrombophilia (H) [D68.59]  Long term (current) use of anticoagulants [Z79.01]           Comments:  [employer] home meter, MBANNE-MARIE         Anticoagulation Care Providers     Provider Role Specialty Phone number    Dl Allen MD Referring Family Medicine [phone]

## 2022-12-07 ENCOUNTER — TRANSFERRED RECORDS (OUTPATIENT)
Dept: HEALTH INFORMATION MANAGEMENT | Facility: CLINIC | Age: 67
End: 2022-12-07

## 2022-12-07 ENCOUNTER — DOCUMENTATION ONLY (OUTPATIENT)
Dept: ANTICOAGULATION | Facility: CLINIC | Age: 67
End: 2022-12-07

## 2022-12-07 DIAGNOSIS — Z79.01 LONG TERM (CURRENT) USE OF ANTICOAGULANTS: ICD-10-CM

## 2022-12-07 DIAGNOSIS — D68.59 THROMBOPHILIA (H): Primary | ICD-10-CM

## 2022-12-07 LAB — INR (EXTERNAL): 2.8 (ref 0.9–1.1)

## 2022-12-07 NOTE — PROGRESS NOTES
ANTICOAGULATION  MANAGEMENT-Home Monitor Managed by Exception    Matt Rodriguez 67 year old male is on warfarin with therapeutic INR result. (Goal INR 2.0-3.0)    Recent labs: (last 7 days)     12/07/22  0806   INR 2.8*         Previous INR was Therapeutic    Medication, diet, health changes since last INR:chart reviewed; none identified    Contacted within the last 12 weeks by phone on 11/23/22      LISA Howellian was NOT contacted regarding therapeutic result today per home monitoring policy manage by exception agreement.   Current warfarin dose is to be continued:     Summary  As of 12/7/2022    Full warfarin instructions:  5 mg every day; Starting 12/7/2022   Next INR check:  12/14/2022           ?   Jany Mast RN  Anticoagulation Clinic  12/7/2022    _______________________________________________________________________     Anticoagulation Episode Summary     Current INR goal:  2.0-3.0   TTR:  75.9 % (1 y)   Target end date:  Indefinite   Send INR reminders to:  [facility] HOME MONITORING    Indications    Thrombophilia (H) [D68.59]  Long term (current) use of anticoagulants [Z79.01]           Comments:  [employer] home meter, MBANNE-MARIE         Anticoagulation Care Providers     Provider Role Specialty Phone number    Dl Allen MD Referring Family Medicine [phone]

## 2022-12-14 ENCOUNTER — TRANSFERRED RECORDS (OUTPATIENT)
Dept: HEALTH INFORMATION MANAGEMENT | Facility: CLINIC | Age: 67
End: 2022-12-14

## 2022-12-14 ENCOUNTER — DOCUMENTATION ONLY (OUTPATIENT)
Dept: ANTICOAGULATION | Facility: CLINIC | Age: 67
End: 2022-12-14

## 2022-12-14 DIAGNOSIS — D68.59 THROMBOPHILIA (H): Primary | ICD-10-CM

## 2022-12-14 DIAGNOSIS — Z79.01 LONG TERM (CURRENT) USE OF ANTICOAGULANTS: ICD-10-CM

## 2022-12-14 LAB — INR (EXTERNAL): 2.7 (ref 0.9–1.1)

## 2022-12-14 NOTE — PROGRESS NOTES
ANTICOAGULATION  MANAGEMENT-Home Monitor Managed by Exception    Matt CROSS Marjoriecristine 67 year old male is on warfarin with therapeutic INR result. (Goal INR 2.0-3.0)    Recent labs: (last 7 days)     12/14/22  0000   INR 2.7*         Previous INR was Therapeutic    Medication, diet, health changes since last INR:chart reviewed; none identified    Contacted within the last 12 weeks by phone on 11/23/2022      LISA Howellian was NOT contacted regarding therapeutic result today per home monitoring policy manage by exception agreement.   Current warfarin dose is to be continued:     Summary  As of 12/14/2022    Full warfarin instructions:  5 mg every day; Starting 12/14/2022   Next INR check:  12/21/2022           ?   Tania Liriano RN  Anticoagulation Clinic  12/14/2022    _______________________________________________________________________     Anticoagulation Episode Summary     Current INR goal:  2.0-3.0   TTR:  75.9 % (1 y)   Target end date:  Indefinite   Send INR reminders to:  [facility]AG HOME MONITORING    Indications    Thrombophilia (H) [D68.59]  Long term (current) use of anticoagulants [Z79.01]           Comments:  [employer] home meter, MBE         Anticoagulation Care Providers     Provider Role Specialty Phone number    Dl Allen MD Referring Family Medicine [phone]

## 2022-12-19 ENCOUNTER — OFFICE VISIT (OUTPATIENT)
Dept: HEMATOLOGY | Facility: CLINIC | Age: 67
End: 2022-12-19
Attending: INTERNAL MEDICINE
Payer: COMMERCIAL

## 2022-12-19 VITALS
TEMPERATURE: 97.5 F | WEIGHT: 174 LBS | BODY MASS INDEX: 23.93 KG/M2 | SYSTOLIC BLOOD PRESSURE: 122 MMHG | HEART RATE: 84 BPM | DIASTOLIC BLOOD PRESSURE: 82 MMHG | OXYGEN SATURATION: 97 %

## 2022-12-19 DIAGNOSIS — Z79.01 CURRENT USE OF LONG TERM ANTICOAGULATION: ICD-10-CM

## 2022-12-19 DIAGNOSIS — Z86.711 HISTORY OF PULMONARY EMBOLISM: Primary | ICD-10-CM

## 2022-12-19 PROCEDURE — 99213 OFFICE O/P EST LOW 20 MIN: CPT | Performed by: INTERNAL MEDICINE

## 2022-12-19 PROCEDURE — G0463 HOSPITAL OUTPT CLINIC VISIT: HCPCS

## 2022-12-19 PROCEDURE — G0463 HOSPITAL OUTPT CLINIC VISIT: HCPCS | Performed by: INTERNAL MEDICINE

## 2022-12-19 NOTE — PROGRESS NOTES
[facility]  [facility]  [facility], [address], [place] [address]  Main: [phone], Fax: [phone]          Outpatient Clinic Visit  Date:  12/19/2022      Matt Rodriguez is a 66 yo male with a history of unprovoked pulmonary embolism, here today to revisit his long-term anticoagulation plan.  We last saw him in November 2021.    Background history:  He has a history of unprovoked pulmonary embolism in November 2018. He was anticoagulated with rivaroxaban. However at some point, he changed to warfarin, apparently due to increased nuisance bleeding on rivaroxaban (primarily in the form of bright red blood per rectum related to hemorrhoids). He also had expressed concern about the lack of an antidote for rivaroxaban (although that is no longer the case). He has done well on warfarin without any problematic nuisance bleeding and without any more serious bleeding.    There is a family history of venous thrombosis. His mom had a DVT in the postpartum period. He has a brother who had what sounds like an unprovoked DVT. He has another brother with a clot in his leg, circumstances unclear.    Previous thrombophilia testing showed that he does not have factor V Leiden or the prothrombin gene mutation. He also does not have protein C, protein S, or antithrombin deficiency. There appears to have been some question of whether or not he may have antiphospholipid antibody syndrome. He had a positive lupus anticoagulant intermittently in the past, but the positive values appear to be due to the fact that he was tested while on rivaroxaban.  When testing was done off rivaroxaban in June 2019, a lupus inhibitor was not detected.   He also had negative cardiolipin antibody titers.      He has no other significant past medical history and his only medication is warfarin.    Interval history:  Since we last saw him about a year ago, Matt has remained well.  He continues  to tolerate warfarin without any difficulty.  He has occasional hemorrhoidal bleeding but no other bleeding symptoms.  He continues to use a home INR monitor.    Physical exam:   He appears to be in good overall health. More detailed exam not performed today.    Labs:  INR data over the last year were reviewed.  He continues to test approximately weekly.  Over the last 6 months, his INR has ranged from 1.5 to 4.2, but he is in the therapeutic range of 2 to 3 more than 75% of the time.      ASSESSMENT / PLAN:  1. Unprovoked pulmonary embolism, November 2018  2. Long-term anticoagulation, currently on warfarin  3. Family history of venous thrombosis, with no identified genetic thrombophilia    Matt is a 67-year-old male with a history of unprovoked pulmonary embolism in November 2018.  He is otherwise in good health and has had no significant bleeding issues.  Thus, we continue to recommend long term anticoagulation.  He is doing well on warfarin with good INR control.    We will plan to see him back annually to revisit his long-term anticoagulation plan, sooner with new issues or concerns.      Total time 20 minutes, including review of medical records and labs, clinic visit, and documentation.      Conner Raygoza MD  Professor of Medicine  Division of Hematology, Oncology, and Transplantation  Director, [facility]

## 2022-12-21 ENCOUNTER — TRANSFERRED RECORDS (OUTPATIENT)
Dept: HEALTH INFORMATION MANAGEMENT | Facility: CLINIC | Age: 67
End: 2022-12-21

## 2022-12-21 ENCOUNTER — DOCUMENTATION ONLY (OUTPATIENT)
Dept: ANTICOAGULATION | Facility: CLINIC | Age: 67
End: 2022-12-21

## 2022-12-21 DIAGNOSIS — Z79.01 LONG TERM (CURRENT) USE OF ANTICOAGULANTS: ICD-10-CM

## 2022-12-21 DIAGNOSIS — D68.59 THROMBOPHILIA (H): Primary | ICD-10-CM

## 2022-12-21 LAB — INR (EXTERNAL): 2.6 (ref 0.9–1.1)

## 2022-12-21 NOTE — PROGRESS NOTES
ANTICOAGULATION  MANAGEMENT-Home Monitor Managed by Exception    Matt Rodriguez 67 year old male is on warfarin with therapeutic INR result. (Goal INR 2.0-3.0)    Recent labs: (last 7 days)     12/21/22  0931   INR 2.6*         Previous INR was Therapeutic    Medication, diet, health changes since last INR:chart reviewed; none identified    Contacted within the last 12 weeks by phone on 11/23/22      LISA Gutierrez was NOT contacted regarding therapeutic result today per home monitoring policy manage by exception agreement.   Current warfarin dose is to be continued:     Summary  As of 12/21/2022    Full warfarin instructions:  5 mg every day   Next INR check:  12/28/2022           ?   Virginia Benitez RN  Anticoagulation Clinic  12/21/2022    _______________________________________________________________________     Anticoagulation Episode Summary     Current INR goal:  2.0-3.0   TTR:  75.9 % (1 y)   Target end date:  Indefinite   Send INR reminders to:  [facility]AG HOME MONITORING    Indications    Thrombophilia (H) [D68.59]  Long term (current) use of anticoagulants [Z79.01]           Comments:  [employer] home meter, MBE         Anticoagulation Care Providers     Provider Role Specialty Phone number    Dl Allen MD Referring Family Medicine [phone]

## 2022-12-28 ENCOUNTER — DOCUMENTATION ONLY (OUTPATIENT)
Dept: ANTICOAGULATION | Facility: CLINIC | Age: 67
End: 2022-12-28

## 2022-12-28 ENCOUNTER — TRANSFERRED RECORDS (OUTPATIENT)
Dept: HEALTH INFORMATION MANAGEMENT | Facility: CLINIC | Age: 67
End: 2022-12-28

## 2022-12-28 LAB — INR (EXTERNAL): 3 (ref 0.9–1.1)

## 2022-12-28 NOTE — PROGRESS NOTES
Incoming fax from [employer] home monitor [employer]    Date of result 12/28/22    INR result 3.0      ANTICOAGULATION  MANAGEMENT-Home Monitor Managed by Exception    Matt CROSS Marjoriecristine 67 year old male is on warfarin with therapeutic INR result. (Goal INR 2.0-3.0)    Recent labs: (last 7 days)     12/28/22  0000   INR 3.0*         Previous INR was Therapeutic    Medication, diet, health changes since last INR:chart reviewed; none identified    Contacted within the last 12 weeks by phone on 11/23/22      LISA Gutierrez was NOT contacted regarding therapeutic result today per home monitoring policy manage by exception agreement.   Current warfarin dose is to be continued:     Summary  As of 12/28/2022    Full warfarin instructions:  5 mg every day   Next INR check:  1/4/2023           ?   Martha Trujillo RN  Anticoagulation Clinic  12/28/2022    _______________________________________________________________________     Anticoagulation Episode Summary     Current INR goal:  2.0-3.0   TTR:  75.9 % (1 y)   Target end date:  Indefinite   Send INR reminders to:  [facility]IT[facility]    Indications    Thrombophilia (H) [D68.59]  Long term (current) use of anticoagulants [Z79.01]           Comments:  [employer] home meter, MBANNE-MARIE         Anticoagulation Care Providers     Provider Role Specialty Phone number    Dl Allen MD Referring Family Medicine [phone]

## 2023-01-04 ENCOUNTER — TRANSFERRED RECORDS (OUTPATIENT)
Dept: HEALTH INFORMATION MANAGEMENT | Facility: CLINIC | Age: 68
End: 2023-01-04

## 2023-01-04 ENCOUNTER — DOCUMENTATION ONLY (OUTPATIENT)
Dept: ANTICOAGULATION | Facility: CLINIC | Age: 68
End: 2023-01-04

## 2023-01-04 DIAGNOSIS — D68.59 THROMBOPHILIA (H): Primary | ICD-10-CM

## 2023-01-04 DIAGNOSIS — Z79.01 LONG TERM (CURRENT) USE OF ANTICOAGULANTS: ICD-10-CM

## 2023-01-04 LAB — INR (EXTERNAL): 2.9 (ref 0.9–1.1)

## 2023-01-04 NOTE — PROGRESS NOTES
ANTICOAGULATION  MANAGEMENT-Home Monitor Managed by Exception    Matt Rodriugez 67 year old male is on warfarin with therapeutic INR result. (Goal INR 2.0-3.0)    Recent labs: (last 7 days)     01/04/23  0815   INR 2.9*         Previous INR was Therapeutic    Medication, diet, health changes since last INR:chart reviewed; none identified    Contacted within the last 12 weeks by phone on  11/23/22      LISA Howellian was NOT contacted regarding therapeutic result today per home monitoring policy manage by exception agreement.   Current warfarin dose is to be continued:     Summary  As of 1/4/2023    Full warfarin instructions:  5 mg every day   Next INR check:  1/11/2023           ?   Jany Mast RN  Anticoagulation Clinic  1/4/2023    _______________________________________________________________________     Anticoagulation Episode Summary     Current INR goal:  2.0-3.0   TTR:  76.0 % (1 y)   Target end date:  Indefinite   Send INR reminders to:  ANTICOAG HOME MONITORING    Indications    Thrombophilia (H) [D68.59]  Long term (current) use of anticoagulants [Z79.01]           Comments:  [employer] home meter, MBE         Anticoagulation Care Providers     Provider Role Specialty Phone number    Dl Allen MD Referring Family Medicine [phone]

## 2023-01-11 ENCOUNTER — DOCUMENTATION ONLY (OUTPATIENT)
Dept: ANTICOAGULATION | Facility: CLINIC | Age: 68
End: 2023-01-11

## 2023-01-11 ENCOUNTER — TRANSFERRED RECORDS (OUTPATIENT)
Dept: HEALTH INFORMATION MANAGEMENT | Facility: CLINIC | Age: 68
End: 2023-01-11

## 2023-01-11 DIAGNOSIS — Z79.01 LONG TERM (CURRENT) USE OF ANTICOAGULANTS: ICD-10-CM

## 2023-01-11 DIAGNOSIS — D68.59 THROMBOPHILIA (H): Primary | ICD-10-CM

## 2023-01-11 LAB — INR (EXTERNAL): 2.8 (ref 0.9–1.1)

## 2023-01-11 NOTE — PROGRESS NOTES
ANTICOAGULATION  MANAGEMENT-Home Monitor Managed by Exception    Matt Rodriguez 67 year old male is on warfarin with therapeutic INR result. (Goal INR 2.0-3.0)    Recent labs: (last 7 days)     01/11/23  0000   INR 2.8*         Previous INR was Therapeutic    Medication, diet, health changes since last INR:chart reviewed; none identified    Contacted within the last 12 weeks by phone on 11/23/22      LISA Howellian was NOT contacted regarding therapeutic result today per home monitoring policy manage by exception agreement.   Current warfarin dose is to be continued:     Summary  As of 1/11/2023    Full warfarin instructions:  5 mg every day   Next INR check:  1/18/2023           ?   Tania Liriano RN  Anticoagulation Clinic  1/11/2023    _______________________________________________________________________     Anticoagulation Episode Summary     Current INR goal:  2.0-3.0   TTR:  75.9 % (1 y)   Target end date:  Indefinite   Send INR reminders to:  [facility]AG HOME MONITORING    Indications    Thrombophilia (H) [D68.59]  Long term (current) use of anticoagulants [Z79.01]           Comments:  [employer] home meter, MBE         Anticoagulation Care Providers     Provider Role Specialty Phone number    Dl Allen MD Referring Family Medicine [phone]

## 2023-01-18 ENCOUNTER — DOCUMENTATION ONLY (OUTPATIENT)
Dept: ANTICOAGULATION | Facility: CLINIC | Age: 68
End: 2023-01-18
Payer: COMMERCIAL

## 2023-01-18 ENCOUNTER — TRANSFERRED RECORDS (OUTPATIENT)
Dept: HEALTH INFORMATION MANAGEMENT | Facility: CLINIC | Age: 68
End: 2023-01-18
Payer: COMMERCIAL

## 2023-01-18 DIAGNOSIS — D68.59 THROMBOPHILIA (H): Primary | ICD-10-CM

## 2023-01-18 DIAGNOSIS — Z79.01 LONG TERM (CURRENT) USE OF ANTICOAGULANTS: ICD-10-CM

## 2023-01-18 LAB — INR (EXTERNAL): 2.4 (ref 0.9–1.1)

## 2023-01-18 NOTE — PROGRESS NOTES
ANTICOAGULATION  MANAGEMENT-Home Monitor Managed by Exception    Matt Rodriguez 67 year old male is on warfarin with therapeutic INR result. (Goal INR 2.0-3.0)    Recent labs: (last 7 days)     01/18/23  0845   INR 2.4*         Previous INR was Therapeutic    Medication, diet, health changes since last INR:chart reviewed; none identified    Contacted within the last 12 weeks by phone on 11/23/22      LISA Howlelian was NOT contacted regarding therapeutic result today per home monitoring policy manage by exception agreement.   Current warfarin dose is to be continued:     Summary  As of 1/18/2023    Full warfarin instructions:  5 mg every day   Next INR check:  1/25/2023           ?   Jany Mast RN  Anticoagulation Clinic  1/18/2023    _______________________________________________________________________     Anticoagulation Episode Summary     Current INR goal:  2.0-3.0   TTR:  75.9 % (1 y)   Target end date:  Indefinite   Send INR reminders to:  [facility]IT[facility]    Indications    Thrombophilia (H) [D68.59]  Long term (current) use of anticoagulants [Z79.01]           Comments:  [employer] home meter, MBE         Anticoagulation Care Providers     Provider Role Specialty Phone number    Dl Allen MD Referring Family Medicine [phone]

## 2023-01-25 ENCOUNTER — TRANSFERRED RECORDS (OUTPATIENT)
Dept: HEALTH INFORMATION MANAGEMENT | Facility: CLINIC | Age: 68
End: 2023-01-25
Payer: COMMERCIAL

## 2023-01-25 ENCOUNTER — DOCUMENTATION ONLY (OUTPATIENT)
Dept: ANTICOAGULATION | Facility: CLINIC | Age: 68
End: 2023-01-25
Payer: COMMERCIAL

## 2023-01-25 DIAGNOSIS — D68.59 THROMBOPHILIA (H): Primary | ICD-10-CM

## 2023-01-25 DIAGNOSIS — Z79.01 LONG TERM (CURRENT) USE OF ANTICOAGULANTS: ICD-10-CM

## 2023-01-25 LAB — INR (EXTERNAL): 2.6 (ref 0.9–1.1)

## 2023-01-25 NOTE — PROGRESS NOTES
ANTICOAGULATION  MANAGEMENT-Home Monitor Managed by Exception    Matt Rodriguez 67 year old male is on warfarin with therapeutic INR result. (Goal INR 2.0-3.0)    Recent labs: (last 7 days)     01/25/23  0843   INR 2.6*         Previous INR was Therapeutic    Medication, diet, health changes since last INR:chart reviewed; none identified    Contacted within the last 12 weeks by phone on 11/23/22      LISA Howellian was NOT contacted regarding therapeutic result today per home monitoring policy manage by exception agreement.   Current warfarin dose is to be continued:     Summary  As of 1/25/2023    Full warfarin instructions:  5 mg every day   Next INR check:  2/1/2023           ?   Jany Mast RN  Anticoagulation Clinic  1/25/2023    _______________________________________________________________________     Anticoagulation Episode Summary     Current INR goal:  2.0-3.0   TTR:  75.9 % (1 y)   Target end date:  Indefinite   Send INR reminders to:  [facility]IT[facility]    Indications    Thrombophilia (H) [D68.59]  Long term (current) use of anticoagulants [Z79.01]           Comments:  [employer] home meter, MBE         Anticoagulation Care Providers     Provider Role Specialty Phone number    Dl Allen MD Referring Family Medicine [phone]

## 2023-02-01 ENCOUNTER — TRANSFERRED RECORDS (OUTPATIENT)
Dept: HEALTH INFORMATION MANAGEMENT | Facility: CLINIC | Age: 68
End: 2023-02-01
Payer: COMMERCIAL

## 2023-02-01 ENCOUNTER — DOCUMENTATION ONLY (OUTPATIENT)
Dept: ANTICOAGULATION | Facility: CLINIC | Age: 68
End: 2023-02-01
Payer: COMMERCIAL

## 2023-02-01 DIAGNOSIS — Z79.01 LONG TERM (CURRENT) USE OF ANTICOAGULANTS: ICD-10-CM

## 2023-02-01 DIAGNOSIS — D68.59 THROMBOPHILIA (H): Primary | ICD-10-CM

## 2023-02-01 LAB — INR (EXTERNAL): 2.1 (ref 0.9–1.1)

## 2023-02-01 NOTE — PROGRESS NOTES
ANTICOAGULATION  MANAGEMENT-Home Monitor Managed by Exception    Matt Rodriguez 67 year old male is on warfarin with therapeutic INR result. (Goal INR 2.0-3.0)    Recent labs: (last 7 days)     02/01/23  0000   INR 2.1*         Previous INR was Therapeutic    Medication, diet, health changes since last INR:chart reviewed; none identified    Contacted within the last 12 weeks by phone on 11/23/22      LISA Howellian was NOT contacted regarding therapeutic result today per home monitoring policy manage by exception agreement.   Current warfarin dose is to be continued:     Summary  As of 2/1/2023    Full warfarin instructions:  5 mg every day   Next INR check:  2/8/2023           ?   Sincere Parson RN  Anticoagulation Clinic  2/1/2023    _______________________________________________________________________     Anticoagulation Episode Summary     Current INR goal:  2.0-3.0   TTR:  75.9 % (1 y)   Target end date:  Indefinite   Send INR reminders to:  [facility]IT[facility]    Indications    Thrombophilia (H) [D68.59]  Long term (current) use of anticoagulants [Z79.01]           Comments:  [employer] home meter, MBE         Anticoagulation Care Providers     Provider Role Specialty Phone number    Dl Allen MD Referring Family Medicine [phone]

## 2023-02-08 ENCOUNTER — DOCUMENTATION ONLY (OUTPATIENT)
Dept: ANTICOAGULATION | Facility: CLINIC | Age: 68
End: 2023-02-08
Payer: COMMERCIAL

## 2023-02-08 ENCOUNTER — TRANSFERRED RECORDS (OUTPATIENT)
Dept: HEALTH INFORMATION MANAGEMENT | Facility: CLINIC | Age: 68
End: 2023-02-08
Payer: COMMERCIAL

## 2023-02-08 DIAGNOSIS — Z79.01 LONG TERM (CURRENT) USE OF ANTICOAGULANTS: ICD-10-CM

## 2023-02-08 DIAGNOSIS — D68.59 THROMBOPHILIA (H): Primary | ICD-10-CM

## 2023-02-08 LAB — INR (EXTERNAL): 2.6 (ref 0.9–1.1)

## 2023-02-08 NOTE — PROGRESS NOTES
ANTICOAGULATION  MANAGEMENT-Home Monitor Managed by Exception    Matt Rodriguez 67 year old male is on warfarin with therapeutic INR result. (Goal INR 2.0-3.0)    Recent labs: (last 7 days)     02/08/23  1115   INR 2.6*         Previous INR was Therapeutic    Medication, diet, health changes since last INR:chart reviewed; none identified    Contacted within the last 12 weeks by phone on 11/23/2023. Due for 12 week call next week.      LISA Gutierrez was NOT contacted regarding therapeutic result today per home monitoring policy manage by exception agreement.   Current warfarin dose is to be continued:     Summary  As of 2/8/2023    Full warfarin instructions:  5 mg every day   Next INR check:  2/15/2023           ?   Yvan Hansen RN  Anticoagulation Clinic  2/8/2023    _______________________________________________________________________     Anticoagulation Episode Summary     Current INR goal:  2.0-3.0   TTR:  75.9 % (1 y)   Target end date:  Indefinite   Send INR reminders to:  [facility]    Indications    Thrombophilia (H) [D68.59]  Long term (current) use of anticoagulants [Z79.01]           Comments:  [employer] home meter, MBANNE-MARIE         Anticoagulation Care Providers     Provider Role Specialty Phone number    Dl Allen MD Referring Family Medicine [phone]

## 2023-02-15 ENCOUNTER — TRANSFERRED RECORDS (OUTPATIENT)
Dept: HEALTH INFORMATION MANAGEMENT | Facility: CLINIC | Age: 68
End: 2023-02-15
Payer: COMMERCIAL

## 2023-02-15 ENCOUNTER — ANTICOAGULATION THERAPY VISIT (OUTPATIENT)
Dept: ANTICOAGULATION | Facility: CLINIC | Age: 68
End: 2023-02-15
Payer: COMMERCIAL

## 2023-02-15 DIAGNOSIS — Z79.01 LONG TERM (CURRENT) USE OF ANTICOAGULANTS: ICD-10-CM

## 2023-02-15 DIAGNOSIS — D68.59 THROMBOPHILIA (H): Primary | ICD-10-CM

## 2023-02-15 LAB — INR (EXTERNAL): 2.3 (ref 0.9–1.1)

## 2023-02-15 NOTE — PROGRESS NOTES
ANTICOAGULATION MANAGEMENT     Matt Rodriguez 67 year old male is on warfarin with therapeutic INR result. (Goal INR 2.0-3.0)    Recent labs: (last 7 days)     02/15/23  1025   INR 2.3*       ASSESSMENT       Source(s): Chart Review and Patient/Caregiver Call       Warfarin doses taken: Warfarin taken as instructed    Diet: No new diet changes identified    New illness, injury, or hospitalization: No    Medication/supplement changes: None noted    Signs or symptoms of bleeding or clotting: No    Previous INR: Therapeutic last 2(+) visits    Additional findings: None       PLAN     Recommended plan for no diet, medication or health factor changes affecting INR     Dosing Instructions: Continue your current warfarin dose with next INR in 1 week       Summary  As of 2/15/2023    Full warfarin instructions:  5 mg every day   Next INR check:  2/22/2023             Telephone call with Matt who verbalizes understanding and agrees to plan    Patient to recheck with home meter    Education provided:     Please call back if any changes to your diet, medications or how you've been taking warfarin    Resume manage by exception with home monitor. Continue to submit INR results to home monitor company.You will only be called when your result is out of range. Please call and notify [facility] if new medication started, dose missed, signs or symptoms of bleeding or clotting, or a surgery/procedure is scheduled.    Plan made per [facility] anticoagulation protocol    Zaida Nevarez RN  Anticoagulation Clinic  2/15/2023    _______________________________________________________________________     Anticoagulation Episode Summary     Current INR goal:  2.0-3.0   TTR:  75.9 % (1 y)   Target end date:  Indefinite   Send INR reminders to:  [facility] HOME MONITORING    Indications    Thrombophilia (H) [D68.59]  Long term (current) use of anticoagulants [Z79.01]           Comments:  [employer] home meter, MBE         Anticoagulation Care Providers     Provider  Role Specialty Phone number    Dl Allen MD Referring Family Medicine [phone]

## 2023-02-23 ENCOUNTER — ANTICOAGULATION THERAPY VISIT (OUTPATIENT)
Dept: ANTICOAGULATION | Facility: CLINIC | Age: 68
End: 2023-02-23
Payer: COMMERCIAL

## 2023-02-23 ENCOUNTER — TRANSFERRED RECORDS (OUTPATIENT)
Dept: HEALTH INFORMATION MANAGEMENT | Facility: CLINIC | Age: 68
End: 2023-02-23
Payer: COMMERCIAL

## 2023-02-23 DIAGNOSIS — D68.59 THROMBOPHILIA (H): Primary | ICD-10-CM

## 2023-02-23 DIAGNOSIS — Z79.01 LONG TERM (CURRENT) USE OF ANTICOAGULANTS: ICD-10-CM

## 2023-02-23 LAB — INR (EXTERNAL): 3.4 (ref 0.9–1.1)

## 2023-02-23 NOTE — PROGRESS NOTES
Attempted a second call and the mailbox is full so unable to leave dosing for this evening. Also the patient has not read his [place] message that was sent to him.    Carmita Quiles RN    [facility] Anticoagulation Clinic

## 2023-02-23 NOTE — PROGRESS NOTES
Incoming fax from [employer] home monitor company    Date of result 02/23/2023    INR result 3.4    Carmita Quiles RN    [facility] Anticoagulation [facility]

## 2023-02-23 NOTE — PROGRESS NOTES
ANTICOAGULATION MANAGEMENT     aMtt Rodriguez 67 year old male is on warfarin with supratherapeutic INR result. (Goal INR 2.0-3.0)    Recent labs: (last 7 days)     02/23/23  0836   INR 3.4*       ASSESSMENT       Source(s): Chart Review and Patient/Caregiver Call       Warfarin doses taken: Warfarin taken as instructed    Diet: Decreased greens/vitamin K in diet; plans to resume previous intake, was traveling Monday and Tuesday so less greens.    New illness, injury, or hospitalization: No    Medication/supplement changes: None noted    Signs or symptoms of bleeding or clotting: No    Previous INR: Therapeutic last 2(+) visits    Additional findings: None         PLAN     Recommended plan for temporary change(s) affecting INR     Dosing Instructions: Continue your current warfarin dose with next INR in 1 week       Summary  As of 2/23/2023    Full warfarin instructions:  5 mg every day   Next INR check:  3/2/2023             Telephone call with Matt who verbalizes understanding and agrees to plan    Patient to recheck with home meter    Education provided:     Dietary considerations: importance of consistent vitamin K intake    Plan made per [facility] anticoagulation protocol    Carmita Quiles, RN  Anticoagulation Clinic  2/23/2023    _______________________________________________________________________     Anticoagulation Episode Summary     Current INR goal:  2.0-3.0   TTR:  75.1 % (1 y)   Target end date:  Indefinite   Send INR reminders to:  [facility] HOME MONITORING    Indications    Thrombophilia (H) [D68.59]  Long term (current) use of anticoagulants [Z79.01]           Comments:  [employer] home meter, MBE         Anticoagulation Care Providers     Provider Role Specialty Phone number    Dl Allen MD Referring Family Medicine [phone]

## 2023-02-27 DIAGNOSIS — D68.59 THROMBOPHILIA (H): ICD-10-CM

## 2023-02-28 RX ORDER — WARFARIN SODIUM 5 MG/1
TABLET ORAL
Qty: 90 TABLET | Refills: 1 | Status: SHIPPED | OUTPATIENT
Start: 2023-02-28 | End: 2023-04-05

## 2023-03-02 ENCOUNTER — ANTICOAGULATION THERAPY VISIT (OUTPATIENT)
Dept: ANTICOAGULATION | Facility: CLINIC | Age: 68
End: 2023-03-02
Payer: COMMERCIAL

## 2023-03-02 ENCOUNTER — TRANSFERRED RECORDS (OUTPATIENT)
Dept: HEALTH INFORMATION MANAGEMENT | Facility: CLINIC | Age: 68
End: 2023-03-02
Payer: COMMERCIAL

## 2023-03-02 DIAGNOSIS — D68.59 THROMBOPHILIA (H): Primary | ICD-10-CM

## 2023-03-02 DIAGNOSIS — Z79.01 LONG TERM (CURRENT) USE OF ANTICOAGULANTS: ICD-10-CM

## 2023-03-02 LAB — INR (EXTERNAL): 2.4 (ref 0.9–1.1)

## 2023-03-02 NOTE — PROGRESS NOTES
ANTICOAGULATION MANAGEMENT     Matt Rodriguez 67 year old male is on warfarin with therapeutic INR result. (Goal INR 2.0-3.0)    Recent labs: (last 7 days)     03/02/23  0503   INR 2.4*       ASSESSMENT       Source(s): Chart Review and Patient/Caregiver Call       Warfarin doses taken: Warfarin taken as instructed    Diet: Increased greens/vitamin K in diet; plans to resume previous intake -stated that he ate more greens this past week to correct previous INR.    New illness, injury, or hospitalization: No    Medication/supplement changes: None noted    Signs or symptoms of bleeding or clotting: No    Previous INR: Supratherapeutic most likely due to less greens while he was traveling    Additional findings: None         PLAN     Recommended plan for temporary change(s) affecting INR     Dosing Instructions: Continue your current warfarin dose with next INR in 1 week       Summary  As of 3/2/2023    Full warfarin instructions:  5 mg every day   Next INR check:  3/9/2023             Telephone call with Matt who verbalizes understanding and agrees to plan    Patient to recheck with home meter    Education provided:     Resume manage by exception with home monitor. Continue to submit INR results to home monitor company.You will only be called when your result is out of range. Please call and notify [facility] if new medication started, dose missed, signs or symptoms of bleeding or clotting, or a surgery/procedure is scheduled.    Contact [phone]  with any changes, questions or concerns.     Plan made per [facility] anticoagulation protocol    Zaida Gamez RN  Anticoagulation Clinic  3/2/2023    _______________________________________________________________________     Anticoagulation Episode Summary     Current INR goal:  2.0-3.0   TTR:  74.4 % (1 y)   Target end date:  Indefinite   Send INR reminders to:  [facility] HOME MONITORING    Indications    Thrombophilia (H) [D68.59]  Long term (current) use of anticoagulants  [Z79.01]           Comments:  [employer] home meter, MBE         Anticoagulation Care Providers     Provider Role Specialty Phone number    Dl Allen MD Referring Family Medicine [phone]

## 2023-03-08 ENCOUNTER — TRANSFERRED RECORDS (OUTPATIENT)
Dept: HEALTH INFORMATION MANAGEMENT | Facility: CLINIC | Age: 68
End: 2023-03-08
Payer: COMMERCIAL

## 2023-03-08 ENCOUNTER — DOCUMENTATION ONLY (OUTPATIENT)
Dept: ANTICOAGULATION | Facility: CLINIC | Age: 68
End: 2023-03-08
Payer: COMMERCIAL

## 2023-03-08 DIAGNOSIS — Z79.01 LONG TERM (CURRENT) USE OF ANTICOAGULANTS: ICD-10-CM

## 2023-03-08 DIAGNOSIS — D68.59 THROMBOPHILIA (H): Primary | ICD-10-CM

## 2023-03-08 LAB — INR (EXTERNAL): 2.3 (ref 0.9–1.1)

## 2023-03-08 NOTE — PROGRESS NOTES
ANTICOAGULATION  MANAGEMENT-Home Monitor Managed by Exception    Matt Rodriguez 67 year old male is on warfarin with therapeutic INR result. (Goal INR 2.0-3.0)    Recent labs: (last 7 days)     03/08/23  0820   INR 2.3*         Previous INR was Therapeutic    Medication, diet, health changes since last INR:chart reviewed; none identified    Contacted within the last 12 weeks by phone on 3/2/23      LISA Howellian was NOT contacted regarding therapeutic result today per home monitoring policy manage by exception agreement.   Current warfarin dose is to be continued:     Summary  As of 3/8/2023    Full warfarin instructions:  5 mg every day   Next INR check:  3/15/2023           ?   Jany Mast RN  Anticoagulation Clinic  3/8/2023    _______________________________________________________________________     Anticoagulation Episode Summary     Current INR goal:  2.0-3.0   TTR:  74.4 % (1 y)   Target end date:  Indefinite   Send INR reminders to:  [facility] HOME MONITORING    Indications    Thrombophilia (H) [D68.59]  Long term (current) use of anticoagulants [Z79.01]           Comments:  [employer] home meter, MBE         Anticoagulation Care Providers     Provider Role Specialty Phone number    Dl Allen MD Referring Family Medicine [phone]

## 2023-03-15 ENCOUNTER — DOCUMENTATION ONLY (OUTPATIENT)
Dept: ANTICOAGULATION | Facility: CLINIC | Age: 68
End: 2023-03-15
Payer: COMMERCIAL

## 2023-03-15 ENCOUNTER — TRANSFERRED RECORDS (OUTPATIENT)
Dept: HEALTH INFORMATION MANAGEMENT | Facility: CLINIC | Age: 68
End: 2023-03-15
Payer: COMMERCIAL

## 2023-03-15 DIAGNOSIS — D68.59 THROMBOPHILIA (H): Primary | ICD-10-CM

## 2023-03-15 DIAGNOSIS — Z79.01 LONG TERM (CURRENT) USE OF ANTICOAGULANTS: ICD-10-CM

## 2023-03-15 LAB — INR (EXTERNAL): 2.8 (ref 0.9–1.1)

## 2023-03-15 NOTE — PROGRESS NOTES
ANTICOAGULATION  MANAGEMENT-Home Monitor Managed by Exception    Matt Rodriguez 67 year old male is on warfarin with therapeutic INR result. (Goal INR 2.0-3.0)    Recent labs: (last 7 days)     03/15/23  0821   INR 2.8*         Previous INR was Therapeutic    Medication, diet, health changes since last INR:chart reviewed; none identified    Contacted within the last 12 weeks by phone on 3/2/23      LISA Gutierrez was NOT contacted regarding therapeutic result today per home monitoring policy manage by exception agreement.   Current warfarin dose is to be continued:     Summary  As of 3/15/2023    Full warfarin instructions:  5 mg every day   Next INR check:  3/22/2023           ?   Zaida Nevarez RN  Anticoagulation Clinic  3/15/2023    _______________________________________________________________________     Anticoagulation Episode Summary     Current INR goal:  2.0-3.0   TTR:  75.1 % (1 y)   Target end date:  Indefinite   Send INR reminders to:  [facility] HOME MONITORING    Indications    Thrombophilia (H) [D68.59]  Long term (current) use of anticoagulants [Z79.01]           Comments:  [employer] home meter, MBE         Anticoagulation Care Providers     Provider Role Specialty Phone number    Dl Allen MD Referring Family Medicine [phone]

## 2023-03-22 ENCOUNTER — TRANSFERRED RECORDS (OUTPATIENT)
Dept: HEALTH INFORMATION MANAGEMENT | Facility: CLINIC | Age: 68
End: 2023-03-22
Payer: COMMERCIAL

## 2023-03-22 ENCOUNTER — DOCUMENTATION ONLY (OUTPATIENT)
Dept: ANTICOAGULATION | Facility: CLINIC | Age: 68
End: 2023-03-22
Payer: COMMERCIAL

## 2023-03-22 DIAGNOSIS — D68.59 THROMBOPHILIA (H): Primary | ICD-10-CM

## 2023-03-22 DIAGNOSIS — Z79.01 LONG TERM (CURRENT) USE OF ANTICOAGULANTS: ICD-10-CM

## 2023-03-22 LAB — INR (EXTERNAL): 2.7 (ref 0.9–1.1)

## 2023-03-22 NOTE — PROGRESS NOTES
ANTICOAGULATION  MANAGEMENT-Home Monitor Managed by Exception    Matt Rodriguez 67 year old male is on warfarin with therapeutic INR result. (Goal INR 2.0-3.0)    Recent labs: (last 7 days)     03/22/23  0824   INR 2.7*         Previous INR was Therapeutic    Medication, diet, health changes since last INR:chart reviewed; none identified    Contacted within the last 12 weeks by phone on 3/2/23      LISA Gutierrez was NOT contacted regarding therapeutic result today per home monitoring policy manage by exception agreement.   Current warfarin dose is to be continued:     Summary  As of 3/22/2023    Full warfarin instructions:  5 mg every day   Next INR check:  3/29/2023           ?   Jany Mast RN  Anticoagulation Clinic  3/22/2023    _______________________________________________________________________     Anticoagulation Episode Summary     Current INR goal:  2.0-3.0   TTR:  77.0 % (1 y)   Target end date:  Indefinite   Send INR reminders to:  [facility] HOME MONITORING    Indications    Thrombophilia (H) [D68.59]  Long term (current) use of anticoagulants [Z79.01]           Comments:  [employer] home meter, MBE         Anticoagulation Care Providers     Provider Role Specialty Phone number    Dl Allen MD Referring Family Medicine [phone]

## 2023-03-29 ENCOUNTER — TRANSFERRED RECORDS (OUTPATIENT)
Dept: HEALTH INFORMATION MANAGEMENT | Facility: CLINIC | Age: 68
End: 2023-03-29
Payer: COMMERCIAL

## 2023-03-29 ENCOUNTER — ANTICOAGULATION THERAPY VISIT (OUTPATIENT)
Dept: ANTICOAGULATION | Facility: CLINIC | Age: 68
End: 2023-03-29
Payer: COMMERCIAL

## 2023-03-29 DIAGNOSIS — Z79.01 LONG TERM (CURRENT) USE OF ANTICOAGULANTS: ICD-10-CM

## 2023-03-29 DIAGNOSIS — D68.59 THROMBOPHILIA (H): Primary | ICD-10-CM

## 2023-03-29 LAB — INR (EXTERNAL): 3.9 (ref 0.9–1.1)

## 2023-03-29 NOTE — PROGRESS NOTES
ANTICOAGULATION MANAGEMENT     Matt Rodriguez 67 year old male is on warfarin with supratherapeutic INR result. (Goal INR 2.0-3.0)    Recent labs: (last 7 days)     03/29/23  1017   INR 3.9*       ASSESSMENT       Source(s): Chart Review and Patient/Caregiver Call       Warfarin doses taken: Warfarin taken as instructed    Diet: Decreased greens/vitamin K in diet; plans to resume previous intake. He has been traveling and not eating usual vegetables and salads. Plans to increase starting today with a spinach salad.    New illness, injury, or hospitalization: No    Medication/supplement changes: None noted    Signs or symptoms of bleeding or clotting: No    Previous INR: Therapeutic last 2(+) visits    Additional findings: clarified date of test was today 3/29/23, not 3/23/23 as recorded below         PLAN     Recommended plan for temporary change(s) affecting INR     Dosing Instructions: partial hold then continue your current warfarin dose with next INR in 1 week       Summary  As of 3/29/2023    Full warfarin instructions:  3/29: 2.5 mg; Otherwise 5 mg every day   Next INR check:  4/5/2023             Telephone call with Matt who verbalizes understanding and agrees to plan    Patient to recheck with home meter    Education provided:     Goal range and lab monitoring: goal range and significance of current result    Symptom monitoring: monitoring for bleeding signs and symptoms    Contact [phone]  with any changes, questions or concerns.     Plan made per [facility] anticoagulation protocol    Danii Abdul RN  Anticoagulation Clinic  3/29/2023    _______________________________________________________________________     Anticoagulation Episode Summary     Current INR goal:  2.0-3.0   TTR:  76.6 % (1 y)   Target end date:  Indefinite   Send INR reminders to:  [employer] [facility]    Indications    Thrombophilia (H) [D68.59]  Long term (current) use of anticoagulants [Z79.01]           Comments:  [employer] home  meter, MBE         Anticoagulation Care Providers     Provider Role Specialty Phone number    Dl Allen MD Referring Family Medicine [phone]

## 2023-04-05 ENCOUNTER — ANTICOAGULATION THERAPY VISIT (OUTPATIENT)
Dept: ANTICOAGULATION | Facility: CLINIC | Age: 68
End: 2023-04-05
Payer: COMMERCIAL

## 2023-04-05 ENCOUNTER — TRANSFERRED RECORDS (OUTPATIENT)
Dept: HEALTH INFORMATION MANAGEMENT | Facility: CLINIC | Age: 68
End: 2023-04-05
Payer: COMMERCIAL

## 2023-04-05 DIAGNOSIS — Z79.01 LONG TERM (CURRENT) USE OF ANTICOAGULANTS: ICD-10-CM

## 2023-04-05 DIAGNOSIS — D68.59 THROMBOPHILIA (H): Primary | ICD-10-CM

## 2023-04-05 DIAGNOSIS — D68.59 THROMBOPHILIA (H): ICD-10-CM

## 2023-04-05 LAB — INR (EXTERNAL): 3 (ref 0.9–1.1)

## 2023-04-05 RX ORDER — WARFARIN SODIUM 5 MG/1
TABLET ORAL
Qty: 10 TABLET | Refills: 0 | Status: SHIPPED | OUTPATIENT
Start: 2023-04-05 | End: 2023-12-07

## 2023-04-05 NOTE — PROGRESS NOTES
Incoming fax from [employer] home monitor company    Date of result  4/5/23 4:15 AM    INR result  3.0    ANTICOAGULATION MANAGEMENT     Matt Rodriguez 67 year old male is on warfarin with therapeutic INR result. (Goal INR 2.0-3.0)    Recent labs: (last 7 days)     04/05/23  0415   INR 3.0*       ASSESSMENT       Source(s): Chart Review and Patient/Caregiver Call       Warfarin doses taken: Warfarin taken as instructed    Diet: patient back on track with greens intake.    New illness, injury, or hospitalization: No    Medication/supplement changes: None noted    Signs or symptoms of bleeding or clotting: No    Previous INR: Supratherapeutic    Additional findings: None         PLAN     Recommended plan for no diet, medication or health factor changes affecting INR     Dosing Instructions: Continue your current warfarin dose with next INR in 1 week       Summary  As of 4/5/2023    Full warfarin instructions:  5 mg every day   Next INR check:  4/12/2023             Telephone call with Matt who agrees to plan and repeated back plan correctly    Patient to recheck with home meter    Education provided:     Please call back if any changes to your diet, medications or how you've been taking warfarin    Goal range and lab monitoring: goal range and significance of current result    Resume manage by exception with home monitor. Continue to submit INR results to home monitor company.You will only be called when your result is out of range. Please call and notify [facility] if new medication started, dose missed, signs or symptoms of bleeding or clotting, or a surgery/procedure is scheduled.    Plan made per [facility] anticoagulation protocol    Martha Trujillo RN  Anticoagulation Clinic  4/5/2023    _______________________________________________________________________     Anticoagulation Episode Summary     Current INR goal:  2.0-3.0   TTR:  75.1 % (1 y)   Target end date:  Indefinite   Send INR reminders to:  [facility]     Indications    Thrombophilia (H) [D68.59]  Long term (current) use of anticoagulants [Z79.01]           Comments:  [employer] home meter, MBE         Anticoagulation Care Providers     Provider Role Specialty Phone number    Dl Allen MD Referring Family Medicine [phone]

## 2023-04-05 NOTE — TELEPHONE ENCOUNTER
General Call      Reason for Call:  Short travel dispense     What are your questions or concerns:  Patient is currently out of town and is needing to stay an extra week longer then planned so he didn't have enough medication on hand to last him the whole time. He is requesting a short week long dispense to last him until he gets home. If fill is okay please state on RX that an early fill is okay.  Please advise.    Date of last appointment with provider: 10/17/22    Could we send this information to you in [employer] or would you prefer to receive a phone call?:   Patient would prefer a phone call     Okay to leave a detailed message?: Yes at Cell number on file:    Telephone Information:   Mobile [phone]

## 2023-04-12 ENCOUNTER — TRANSFERRED RECORDS (OUTPATIENT)
Dept: HEALTH INFORMATION MANAGEMENT | Facility: CLINIC | Age: 68
End: 2023-04-12
Payer: COMMERCIAL

## 2023-04-12 ENCOUNTER — DOCUMENTATION ONLY (OUTPATIENT)
Dept: ANTICOAGULATION | Facility: CLINIC | Age: 68
End: 2023-04-12
Payer: COMMERCIAL

## 2023-04-12 DIAGNOSIS — Z79.01 LONG TERM (CURRENT) USE OF ANTICOAGULANTS: ICD-10-CM

## 2023-04-12 DIAGNOSIS — D68.59 THROMBOPHILIA (H): Primary | ICD-10-CM

## 2023-04-12 LAB — INR (EXTERNAL): 2.6 (ref 0.9–1.1)

## 2023-04-12 NOTE — PROGRESS NOTES
ANTICOAGULATION  MANAGEMENT-Home Monitor Managed by Exception    Matt Rodriguez 67 year old male is on warfarin with therapeutic INR result. (Goal INR 2.0-3.0)    Recent labs: (last 7 days)     04/12/23  0000   INR 2.6*         Previous INR was Therapeutic    Medication, diet, health changes since last INR:chart reviewed; none identified    Contacted within the last 12 weeks by phone on 4/5/23        LISA Howellian was NOT contacted regarding therapeutic result today per home monitoring policy manage by exception agreement.   Current warfarin dose is to be continued:     Summary  As of 4/12/2023    Full warfarin instructions:  5 mg every day   Next INR check:  4/19/2023           ?   Jany Mast RN  Anticoagulation Clinic  4/12/2023    _______________________________________________________________________     Anticoagulation Episode Summary     Current INR goal:  2.0-3.0   TTR:  76.9 % (1 y)   Target end date:  Indefinite   Send INR reminders to:  [facility] HOME MONITORING    Indications    Thrombophilia (H) [D68.59]  Long term (current) use of anticoagulants [Z79.01]           Comments:  [employer] home meter, MBE         Anticoagulation Care Providers     Provider Role Specialty Phone number    Dl Allen MD Referring Family Medicine [phone]

## 2023-04-26 ENCOUNTER — TRANSFERRED RECORDS (OUTPATIENT)
Dept: HEALTH INFORMATION MANAGEMENT | Facility: CLINIC | Age: 68
End: 2023-04-26
Payer: COMMERCIAL

## 2023-04-26 ENCOUNTER — DOCUMENTATION ONLY (OUTPATIENT)
Dept: ANTICOAGULATION | Facility: CLINIC | Age: 68
End: 2023-04-26
Payer: COMMERCIAL

## 2023-04-26 DIAGNOSIS — D68.59 THROMBOPHILIA (H): Primary | ICD-10-CM

## 2023-04-26 DIAGNOSIS — Z79.01 LONG TERM (CURRENT) USE OF ANTICOAGULANTS: ICD-10-CM

## 2023-04-26 LAB — INR (EXTERNAL): 2.2 (ref 2–3)

## 2023-04-26 NOTE — PROGRESS NOTES
ANTICOAGULATION  MANAGEMENT-Home Monitor Managed by Exception    Matt Rodriguez 67 year old male is on warfarin with therapeutic INR result. (Goal INR 2.0-3.0)    Recent labs: (last 7 days)     04/26/23  0842   INR 2.2         Previous INR was Therapeutic    Medication, diet, health changes since last INR:chart reviewed; none identified    Contacted within the last 12 weeks by phone on 4/5/23      LISA Gutierrez was NOT contacted regarding therapeutic result today per home monitoring policy manage by exception agreement.   Current warfarin dose is to be continued:     Summary  As of 4/26/2023    Full warfarin instructions:  5 mg every day   Next INR check:  5/10/2023           ?   Marisa Reyna, RN  Anticoagulation Clinic  4/26/2023    _______________________________________________________________________     Anticoagulation Episode Summary     Current INR goal:  2.0-3.0   TTR:  77.6 % (1 y)   Target end date:  Indefinite   Send INR reminders to:  [facility]IT[facility]    Indications    Thrombophilia (H) [D68.59]  Long term (current) use of anticoagulants [Z79.01]           Comments:  [employer] home meter, MBE         Anticoagulation Care Providers     Provider Role Specialty Phone number    Dl Allen MD Referring Family Medicine [phone]

## 2023-05-09 ENCOUNTER — TRANSFERRED RECORDS (OUTPATIENT)
Dept: HEALTH INFORMATION MANAGEMENT | Facility: CLINIC | Age: 68
End: 2023-05-09
Payer: COMMERCIAL

## 2023-05-09 ENCOUNTER — ANTICOAGULATION THERAPY VISIT (OUTPATIENT)
Dept: ANTICOAGULATION | Facility: CLINIC | Age: 68
End: 2023-05-09
Payer: COMMERCIAL

## 2023-05-09 DIAGNOSIS — Z79.01 LONG TERM (CURRENT) USE OF ANTICOAGULANTS: ICD-10-CM

## 2023-05-09 DIAGNOSIS — D68.59 THROMBOPHILIA (H): Primary | ICD-10-CM

## 2023-05-09 LAB — INR (EXTERNAL): 3.2 (ref 0.9–1.1)

## 2023-05-09 NOTE — PROGRESS NOTES
ANTICOAGULATION MANAGEMENT     Matt Rodriguez 67 year old male is on warfarin with supratherapeutic INR result. (Goal INR 2.0-3.0)    Recent labs: (last 7 days)     05/09/23  1116   INR 3.2*       ASSESSMENT       Source(s): Chart Review and Patient/Caregiver Call       Warfarin doses taken: Warfarin taken as instructed    Diet: Decreased greens/vitamin K in diet; plans to resume previous intake. Patient reports he was traveling over the last week, so he was not eating his normal diet. Patient is home now, and will resume his baseline diet.    Medication/supplement changes: Patient reports he is currently on keflex through 5/10/23 (patient is not sure which date this medication was started) for a nail puncture. This can increase INR.    New illness, injury, or hospitalization: No    Signs or symptoms of bleeding or clotting: No    Previous result: Therapeutic last 2(+) visits    Additional findings: None         PLAN     Recommended plan for temporary change(s) affecting INR     Dosing Instructions: partial hold then continue your current warfarin dose with next INR in 1 week       Summary  As of 5/9/2023    Full warfarin instructions:  5/9: 2.5 mg; Otherwise 5 mg every day   Next INR check:  5/16/2023             Telephone call with Matt who verbalizes understanding and agrees to plan    Patient to recheck with home meter    Education provided:     Please call back if any changes to your diet, medications or how you've been taking warfarin    Symptom monitoring: monitoring for bleeding signs and symptoms and monitoring for clotting signs and symptoms    Contact [phone]  with any changes, questions or concerns.     Plan made per [facility] anticoagulation protocol    Zaida Nevarez RN  Anticoagulation Clinic  5/9/2023    _______________________________________________________________________     Anticoagulation Episode Summary     Current INR goal:  2.0-3.0   TTR:  76.9 % (1 y)   Target end date:  Indefinite   Send INR  reminders to:  [facility]    Indications    Thrombophilia (H) [D68.59]  Long term (current) use of anticoagulants [Z79.01]           Comments:  [employer] home meter, MBE         Anticoagulation Care Providers     Provider Role Specialty Phone number    Dl Allen MD Referring Family Medicine [phone]

## 2023-05-17 ENCOUNTER — TRANSFERRED RECORDS (OUTPATIENT)
Dept: HEALTH INFORMATION MANAGEMENT | Facility: CLINIC | Age: 68
End: 2023-05-17
Payer: COMMERCIAL

## 2023-05-17 ENCOUNTER — ANTICOAGULATION THERAPY VISIT (OUTPATIENT)
Dept: ANTICOAGULATION | Facility: CLINIC | Age: 68
End: 2023-05-17
Payer: COMMERCIAL

## 2023-05-17 DIAGNOSIS — D68.59 THROMBOPHILIA (H): Primary | ICD-10-CM

## 2023-05-17 DIAGNOSIS — Z79.01 LONG TERM (CURRENT) USE OF ANTICOAGULANTS: ICD-10-CM

## 2023-05-17 LAB — INR (EXTERNAL): 2.9 (ref 0.9–1.1)

## 2023-05-17 NOTE — PROGRESS NOTES
Incoming fax from [employer] home monitor [employer]    Date of result  5/17/23 6:51 AM    INR result  2.9    ANTICOAGULATION MANAGEMENT     Matt Rodriguez 67 year old male is on warfarin with therapeutic INR result. (Goal INR 2.0-3.0)    Recent labs: (last 7 days)     05/17/23  0651   INR 2.9*       ASSESSMENT       Source(s): Chart Review and Patient/Caregiver Call       Warfarin doses taken: Warfarin taken as instructed    Diet: No new diet changes identified    Medication/supplement changes: None noted    New illness, injury, or hospitalization: No    Signs or symptoms of bleeding or clotting: No    Previous result: Supratherapeutic    Additional findings: None         PLAN     Recommended plan for no diet, medication or health factor changes affecting INR     Dosing Instructions: Continue your current warfarin dose with next INR in 1 week       Summary  As of 5/17/2023    Full warfarin instructions:  5 mg every day   Next INR check:  5/24/2023             Telephone call with Matt who agrees to plan and repeated back plan correctly    Patient to recheck with home meter    Education provided:     Please call back if any changes to your diet, medications or how you've been taking warfarin    Goal range and lab monitoring: goal range and significance of current result    Plan made per [facility] anticoagulation protocol    Martha Trujillo RN  Anticoagulation Clinic  5/17/2023    _______________________________________________________________________     Anticoagulation Episode Summary     Current INR goal:  2.0-3.0   TTR:  75.4 % (1 y)   Target end date:  Indefinite   Send INR reminders to:  [facility]    Indications    Thrombophilia (H) [D68.59]  Long term (current) use of anticoagulants [Z79.01]           Comments:  [employer] home meter, MBE         Anticoagulation Care Providers     Provider Role Specialty Phone number    Dl Allen MD Referring Family Medicine [phone]

## 2023-05-24 ENCOUNTER — DOCUMENTATION ONLY (OUTPATIENT)
Dept: ANTICOAGULATION | Facility: CLINIC | Age: 68
End: 2023-05-24
Payer: COMMERCIAL

## 2023-05-24 ENCOUNTER — TRANSFERRED RECORDS (OUTPATIENT)
Dept: HEALTH INFORMATION MANAGEMENT | Facility: CLINIC | Age: 68
End: 2023-05-24
Payer: COMMERCIAL

## 2023-05-24 DIAGNOSIS — D68.59 THROMBOPHILIA (H): Primary | ICD-10-CM

## 2023-05-24 DIAGNOSIS — Z79.01 LONG TERM (CURRENT) USE OF ANTICOAGULANTS: ICD-10-CM

## 2023-05-24 LAB — INR (EXTERNAL): 2.4 (ref 0.9–1.1)

## 2023-05-24 NOTE — PROGRESS NOTES
ANTICOAGULATION  MANAGEMENT-Home Monitor Managed by Exception    Matt CROSS Marjoriecristine 67 year old male is on warfarin with therapeutic INR result. (Goal INR 2.0-3.0)    Recent labs: (last 7 days)     05/24/23  0835   INR 2.4*         Previous INR was Therapeutic    Medication, diet, health changes since last INR:chart reviewed; none identified    Contacted within the last 12 weeks by phone on 5/17/23      LISA Howellian was NOT contacted regarding therapeutic result today per home monitoring policy manage by exception agreement.   Current warfarin dose is to be continued:     Summary  As of 5/24/2023    Full warfarin instructions:  5 mg every day   Next INR check:  5/31/2023           ?   Ketty Myers, RN  Anticoagulation Clinic  5/24/2023    _______________________________________________________________________     Anticoagulation Episode Summary     Current INR goal:  2.0-3.0   TTR:  75.4 % (1 y)   Target end date:  Indefinite   Send INR reminders to:  [facility]    Indications    Thrombophilia (H) [D68.59]  Long term (current) use of anticoagulants [Z79.01]           Comments:  [employer] home meter, MBE         Anticoagulation Care Providers     Provider Role Specialty Phone number    Dl Allen MD Referring Family Medicine [phone]

## 2023-05-31 ENCOUNTER — ANTICOAGULATION THERAPY VISIT (OUTPATIENT)
Dept: ANTICOAGULATION | Facility: CLINIC | Age: 68
End: 2023-05-31
Payer: COMMERCIAL

## 2023-05-31 ENCOUNTER — TRANSFERRED RECORDS (OUTPATIENT)
Dept: HEALTH INFORMATION MANAGEMENT | Facility: CLINIC | Age: 68
End: 2023-05-31
Payer: COMMERCIAL

## 2023-05-31 DIAGNOSIS — Z79.01 LONG TERM (CURRENT) USE OF ANTICOAGULANTS: ICD-10-CM

## 2023-05-31 DIAGNOSIS — D68.59 THROMBOPHILIA (H): Primary | ICD-10-CM

## 2023-05-31 LAB — INR (EXTERNAL): 1.9 (ref 0.9–1.1)

## 2023-05-31 NOTE — PROGRESS NOTES
ANTICOAGULATION MANAGEMENT     Matt Rodriguez 67 year old male is on warfarin with subtherapeutic INR result. (Goal INR 2.0-3.0)    Recent labs: (last 7 days)     05/31/23  0753   INR 1.9*       ASSESSMENT       Source(s): Chart Review and Patient/Caregiver Call       Warfarin doses taken: Warfarin taken as instructed    Diet: Increased greens/vitamin K in diet; plans to resume previous intake    Medication/supplement changes: None noted    New illness, injury, or hospitalization: No    Signs or symptoms of bleeding or clotting: No    Previous result: Therapeutic last 2(+) visits    Additional findings: None         PLAN     Recommended plan for temporary change(s) affecting INR     Dosing Instructions: Continue your current warfarin dose with next INR in 1 week       Summary  As of 5/31/2023    Full warfarin instructions:  5 mg every day   Next INR check:  6/7/2023             Telephone call with Matt who verbalizes understanding and agrees to plan    Patient to recheck with home meter    Education provided:     Dietary considerations: importance of consistent vitamin K intake    Plan made per [facility] anticoagulation protocol    Carmita Quiles, RN  Anticoagulation Clinic  5/31/2023    _______________________________________________________________________     Anticoagulation Episode Summary     Current INR goal:  2.0-3.0   TTR:  74.9 % (1 y)   Target end date:  Indefinite   Send INR reminders to:  [facility]    Indications    Thrombophilia (H) [D68.59]  Long term (current) use of anticoagulants [Z79.01]           Comments:  [employer] home meter, MBE         Anticoagulation Care Providers     Provider Role Specialty Phone number    Dl Allen MD Referring Family Medicine [phone]

## 2023-06-08 ENCOUNTER — ANTICOAGULATION THERAPY VISIT (OUTPATIENT)
Dept: ANTICOAGULATION | Facility: CLINIC | Age: 68
End: 2023-06-08
Payer: COMMERCIAL

## 2023-06-08 ENCOUNTER — TRANSFERRED RECORDS (OUTPATIENT)
Dept: HEALTH INFORMATION MANAGEMENT | Facility: CLINIC | Age: 68
End: 2023-06-08
Payer: COMMERCIAL

## 2023-06-08 DIAGNOSIS — Z79.01 LONG TERM (CURRENT) USE OF ANTICOAGULANTS: ICD-10-CM

## 2023-06-08 DIAGNOSIS — D68.59 THROMBOPHILIA (H): Primary | ICD-10-CM

## 2023-06-08 LAB — INR (EXTERNAL): 2.6 (ref 0.9–1.1)

## 2023-06-08 NOTE — PROGRESS NOTES
ANTICOAGULATION MANAGEMENT     Matt Rodriguez 68 year old male is on warfarin with therapeutic INR result. (Goal INR 2.0-3.0)    Recent labs: (last 7 days)     06/08/23  0903   INR 2.6*       ASSESSMENT       Source(s): Chart Review and Patient/Caregiver Call       Warfarin doses taken: Warfarin taken as instructed    Diet: No new diet changes identified    Medication/supplement changes: None noted    New illness, injury, or hospitalization: No    Signs or symptoms of bleeding or clotting: No    Previous result: Subtherapeutic    Additional findings: None         PLAN     Recommended plan for no diet, medication or health factor changes affecting INR     Dosing Instructions: Continue your current warfarin dose with next INR in 1 week       Summary  As of 6/8/2023    Full warfarin instructions:  5 mg every day   Next INR check:  6/15/2023             Telephone call with Matt who verbalizes understanding and agrees to plan    Patient to recheck with home meter    Education provided:     Please call back if any changes to your diet, medications or how you've been taking warfarin    Resume manage by exception with home monitor. Continue to submit INR results to home monitor company.You will only be called when your result is out of range. Please call and notify [facility] if new medication started, dose missed, signs or symptoms of bleeding or clotting, or a surgery/procedure is scheduled.    Contact [phone]  with any changes, questions or concerns.     Plan made per [facility] anticoagulation protocol    Carmita Quiles, RN  Anticoagulation Clinic  6/8/2023    _______________________________________________________________________     Anticoagulation Episode Summary     Current INR goal:  2.0-3.0   TTR:  74.9 % (1 y)   Target end date:  Indefinite   Send INR reminders to:  [facility]    Indications    Thrombophilia (H) [D68.59]  Long term (current) use of anticoagulants [Z79.01]           Comments:  [employer] home  meter, MBE         Anticoagulation Care Providers     Provider Role Specialty Phone number    Dl Allen MD Referring Family Medicine [phone]

## 2023-06-14 ENCOUNTER — TRANSFERRED RECORDS (OUTPATIENT)
Dept: HEALTH INFORMATION MANAGEMENT | Facility: CLINIC | Age: 68
End: 2023-06-14
Payer: COMMERCIAL

## 2023-06-14 ENCOUNTER — DOCUMENTATION ONLY (OUTPATIENT)
Dept: ANTICOAGULATION | Facility: CLINIC | Age: 68
End: 2023-06-14
Payer: COMMERCIAL

## 2023-06-14 DIAGNOSIS — Z79.01 LONG TERM (CURRENT) USE OF ANTICOAGULANTS: ICD-10-CM

## 2023-06-14 DIAGNOSIS — D68.59 THROMBOPHILIA (H): Primary | ICD-10-CM

## 2023-06-14 LAB — INR (EXTERNAL): 2.4 (ref 0.9–1.1)

## 2023-06-14 NOTE — PROGRESS NOTES
ANTICOAGULATION  MANAGEMENT-Home Monitor Managed by Exception    Matt Rodriguez 68 year old male is on warfarin with therapeutic INR result. (Goal INR 2.0-3.0)    Recent labs: (last 7 days)     06/14/23  0839   INR 2.4*         Previous INR was Therapeutic    Medication, diet, health changes since last INR:chart reviewed; none identified    Contacted within the last 12 weeks by phone on 6/8/23      LISA Howellian was NOT contacted regarding therapeutic result today per home monitoring policy manage by exception agreement.   Current warfarin dose is to be continued:     Summary  As of 6/14/2023    Full warfarin instructions:  5 mg every day   Next INR check:  6/28/2023           ?   Jany Mast RN  Anticoagulation Clinic  6/14/2023    _______________________________________________________________________     Anticoagulation Episode Summary     Current INR goal:  2.0-3.0   TTR:  76.5 % (1 y)   Target end date:  Indefinite   Send INR reminders to:  [facility]    Indications    Thrombophilia (H) [D68.59]  Long term (current) use of anticoagulants [Z79.01]           Comments:  [employer] home meter, MBE         Anticoagulation Care Providers     Provider Role Specialty Phone number    Dl Allen MD Referring Family Medicine [phone]

## 2023-06-21 ENCOUNTER — TRANSFERRED RECORDS (OUTPATIENT)
Dept: HEALTH INFORMATION MANAGEMENT | Facility: CLINIC | Age: 68
End: 2023-06-21
Payer: COMMERCIAL

## 2023-06-21 ENCOUNTER — DOCUMENTATION ONLY (OUTPATIENT)
Dept: ANTICOAGULATION | Facility: CLINIC | Age: 68
End: 2023-06-21
Payer: COMMERCIAL

## 2023-06-21 DIAGNOSIS — Z79.01 LONG TERM (CURRENT) USE OF ANTICOAGULANTS: ICD-10-CM

## 2023-06-21 DIAGNOSIS — D68.59 THROMBOPHILIA (H): Primary | ICD-10-CM

## 2023-06-21 LAB — INR (EXTERNAL): 2.5 (ref 0.9–1.1)

## 2023-06-21 NOTE — PROGRESS NOTES
ANTICOAGULATION  MANAGEMENT-Home Monitor Managed by Exception    Matt Rodriguez 68 year old male is on warfarin with therapeutic INR result. (Goal INR 2.0-3.0)    Recent labs: (last 7 days)     06/21/23  0914   INR 2.5*         Previous INR was Therapeutic    Medication, diet, health changes since last INR:chart reviewed; none identified    Contacted within the last 12 weeks by phone on 6/8/23      LISA Howellian was NOT contacted regarding therapeutic result today per home monitoring policy manage by exception agreement.   Current warfarin dose is to be continued:     Summary  As of 6/21/2023    Full warfarin instructions:  5 mg every day   Next INR check:  6/28/2023           ?   Ketty Myers, RN  Anticoagulation Clinic  6/21/2023    _______________________________________________________________________     Anticoagulation Episode Summary     Current INR goal:  2.0-3.0   TTR:  77.6 % (1 y)   Target end date:  Indefinite   Send INR reminders to:  [facility]    Indications    Thrombophilia (H) [D68.59]  Long term (current) use of anticoagulants [Z79.01]           Comments:  [employer] home meter, MBE         Anticoagulation Care Providers     Provider Role Specialty Phone number    Dl Allen MD Referring Family Medicine [phone]

## 2023-06-28 ENCOUNTER — DOCUMENTATION ONLY (OUTPATIENT)
Dept: ANTICOAGULATION | Facility: CLINIC | Age: 68
End: 2023-06-28
Payer: COMMERCIAL

## 2023-06-28 ENCOUNTER — TRANSFERRED RECORDS (OUTPATIENT)
Dept: HEALTH INFORMATION MANAGEMENT | Facility: CLINIC | Age: 68
End: 2023-06-28
Payer: COMMERCIAL

## 2023-06-28 LAB — INR HOME MONITORING: 2.6 RATIO (ref 2–3)

## 2023-06-28 NOTE — PROGRESS NOTES
ANTICOAGULATION  MANAGEMENT-Home Monitor Managed by Exception    Matt Rodriguez 68 year old male is on warfarin with therapeutic INR result. (Goal INR 2.0-3.0)    Recent labs: (last 7 days)     06/28/23  0932   INR 2.6         Previous INR was Therapeutic    Medication, diet, health changes since last INR:chart reviewed; none identified    Contacted within the last 12 weeks by phone on 6/8/23      LISA Gutierrez was NOT contacted regarding therapeutic result today per home monitoring policy manage by exception agreement.   Current warfarin dose is to be continued:     Summary  As of 6/28/2023    Full warfarin instructions:  5 mg every day   Next INR check:  7/5/2023           ?   Martha Trujillo RN  Anticoagulation Clinic  6/28/2023    _______________________________________________________________________     Anticoagulation Episode Summary     Current INR goal:  2.0-3.0   TTR:  79.2 % (1 y)   Target end date:  Indefinite   Send INR reminders to:  [facility]IT[facility]    Indications    Thrombophilia (H) [D68.59]  Long term (current) use of anticoagulants [Z79.01]           Comments:  [employer] home meter, MBE         Anticoagulation Care Providers     Provider Role Specialty Phone number    Dl Allen MD Referring Family Medicine [phone]

## 2023-07-08 ENCOUNTER — TRANSFERRED RECORDS (OUTPATIENT)
Dept: HEALTH INFORMATION MANAGEMENT | Facility: CLINIC | Age: 68
End: 2023-07-08
Payer: COMMERCIAL

## 2023-07-08 LAB — INR HOME MONITORING: 2.6 RATIO (ref 2–3)

## 2023-07-10 ENCOUNTER — DOCUMENTATION ONLY (OUTPATIENT)
Dept: ANTICOAGULATION | Facility: CLINIC | Age: 68
End: 2023-07-10
Payer: COMMERCIAL

## 2023-07-10 DIAGNOSIS — Z79.01 LONG TERM (CURRENT) USE OF ANTICOAGULANTS: ICD-10-CM

## 2023-07-10 DIAGNOSIS — D68.59 THROMBOPHILIA (H): Primary | ICD-10-CM

## 2023-07-10 NOTE — PROGRESS NOTES
ANTICOAGULATION  MANAGEMENT-Home Monitor Managed by Exception    Matt Rodriguez 68 year old male is on warfarin with therapeutic INR result. (Goal INR 2.0-3.0)    Recent labs: (last 7 days)     07/08/23  0709   INR 2.6         Previous INR was Therapeutic    Medication, diet, health changes since last INR:chart reviewed; none identified    Contacted within the last 12 weeks by phone on 6/8/23      LISA Gutierrez was NOT contacted regarding therapeutic result today per home monitoring policy manage by exception agreement.   Current warfarin dose is to be continued:     Summary  As of 7/10/2023    Full warfarin instructions:  5 mg every day   Next INR check:  7/24/2023           ?   Zaida Nevarez RN  Anticoagulation Clinic  7/10/2023    _______________________________________________________________________     Anticoagulation Episode Summary     Current INR goal:  2.0-3.0   TTR:  81.6 % (1 y)   Target end date:  Indefinite   Send INR reminders to:  [facility]IT[facility]    Indications    Thrombophilia (H) [D68.59]  Long term (current) use of anticoagulants [Z79.01]           Comments:  [employer] home meter, MBE         Anticoagulation Care Providers     Provider Role Specialty Phone number    Dl Allen MD Referring Family Medicine [phone]

## 2023-07-12 ENCOUNTER — TRANSFERRED RECORDS (OUTPATIENT)
Dept: HEALTH INFORMATION MANAGEMENT | Facility: CLINIC | Age: 68
End: 2023-07-12
Payer: COMMERCIAL

## 2023-07-12 ENCOUNTER — ANTICOAGULATION THERAPY VISIT (OUTPATIENT)
Dept: ANTICOAGULATION | Facility: CLINIC | Age: 68
End: 2023-07-12
Payer: COMMERCIAL

## 2023-07-12 LAB — INR HOME MONITORING: 2.8 RATIO (ref 2–3)

## 2023-07-12 NOTE — PROGRESS NOTES
ANTICOAGULATION MANAGEMENT     Matt Rodriguez 68 year old male is on warfarin with therapeutic INR result. (Goal INR 2.0-3.0)    Recent labs: (last 7 days)     07/12/23  0746   INR 2.8       ASSESSMENT       Source(s): Chart Review and Patient/Caregiver Call       Warfarin doses taken: Warfarin taken as instructed    Diet: No new diet changes identified    Medication/supplement changes: None noted    New illness, injury, or hospitalization: No    Signs or symptoms of bleeding or clotting: No    Previous result: Therapeutic last 2(+) visits    Additional findings: kamila reports blurry eyes at times. Advised to follow up with eye doctor for exam. Patient in agreement.         PLAN     Recommended plan for no diet, medication or health factor changes affecting INR     Dosing Instructions: Continue your current warfarin dose with next INR in 1 week       Summary  As of 7/12/2023    Full warfarin instructions:  5 mg every day   Next INR check:  7/19/2023             Telephone call with Matt who verbalizes understanding and agrees to plan    Patient to recheck with home meter    Education provided:     Please call back if any changes to your diet, medications or how you've been taking warfarin    Goal range and lab monitoring: goal range and significance of current result    Plan made per [facility] anticoagulation protocol    Martha Trujillo RN  Anticoagulation Clinic  7/12/2023    _______________________________________________________________________     Anticoagulation Episode Summary     Current INR goal:  2.0-3.0   TTR:  81.7 % (1 y)   Target end date:  Indefinite   Send INR reminders to:  [facility]IT[facility]    Indications    Thrombophilia (H) [D68.59]  Long term (current) use of anticoagulants [Z79.01]           Comments:  [employer] home meter, MBE         Anticoagulation Care Providers     Provider Role Specialty Phone number    Dl Allen MD Referring Family Medicine [phone]

## 2023-07-19 ENCOUNTER — ANTICOAGULATION THERAPY VISIT (OUTPATIENT)
Dept: ANTICOAGULATION | Facility: CLINIC | Age: 68
End: 2023-07-19
Payer: COMMERCIAL

## 2023-07-19 DIAGNOSIS — Z79.01 LONG TERM (CURRENT) USE OF ANTICOAGULANTS: ICD-10-CM

## 2023-07-19 DIAGNOSIS — D68.59 THROMBOPHILIA (H): Primary | ICD-10-CM

## 2023-07-19 LAB — INR HOME MONITORING: 3.1 RATIO (ref 2–3)

## 2023-07-19 NOTE — PROGRESS NOTES
ANTICOAGULATION MANAGEMENT     Matt Rodriguez 68 year old male is on warfarin with supratherapeutic INR result. (Goal INR 2.0-3.0)    Recent labs: (last 7 days)     07/19/23  0810   INR 3.1*       ASSESSMENT       Source(s): Chart Review and Patient/Caregiver Call       Warfarin doses taken: Warfarin taken as instructed    Diet: Decreased greens/vitamin K in diet; plans to resume previous intake    Medication/supplement changes: None noted    New illness, injury, or hospitalization: No    Signs or symptoms of bleeding or clotting: No    Previous result: Therapeutic last 2(+) visits    Additional findings: None         PLAN     Recommended plan for temporary change(s) affecting INR     Dosing Instructions: Continue your current warfarin dose with next INR in 1 week       Summary  As of 7/19/2023    Full warfarin instructions:  5 mg every day   Next INR check:  7/26/2023             Telephone call with Matt who verbalizes understanding and agrees to plan and who agrees to plan and repeated back plan correctly    Patient to recheck with home meter    Education provided:     Contact [phone]  with any changes, questions or concerns.     Plan made per [facility] anticoagulation protocol    Virginia Benitez RN  Anticoagulation Clinic  7/19/2023    _______________________________________________________________________     Anticoagulation Episode Summary     Current INR goal:  2.0-3.0   TTR:  81.1 % (1 y)   Target end date:  Indefinite   Send INR reminders to:  [facility]IT[facility]    Indications    Thrombophilia (H) [D68.59]  Long term (current) use of anticoagulants [Z79.01]           Comments:  [employer] home meter, MBE         Anticoagulation Care Providers     Provider Role Specialty Phone number    Dl Allen MD Referring Family Medicine [phone]

## 2023-07-26 ENCOUNTER — ANTICOAGULATION THERAPY VISIT (OUTPATIENT)
Dept: ANTICOAGULATION | Facility: CLINIC | Age: 68
End: 2023-07-26
Payer: COMMERCIAL

## 2023-07-26 DIAGNOSIS — D68.59 THROMBOPHILIA (H): Primary | ICD-10-CM

## 2023-07-26 DIAGNOSIS — Z79.01 LONG TERM (CURRENT) USE OF ANTICOAGULANTS: ICD-10-CM

## 2023-07-26 LAB — INR HOME MONITORING: 3.7 RATIO (ref 2–3)

## 2023-07-26 NOTE — PROGRESS NOTES
ANTICOAGULATION MANAGEMENT     Matt Rodriguez 68 year old male is on warfarin with supratherapeutic INR result. (Goal INR 2.0-3.0)    Recent labs: (last 7 days)     07/26/23  0558   INR 3.7*       ASSESSMENT     Source(s): Chart Review and Patient/Caregiver Call     Warfarin doses taken: Warfarin taken as instructed  Diet:  patient is traveling and eating differently, not many greens/vegetables. He will seek out greens over the next week to improve diet, also eating more protein.  Medication/supplement changes: None noted  New illness, injury, or hospitalization: No  Signs or symptoms of bleeding or clotting: No  Previous result: Supratherapeutic  Additional findings:  Recommended patient hold warfarin dose today. Patient prefers to have vitamin K containing foods including greens/vegetables as least 3 servings over the upcoming week to lower INR. Through shared decision making and review of diet changes patient will take a partial hold of warfarin and have intake of greens to lower INR.  Patient unsure when he will be returning home       PLAN     Recommended plan for temporary change(s) affecting INR     Dosing Instructions:  7/26/23: 2.5 mgs, then continue current maintenance dose of 5 mgs daily  with next INR in 1 week. Patient will increase greens intake over the upcoming week.     Summary  As of 7/26/2023      Full warfarin instructions:  7/26: 2.5 mg; Otherwise 5 mg every day   Next INR check:  8/2/2023               Telephone call with Matt who agrees to plan and repeated back plan correctly    Patient to recheck with home meter    Education provided:   Please call back if any changes to your diet, medications or how you've been taking warfarin  Goal range and lab monitoring: goal range and significance of current result  Dietary considerations: impact of vitamin K foods on INR    Plan made per [facility] anticoagulation protocol    Martha Trujillo RN  Anticoagulation  Clinic  7/26/2023    _______________________________________________________________________     Anticoagulation Episode Summary       Current INR goal:  2.0-3.0   TTR:  79.2 % (1 y)   Target end date:  Indefinite   Send INR reminders to:  [facility] MONITOR[facility]    Indications    Thrombophilia (H) [D68.59]  Long term (current) use of anticoagulants [Z79.01]             Comments:  [employer] home meter, MBE             Anticoagulation Care Providers       Provider Role Specialty Phone number    Dl Allen MD Referring Family Medicine [phone]

## 2023-08-08 ENCOUNTER — ANTICOAGULATION THERAPY VISIT (OUTPATIENT)
Dept: ANTICOAGULATION | Facility: CLINIC | Age: 68
End: 2023-08-08
Payer: COMMERCIAL

## 2023-08-08 DIAGNOSIS — Z79.01 LONG TERM (CURRENT) USE OF ANTICOAGULANTS: ICD-10-CM

## 2023-08-08 DIAGNOSIS — D68.59 THROMBOPHILIA (H): Primary | ICD-10-CM

## 2023-08-08 LAB — INR HOME MONITORING: 3.4 RATIO (ref 2–3)

## 2023-08-08 NOTE — PROGRESS NOTES
ANTICOAGULATION MANAGEMENT     Matt Rodriguez 68 year old male is on warfarin with supratherapeutic INR result. (Goal INR 2.0-3.0)    Recent labs: (last 7 days)     08/08/23  0744   INR 3.4*       ASSESSMENT     Source(s): Chart Review and Patient/Caregiver Call     Warfarin doses taken: Warfarin taken as instructed, patient held the last high.  Diet: Decreased greens/vitamin K in diet; plans to resume previous intake  Medication/supplement changes: None noted  New illness, injury, or hospitalization: No  Signs or symptoms of bleeding or clotting: No  Previous result: Supratherapeutic  Additional findings: None  Patient is now back home and will get back to his normal intake of greens.       PLAN     Recommended plan for temporary change(s) affecting INR     Dosing Instructions: partial hold then continue your current warfarin dose with next INR in 1 week       Summary  As of 8/8/2023      Full warfarin instructions:  8/8: 2.5 mg; Otherwise 5 mg every day   Next INR check:  8/15/2023               Telephone call with Matt who verbalizes understanding and agrees to plan    Patient to recheck with home meter    Education provided:   None required  Patient did want to review what happens if he needs to get off warfarin for a procedure. Reviewed with the patient.    Plan made per [facility] anticoagulation protocol    Carmita Quiles, RN  Anticoagulation Clinic  8/8/2023    _______________________________________________________________________     Anticoagulation Episode Summary       Current INR goal:  2.0-3.0   TTR:  75.6 % (1 y)   Target end date:  Indefinite   Send INR reminders to:  [facility] HOME MONITOR[facility]    Indications    Thrombophilia (H) [D68.59]  Long term (current) use of anticoagulants [Z79.01]             Comments:  [employer] home meter, MBE             Anticoagulation Care Providers       Provider Role Specialty Phone number    Dl Allen MD Referring Family Medicine [phone]

## 2023-08-16 ENCOUNTER — ANTICOAGULATION THERAPY VISIT (OUTPATIENT)
Dept: ANTICOAGULATION | Facility: CLINIC | Age: 68
End: 2023-08-16
Payer: COMMERCIAL

## 2023-08-16 LAB — INR HOME MONITORING: 2.3 RATIO (ref 2–3)

## 2023-08-16 NOTE — PROGRESS NOTES
.ANTICOAGULATION MANAGEMENT     Matt Rodriguez 68 year old male is on warfarin with therapeutic INR result. (Goal INR 2.0-3.0)    Recent labs: (last 7 days)     08/16/23  0902   INR 2.3       ASSESSMENT     Source(s): Chart Review and Patient/Caregiver Call     Warfarin doses taken: Warfarin taken as instructed  Diet: No new diet changes identified  Medication/supplement changes: None noted  New illness, injury, or hospitalization: No  Signs or symptoms of bleeding or clotting: No  Previous result: Supratherapeutic  Additional findings: None       PLAN     Recommended plan for no diet, medication or health factor changes affecting INR     Dosing Instructions: Continue your current warfarin dose with next INR in 1 week       Summary  As of 8/16/2023      Full warfarin instructions:  5 mg every day   Next INR check:  8/23/2023               Telephone call with Matt who agrees to plan and repeated back plan correctly    Patient to recheck with home meter    Education provided:   Please call back if any changes to your diet, medications or how you've been taking warfarin  Goal range and lab monitoring: goal range and significance of current result    Plan made per [facility] anticoagulation protocol    Martha Trujillo RN  Anticoagulation Clinic  8/16/2023    _______________________________________________________________________     Anticoagulation Episode Summary       Current INR goal:  2.0-3.0   TTR:  74.8 % (1 y)   Target end date:  Indefinite   Send INR reminders to:  [facility] HOME [facility]IT[facility]    Indications    Thrombophilia (H) [D68.59]  Long term (current) use of anticoagulants [Z79.01]             Comments:  [employer] home meter, MBE             Anticoagulation Care Providers       Provider Role Specialty Phone number    Dl Allen MD Referring Family Medicine [phone]

## 2023-08-24 ENCOUNTER — ANTICOAGULATION THERAPY VISIT (OUTPATIENT)
Dept: ANTICOAGULATION | Facility: CLINIC | Age: 68
End: 2023-08-24
Payer: COMMERCIAL

## 2023-08-24 DIAGNOSIS — Z79.01 LONG TERM (CURRENT) USE OF ANTICOAGULANTS: ICD-10-CM

## 2023-08-24 DIAGNOSIS — D68.59 THROMBOPHILIA (H): Primary | ICD-10-CM

## 2023-08-24 LAB — INR HOME MONITORING: 2.1 RATIO (ref 2–3)

## 2023-08-24 NOTE — PROGRESS NOTES
ANTICOAGULATION MANAGEMENT     Matt Rodriguez 68 year old male is on warfarin with therapeutic INR result. (Goal INR 2.0-3.0)    Recent labs: (last 7 days)     08/24/23  1038   INR 2.1       ASSESSMENT     Source(s): Chart Review and Patient/Caregiver Call     Warfarin doses taken: Warfarin taken as instructed  Diet: No new diet changes identified  Medication/supplement changes: None noted  New illness, injury, or hospitalization: No  Signs or symptoms of bleeding or clotting: No  Previous result: Therapeutic last visit; previously outside of goal range  Additional findings: None       PLAN     Recommended plan for no diet, medication or health factor changes affecting INR     Dosing Instructions: Continue your current warfarin dose with next INR in 1 week       Summary  As of 8/24/2023      Full warfarin instructions:  5 mg every day   Next INR check:  8/31/2023               Telephone call with Matt who verbalizes understanding and agrees to plan    Patient to recheck with home meter    Education provided:   Please call back if any changes to your diet, medications or how you've been taking warfarin  Resume manage by exception with home monitor. Continue to submit INR results to home monitor company.You will only be called when your result is out of range. Please call and notify [facility] if new medication started, dose missed, signs or symptoms of bleeding or clotting, or a surgery/procedure is scheduled.    Plan made per [facility] anticoagulation protocol    Tania Liriano RN  Anticoagulation Clinic  8/24/2023    _______________________________________________________________________     Anticoagulation Episode Summary       Current INR goal:  2.0-3.0   TTR:  75.9 % (1 y)   Target end date:  Indefinite   Send INR reminders to:  [facility]IT[facility]    Indications    Thrombophilia (H) [D68.59]  Long term (current) use of anticoagulants [Z79.01]             Comments:  [employer] home meter, MBE             Anticoagulation  Care Providers       Provider Role Specialty Phone number    Dl Allen MD Referring Family Medicine [phone]

## 2023-09-04 LAB — INR HOME MONITORING: 2.9 RATIO (ref 2–3)

## 2023-09-05 ENCOUNTER — DOCUMENTATION ONLY (OUTPATIENT)
Dept: ANTICOAGULATION | Facility: CLINIC | Age: 68
End: 2023-09-05
Payer: COMMERCIAL

## 2023-09-05 DIAGNOSIS — D68.59 THROMBOPHILIA (H): Primary | ICD-10-CM

## 2023-09-05 DIAGNOSIS — Z79.01 LONG TERM (CURRENT) USE OF ANTICOAGULANTS: ICD-10-CM

## 2023-09-05 NOTE — PROGRESS NOTES
ANTICOAGULATION  MANAGEMENT-Home Monitor Managed by Exception    Matt Rodriguez 68 year old male is on warfarin with therapeutic INR result. (Goal INR 2.0-3.0)    Recent labs: (last 7 days)     09/04/23  0924   INR 2.9       Previous INR was Therapeutic  Medication, diet, health changes since last INR:chart reviewed; none identified  Contacted within the last 12 weeks by phone on 8/24/23      LISA Gutierrez was NOT contacted regarding therapeutic result today per home monitoring policy manage by exception agreement.   Current warfarin dose is to be continued:     Summary  As of 9/5/2023      Full warfarin instructions:  5 mg every day   Next INR check:  9/11/2023             ?   Jany Mast RN  Anticoagulation Clinic  9/5/2023    _______________________________________________________________________     Anticoagulation Episode Summary       Current INR goal:  2.0-3.0   TTR:  76.3 % (1 y)   Target end date:  Indefinite   Send INR reminders to:  [facility]IT[facility]    Indications    Thrombophilia (H) [D68.59]  Long term (current) use of anticoagulants [Z79.01]             Comments:  [employer] home meter, MBE             Anticoagulation Care Providers       Provider Role Specialty Phone number    Dl Allen MD Referring Family Medicine [phone]

## 2023-09-12 ENCOUNTER — DOCUMENTATION ONLY (OUTPATIENT)
Dept: ANTICOAGULATION | Facility: CLINIC | Age: 68
End: 2023-09-12
Payer: COMMERCIAL

## 2023-09-12 DIAGNOSIS — D68.59 THROMBOPHILIA (H): Primary | ICD-10-CM

## 2023-09-12 DIAGNOSIS — Z79.01 LONG TERM (CURRENT) USE OF ANTICOAGULANTS: ICD-10-CM

## 2023-09-12 LAB — INR HOME MONITORING: 2.2 RATIO (ref 2–3)

## 2023-09-12 NOTE — PROGRESS NOTES
ANTICOAGULATION  MANAGEMENT-Home Monitor Managed by Exception    Matt Rodriguez 68 year old male is on warfarin with therapeutic INR result. (Goal INR 2.0-3.0)    Recent labs: (last 7 days)     09/12/23  0829   INR 2.2       Previous INR was Therapeutic  Medication, diet, health changes since last INR:chart reviewed; none identified  Contacted within the last 12 weeks by phone on 8/24/23      LISA Gutierrez was NOT contacted regarding therapeutic result today per home monitoring policy manage by exception agreement.   Current warfarin dose is to be continued:     Summary  As of 9/12/2023      Full warfarin instructions:  5 mg every day   Next INR check:  9/19/2023             ?   Ketty Myers, RN  Anticoagulation Clinic  9/12/2023    _______________________________________________________________________     Anticoagulation Episode Summary       Current INR goal:  2.0-3.0   TTR:  77.1 % (1 y)   Target end date:  Indefinite   Send INR reminders to:  [facility]IT[facility]    Indications    Thrombophilia (H) [D68.59]  Long term (current) use of anticoagulants [Z79.01]             Comments:  [employer] home meter, MBE             Anticoagulation Care Providers       Provider Role Specialty Phone number    Dl Allen MD Referring Family Medicine [phone]

## 2023-09-14 ENCOUNTER — DOCUMENTATION ONLY (OUTPATIENT)
Dept: ANTICOAGULATION | Facility: CLINIC | Age: 68
End: 2023-09-14
Payer: COMMERCIAL

## 2023-09-14 DIAGNOSIS — Z86.711 HISTORY OF PULMONARY EMBOLISM: Primary | ICD-10-CM

## 2023-09-14 NOTE — PROGRESS NOTES
ANTICOAGULATION CLINIC REFERRAL RENEWAL REQUEST       An annual renewal order is required for all patients referred to [facility]tico[facility].?  Please review and sign the pended referral order for Matt Rodriguez.       ANTICOAGULATION SUMMARY      Warfarin indication(s)   Unprovoked pulmonary embolism    Mechanical heart valve present?  NO       Current goal range   INR: 2.0-3.0     Goal appropriate for indication? Goal INR 2-3, standard for indication(s) above     Time in Therapeutic Range (TTR)  (Goal > 60%) 77.1%       Office visit with referring provider's group within last year yes on 10/17/2022       Carmita Quiles, RN  [facility]

## 2023-09-16 ENCOUNTER — HOSPITAL ENCOUNTER (OUTPATIENT)
Dept: CT IMAGING | Facility: HOSPITAL | Age: 68
Discharge: HOME | End: 2023-09-16
Attending: PHYSICIAN ASSISTANT
Payer: COMMERCIAL

## 2023-09-16 ENCOUNTER — OFFICE VISIT (OUTPATIENT)
Dept: FAMILY MEDICINE | Facility: CLINIC | Age: 68
End: 2023-09-16
Payer: COMMERCIAL

## 2023-09-16 VITALS
DIASTOLIC BLOOD PRESSURE: 80 MMHG | BODY MASS INDEX: 24.03 KG/M2 | SYSTOLIC BLOOD PRESSURE: 135 MMHG | RESPIRATION RATE: 16 BRPM | OXYGEN SATURATION: 97 % | WEIGHT: 174.7 LBS | HEART RATE: 63 BPM

## 2023-09-16 DIAGNOSIS — R10.9 LEFT FLANK PAIN: Primary | ICD-10-CM

## 2023-09-16 DIAGNOSIS — Z79.01 LONG TERM (CURRENT) USE OF ANTICOAGULANTS: ICD-10-CM

## 2023-09-16 DIAGNOSIS — R10.9 LEFT FLANK PAIN: ICD-10-CM

## 2023-09-16 LAB
ALBUMIN UR-MCNC: NEGATIVE MG/DL
APPEARANCE UR: CLEAR
BILIRUB UR QL STRIP: NEGATIVE
COLOR UR AUTO: YELLOW
CREAT BLD-MCNC: 0.9 MG/DL (ref 0.7–1.3)
EGFRCR SERPLBLD CKD-EPI 2021: >60 ML/MIN/1.73M2
GLUCOSE UR STRIP-MCNC: NEGATIVE MG/DL
HGB UR QL STRIP: NEGATIVE
KETONES UR STRIP-MCNC: NEGATIVE MG/DL
LEUKOCYTE ESTERASE UR QL STRIP: NEGATIVE
NITRATE UR QL: NEGATIVE
PH UR STRIP: 6.5 [PH] (ref 5–8)
SP GR UR STRIP: 1.01 (ref 1–1.03)
UROBILINOGEN UR STRIP-ACNC: 0.2 E.U./DL

## 2023-09-16 PROCEDURE — 71275 CT ANGIOGRAPHY CHEST: CPT

## 2023-09-16 PROCEDURE — 82565 ASSAY OF CREATININE: CPT

## 2023-09-16 PROCEDURE — 99213 OFFICE O/P EST LOW 20 MIN: CPT | Performed by: PHYSICIAN ASSISTANT

## 2023-09-16 PROCEDURE — 250N000011 HC RX IP 250 OP 636: Mod: JZ | Performed by: PHYSICIAN ASSISTANT

## 2023-09-16 PROCEDURE — 81003 URINALYSIS AUTO W/O SCOPE: CPT | Performed by: PHYSICIAN ASSISTANT

## 2023-09-16 PROCEDURE — 74177 CT ABD & PELVIS W/CONTRAST: CPT

## 2023-09-16 RX ORDER — IOPAMIDOL 755 MG/ML
90 INJECTION, SOLUTION INTRAVASCULAR ONCE
Status: COMPLETED | OUTPATIENT
Start: 2023-09-16 | End: 2023-09-16

## 2023-09-16 RX ADMIN — IOPAMIDOL 90 ML: 755 INJECTION, SOLUTION INTRAVENOUS at 10:52

## 2023-09-16 NOTE — PROGRESS NOTES
Patient presents with:  Rib Pain: X1wk, pain with movement, can't recall any injury       Clinical Decision Making: Ddx: PE, rib fracture, muscles strain, shingles, nephrolithiasis, pyelonephritis. Cts neg for PE, rib fracture, pneumonia, and nephrolithiasis. UA neg for signs of UTI. Suspect muscle strain. Recommend supportive cares.       ICD-10-CM    1. Left flank pain  R10.9 UA Macroscopic with reflex to Microscopic and Culture - Clinic Collect     CT Abdomen Pelvis w Contrast     CANCELED: CT Chest w Contrast      2. Long term (current) use of anticoagulants  Z79.01           Patient Instructions   Your CT scans are negative for rib fractures, pneumonia, pulmonary embolism (PE), kidney stones.   This may be muscular. Your kidney function was normal today. I recommend warm compresses and Tylenol 1000 mg every 6 hours for pain relief.   I will call you if your urine test is showing any signs of urinary tract infection.   Please follow up if you develop rash or new/worsening symptoms.       HPI:  Matt Rodriguez is a 68 year old male with PMHx of PE on Warfarin who presents today complaining of posterior left rib pain x 1 week. Pain is worse with movement. NKI. He did have some chills last night. No other sick sxs. Pain is pleuritic at times, but not 100% of the time. It can be difficult to reproduce the pain with palpation.     History obtained from the patient.    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   Constitutional:       General: He is not in acute distress.     Appearance: He is not toxic-appearing or diaphoretic.   HENT:      Head: Normocephalic and atraumatic.      Right Ear: External ear normal.      Left Ear: External ear normal.   Eyes:      Conjunctiva/sclera: Conjunctivae normal.   Pulmonary:      Effort: Pulmonary effort is normal. No respiratory distress.   Abdominal:      Tenderness: There is no right CVA tenderness or left CVA tenderness.   Skin:     Findings: No rash.          Neurological:      Mental Status: He is alert.   Psychiatric:         Mood and Affect: Mood normal.         Behavior: Behavior normal.         Thought Content: Thought content normal.         Judgment: Judgment normal.         Results:  Results for orders placed or performed during the hospital encounter of 09/16/23   CT Abdomen Pelvis w Contrast     Status: None    Narrative    EXAM: CT ABDOMEN PELVIS W CONTRAST  LOCATION: [facility]  DATE: 9/16/2023    INDICATION: Left flank pain. NKI. Hx of PE and clotting disorder. R o PE, rib fracture, nephrolithiasis.  COMPARISON: None.  TECHNIQUE: CT scan of the abdomen and pelvis was performed following injection of IV contrast. Multiplanar reformats were obtained. Dose reduction techniques were used.  CONTRAST: 90 mL Isovue-370.    FINDINGS:   LOWER CHEST: Refer to same-day CT chest.    HEPATOBILIARY: Subcentimeter hypoattenuating lesion within the left hepatic lobe, too small to characterize but likely represents a benign cyst. No radiopaque gallstones. No biliary ductal dilatation.    PANCREAS: No significant mass, duct dilatation, or inflammatory change.    SPLEEN: Normal.    ADRENAL GLANDS: Normal.    KIDNEYS/BLADDER: Symmetric enhancement. No  hydronephrosis. Subcentimeter lesion of the left lower pole kidney is too small to characterize but does not require specific follow-up. Urinary bladder is unremarkable.    BOWEL: No obstruction or inflammatory change. Normal caliber appendix.    LYMPH NODES: No lymphadenopathy.    VASCULATURE: Unremarkable.    PELVIC ORGANS: Heterogeneous enhancement of the prostate. Prostatomegaly.    MUSCULOSKELETAL: Advanced degenerative disc disease at L5-S1. Nonaggressive appearing sclerotic rim lesion of the right iliac bone.      Impression    IMPRESSION:   1.  No acute CT findings within the abdomen or pelvis.  2.  Enlarged prostate with heterogeneous enhancement. Correlate with PSA values.   Results for orders placed or performed during the hospital encounter of 09/16/23   CT Chest Pulmonary Embolism w Contrast     Status: None    Narrative    EXAM: CT CHEST PULMONARY EMBOLISM W CONTRAST  LOCATION: [facility]  DATE: 9/16/2023    INDICATION: Left flank pain. NKI. Hx of PE and clotting disorder. R o PE, rib fracture, nephrolithiasis.  COMPARISON: None.  TECHNIQUE: CT chest pulmonary angiogram during arterial phase injection of IV contrast. Multiplanar reformats and MIP reconstructions were performed. Dose reduction techniques were used.   CONTRAST: isovue 370 90ml    FINDINGS:  ANGIOGRAM CHEST: Pulmonary arteries are normal caliber and negative for pulmonary emboli. Normal caliber thoracic aorta. Suboptimal evaluation for a thoracic or dissection. No grossly evident dissection. No CT evidence of right heart strain.    LUNGS AND PLEURA: The central airways are clear. Mild bibasilar atelectasis/scarring. No focal pneumonic consolidation or pleural effusion. No mass or suspicious nodule.     MEDIASTINUM/AXILLAE: No thoracic adenopathy. Normal heart size and no pericardial effusion.    CORONARY ARTERY CALCIFICATION: None.    UPPER ABDOMEN: Normal.    MUSCULOSKELETAL: Incidental midthoracic vertebral body  osseous hemangioma.      Impression    IMPRESSION:  1.  No acute findings. No pulmonary artery embolism.  2.  No pneumonic infiltrate, pleural effusion or rib fracture.   Creatinine POCT     Status: Normal   Result Value Ref Range    Creatinine POCT 0.9 0.7 - 1.3 mg/dL    GFR, ESTIMATED POCT >60 >60 mL/min/1.73m2   Results for orders placed or performed in visit on 09/16/23   UA Macroscopic with reflex to Microscopic and Culture - Clinic Collect     Status: Normal    Specimen: Urine, Clean Catch   Result Value Ref Range    Color Urine Yellow Colorless, Straw, Light Yellow, Yellow    Appearance Urine Clear Clear    Glucose Urine Negative Negative mg/dL    Bilirubin Urine Negative Negative    Ketones Urine Negative Negative mg/dL    Specific Gravity Urine 1.015 1.005 - 1.030    Blood Urine Negative Negative    pH Urine 6.5 5.0 - 8.0    Protein Albumin Urine Negative Negative mg/dL    Urobilinogen Urine 0.2 0.2, 1.0 E.U./dL    Nitrite Urine Negative Negative    Leukocyte Esterase Urine Negative Negative    Narrative    Microscopic not indicated         At the end of the encounter, I discussed results, diagnosis, medications. Discussed red flags for immediate return to clinic/ER, as well as indications for follow up if no improvement. Patient understood and agreed to plan. Patient was stable for discharge.

## 2023-09-16 NOTE — PATIENT INSTRUCTIONS
Your CT scans are negative for rib fractures, pneumonia, pulmonary embolism (PE), kidney stones.   This may be muscular. Your kidney function was normal today. I recommend warm compresses and Tylenol 1000 mg every 6 hours for pain relief.   I will call you if your urine test is showing any signs of urinary tract infection.   Please follow up if you develop rash or new/worsening symptoms.

## 2023-09-19 ENCOUNTER — DOCUMENTATION ONLY (OUTPATIENT)
Dept: ANTICOAGULATION | Facility: CLINIC | Age: 68
End: 2023-09-19
Payer: COMMERCIAL

## 2023-09-19 DIAGNOSIS — Z86.711 HISTORY OF PULMONARY EMBOLISM: ICD-10-CM

## 2023-09-19 DIAGNOSIS — D68.59 THROMBOPHILIA (H): Primary | ICD-10-CM

## 2023-09-19 DIAGNOSIS — Z79.01 LONG TERM (CURRENT) USE OF ANTICOAGULANTS: ICD-10-CM

## 2023-09-19 LAB — INR HOME MONITORING: 3 RATIO (ref 2–3)

## 2023-09-19 NOTE — PROGRESS NOTES
ANTICOAGULATION  MANAGEMENT-Home Monitor Managed by Exception    Matt Rodriguez 68 year old male is on warfarin with therapeutic INR result. (Goal INR 2.0-3.0)    Recent labs: (last 7 days)     09/19/23  0857   INR 3       Previous INR was Therapeutic  Medication, diet, health changes since last INR:chart reviewed; none identified  Contacted within the last 12 weeks by phone on 8/24/23      LISA Gutierrez was NOT contacted regarding therapeutic result today per home monitoring policy manage by exception agreement.   Current warfarin dose is to be continued:     Summary  As of 9/19/2023      Full warfarin instructions:  5 mg every day   Next INR check:  9/26/2023             ?   Ketty Myers, RN  Anticoagulation Clinic  9/19/2023    _______________________________________________________________________     Anticoagulation Episode Summary       Current INR goal:  2.0-3.0   TTR:  77.1 % (1 y)   Target end date:  Indefinite   Send INR reminders to:  [facility]AG HOME MONITORING    Indications    Thrombophilia (H) [D68.59]  Long term (current) use of anticoagulants [Z79.01]  History of pulmonary embolism [Z86.711]             Comments:  [employer] home meter, MBANNE-MARIE             Anticoagulation Care Providers       Provider Role Specialty Phone number    Dl Allen MD Referring Family Medicine [phone]

## 2023-09-26 ENCOUNTER — ANTICOAGULATION THERAPY VISIT (OUTPATIENT)
Dept: ANTICOAGULATION | Facility: CLINIC | Age: 68
End: 2023-09-26
Payer: COMMERCIAL

## 2023-09-26 DIAGNOSIS — Z86.711 HISTORY OF PULMONARY EMBOLISM: ICD-10-CM

## 2023-09-26 DIAGNOSIS — Z79.01 LONG TERM (CURRENT) USE OF ANTICOAGULANTS: ICD-10-CM

## 2023-09-26 DIAGNOSIS — D68.59 THROMBOPHILIA (H): Primary | ICD-10-CM

## 2023-09-26 LAB — INR HOME MONITORING: 3.9 RATIO (ref 2–3)

## 2023-09-26 NOTE — PROGRESS NOTES
ANTICOAGULATION MANAGEMENT     Matt R Michael 68 year old male is on warfarin with supratherapeutic INR result. (Goal INR 2.0-3.0)    Recent labs: (last 7 days)     09/26/23  0913   INR 3.9*       ASSESSMENT     Source(s): Chart Review and Patient/Caregiver Call     Warfarin doses taken: Warfarin taken as instructed  Diet: No new diet changes identified  Medication/supplement changes:  Tylenol as needed use which may be increasing INR today  New illness, injury, or hospitalization: Yes: OV on 9/16 for left flank pain per patient pain is now resolved  Signs or symptoms of bleeding or clotting: No  Previous result: Therapeutic last 2(+) visits  Additional findings: None       PLAN     Recommended plan for temporary change(s) affecting INR     Dosing Instructions: hold dose then continue your current warfarin dose with next INR in 1 week       Summary  As of 9/26/2023      Full warfarin instructions:  9/26: Hold; Otherwise 5 mg every day   Next INR check:  10/3/2023               Telephone call with Matt who verbalizes understanding and agrees to plan    Patient to recheck with home meter    Education provided:   Goal range and lab monitoring: goal range and significance of current result  Interaction IS anticipated between warfarin and tylenol  Symptom monitoring: monitoring for bleeding signs and symptoms    Plan made per [facility] anticoagulation protocol    Zaida Gamez RN  Anticoagulation Clinic  9/26/2023    _______________________________________________________________________     Anticoagulation Episode Summary       Current INR goal:  2.0-3.0   TTR:  75.2 % (1 y)   Target end date:  Indefinite   Send INR reminders to:  ANT[facility]AG HOME MONITORING    Indications    Thrombophilia (H) [D68.59]  Long term (current) use of anticoagulants [Z79.01]  History of pulmonary embolism [Z86.711]             Comments:  [employer] home meter, MBANNE-MARIE             Anticoagulation Care Providers       Provider Role Specialty Phone number    St  Dl Carnes MD [facility] [phone]

## 2023-10-03 ENCOUNTER — ANTICOAGULATION THERAPY VISIT (OUTPATIENT)
Dept: ANTICOAGULATION | Facility: CLINIC | Age: 68
End: 2023-10-03
Payer: COMMERCIAL

## 2023-10-03 DIAGNOSIS — Z86.711 HISTORY OF PULMONARY EMBOLISM: ICD-10-CM

## 2023-10-03 DIAGNOSIS — Z79.01 LONG TERM (CURRENT) USE OF ANTICOAGULANTS: ICD-10-CM

## 2023-10-03 DIAGNOSIS — D68.59 THROMBOPHILIA (H): Primary | ICD-10-CM

## 2023-10-03 LAB — INR HOME MONITORING: 3.1 RATIO (ref 2–3)

## 2023-10-03 NOTE — PROGRESS NOTES
ANTICOAGULATION MANAGEMENT     Mattalf Amadorcristine 68 year old male is on warfarin with supratherapeutic INR result. (Goal INR 2.0-3.0)    Recent labs: (last 7 days)     10/03/23  0844   INR 3.1*       ASSESSMENT     Source(s): Chart Review and Patient/Caregiver Call     Warfarin doses taken: Held for INR of 3.9  recently which may be affecting INR  Diet: No new diet changes identified  Medication/supplement changes: None noted  New illness, injury, or hospitalization: No   Signs or symptoms of bleeding or clotting: No  Previous result: Supratherapeutic  Additional findings: None       PLAN     Recommended plan for temporary change(s) affecting INR     Dosing Instructions: Continue your current warfarin dose  patient plans to add greens today to help bring inr down~with next INR in 1 week       Summary  As of 10/3/2023      Full warfarin instructions:  5 mg every day   Next INR check:  10/10/2023               Telephone call with Matt who verbalizes understanding and agrees to plan    Patient to recheck with home meter    Education provided:   Goal range and lab monitoring: goal range and significance of current result  Dietary considerations: impact of vitamin K foods on INR    Plan made per [facility] anticoagulation protocol    Zaida Gamez RN  Anticoagulation Clinic  10/3/2023    _______________________________________________________________________     Anticoagulation Episode Summary       Current INR goal:  2.0-3.0   TTR:  73.3 % (1 y)   Target end date:  Indefinite   Send INR reminders to:  ANTICOAG HOME MONITORING    Indications    Thrombophilia (H24) [D68.59]  Long term (current) use of anticoagulants [Z79.01]  History of pulmonary embolism [Z86.711]             Comments:  [employer] home meter, MBE             Anticoagulation Care Providers       Provider Role Specialty Phone number    Dl Allen MD Referring Family Medicine [phone]

## 2023-10-12 ENCOUNTER — ANTICOAGULATION THERAPY VISIT (OUTPATIENT)
Dept: ANTICOAGULATION | Facility: CLINIC | Age: 68
End: 2023-10-12
Payer: COMMERCIAL

## 2023-10-12 DIAGNOSIS — D68.59 THROMBOPHILIA (H): Primary | ICD-10-CM

## 2023-10-12 DIAGNOSIS — Z79.01 LONG TERM (CURRENT) USE OF ANTICOAGULANTS: ICD-10-CM

## 2023-10-12 DIAGNOSIS — Z86.711 HISTORY OF PULMONARY EMBOLISM: ICD-10-CM

## 2023-10-12 LAB — INR HOME MONITORING: 2.8 RATIO (ref 2–3)

## 2023-10-12 NOTE — PROGRESS NOTES
ANTICOAGULATION MANAGEMENT     Matt Rodriguez 68 year old male is on warfarin with therapeutic INR result. (Goal INR 2.0-3.0)    Recent labs: (last 7 days)     10/12/23  1202   INR 2.8       ASSESSMENT     Source(s): Chart Review and Patient/Caregiver Call     Warfarin doses taken: Warfarin taken as instructed  Diet: No new diet changes identified  Medication/supplement changes: None noted  New illness, injury, or hospitalization: No  Signs or symptoms of bleeding or clotting: notes some blood in stools and bloody nose. Informed to let provider know at appointment next week.  Previous result: Supratherapeutic  Additional findings: None       PLAN     Recommended plan for no diet, medication or health factor changes affecting INR     Dosing Instructions: Continue your current warfarin dose with next INR in 1 week       Summary  As of 10/12/2023      Full warfarin instructions:  5 mg every day   Next INR check:  10/19/2023               Telephone call with Matt who verbalizes understanding and agrees to plan    Patient to recheck with home meter    Education provided:   Please call back if any changes to your diet, medications or how you've been taking warfarin  Resume manage by exception with home monitor. Continue to submit INR results to home monitor company.You will only be called when your result is out of range. Please call and notify [facility] if new medication started, dose missed, signs or symptoms of bleeding or clotting, or a surgery/procedure is scheduled.  Contact [phone]  with any changes, questions or concerns.     Plan made per [facility] anticoagulation protocol    Carmita Quiles, RN  Anticoagulation Clinic  10/12/2023    _______________________________________________________________________     Anticoagulation Episode Summary       Current INR goal:  2.0-3.0   TTR:  73.9% (1 y)   Target end date:  Indefinite   Send INR reminders to:  [facility] HOME MONITORING    Indications    Thrombophilia (H24)  [D68.59]  Long term (current) use of anticoagulants [Z79.01]  History of pulmonary embolism [Z86.711]             Comments:  [employer] home meter, MBE             Anticoagulation Care Providers       Provider Role Specialty Phone number    Dl Allen MD Referring Family Medicine [phone]

## 2023-10-16 ASSESSMENT — ENCOUNTER SYMPTOMS
HEADACHES: 0
WEAKNESS: 0
ABDOMINAL PAIN: 0
EYE PAIN: 0
HEMATURIA: 0
COUGH: 0
NERVOUS/ANXIOUS: 0
DIARRHEA: 0
PARESTHESIAS: 0
PALPITATIONS: 0
CHILLS: 0
FEVER: 0
DYSURIA: 0
SORE THROAT: 0
ARTHRALGIAS: 0
DIZZINESS: 0
HEMATOCHEZIA: 1
FREQUENCY: 0
SHORTNESS OF BREATH: 0
HEARTBURN: 0
NAUSEA: 0
CONSTIPATION: 0
JOINT SWELLING: 0
MYALGIAS: 0

## 2023-10-16 ASSESSMENT — ACTIVITIES OF DAILY LIVING (ADL): CURRENT_FUNCTION: NO ASSISTANCE NEEDED

## 2023-10-17 ENCOUNTER — OFFICE VISIT (OUTPATIENT)
Dept: FAMILY MEDICINE | Facility: CLINIC | Age: 68
End: 2023-10-17
Attending: FAMILY MEDICINE
Payer: COMMERCIAL

## 2023-10-17 VITALS
DIASTOLIC BLOOD PRESSURE: 80 MMHG | SYSTOLIC BLOOD PRESSURE: 132 MMHG | WEIGHT: 174.2 LBS | TEMPERATURE: 97.5 F | RESPIRATION RATE: 16 BRPM | HEIGHT: 72 IN | HEART RATE: 59 BPM | BODY MASS INDEX: 23.6 KG/M2 | OXYGEN SATURATION: 99 %

## 2023-10-17 DIAGNOSIS — D68.59 THROMBOPHILIA (H): ICD-10-CM

## 2023-10-17 DIAGNOSIS — Z13.1 SCREENING FOR DIABETES MELLITUS: ICD-10-CM

## 2023-10-17 DIAGNOSIS — Z00.00 ENCOUNTER FOR ROUTINE ADULT HEALTH EXAMINATION WITHOUT ABNORMAL FINDINGS: Primary | ICD-10-CM

## 2023-10-17 DIAGNOSIS — E78.00 HYPERCHOLESTEREMIA: ICD-10-CM

## 2023-10-17 DIAGNOSIS — Z12.5 SCREENING FOR PROSTATE CANCER: ICD-10-CM

## 2023-10-17 LAB
ALT SERPL W P-5'-P-CCNC: 20 U/L (ref 0–70)
ANION GAP SERPL CALCULATED.3IONS-SCNC: 8 MMOL/L (ref 7–15)
BUN SERPL-MCNC: 21 MG/DL (ref 8–23)
CALCIUM SERPL-MCNC: 9.1 MG/DL (ref 8.8–10.2)
CHLORIDE SERPL-SCNC: 106 MMOL/L (ref 98–107)
CHOLEST SERPL-MCNC: 230 MG/DL
CREAT SERPL-MCNC: 0.89 MG/DL (ref 0.67–1.17)
DEPRECATED HCO3 PLAS-SCNC: 28 MMOL/L (ref 22–29)
EGFRCR SERPLBLD CKD-EPI 2021: >90 ML/MIN/1.73M2
GLUCOSE SERPL-MCNC: 88 MG/DL (ref 70–99)
HDLC SERPL-MCNC: 59 MG/DL
LDLC SERPL CALC-MCNC: 140 MG/DL
NONHDLC SERPL-MCNC: 171 MG/DL
POTASSIUM SERPL-SCNC: 3.9 MMOL/L (ref 3.4–5.3)
PSA SERPL DL<=0.01 NG/ML-MCNC: 1.82 NG/ML (ref 0–4.5)
SODIUM SERPL-SCNC: 142 MMOL/L (ref 135–145)
TRIGL SERPL-MCNC: 153 MG/DL

## 2023-10-17 PROCEDURE — 80048 BASIC METABOLIC PNL TOTAL CA: CPT | Performed by: FAMILY MEDICINE

## 2023-10-17 PROCEDURE — 99213 OFFICE O/P EST LOW 20 MIN: CPT | Mod: 25 | Performed by: FAMILY MEDICINE

## 2023-10-17 PROCEDURE — 36415 COLL VENOUS BLD VENIPUNCTURE: CPT | Performed by: FAMILY MEDICINE

## 2023-10-17 PROCEDURE — 80061 LIPID PANEL: CPT | Performed by: FAMILY MEDICINE

## 2023-10-17 PROCEDURE — G0103 PSA SCREENING: HCPCS | Performed by: FAMILY MEDICINE

## 2023-10-17 PROCEDURE — 99397 PER PM REEVAL EST PAT 65+ YR: CPT | Performed by: FAMILY MEDICINE

## 2023-10-17 PROCEDURE — 84460 ALANINE AMINO (ALT) (SGPT): CPT | Performed by: FAMILY MEDICINE

## 2023-10-17 ASSESSMENT — ENCOUNTER SYMPTOMS
DIARRHEA: 0
FEVER: 0
HEARTBURN: 0
FREQUENCY: 0
NAUSEA: 0
DIZZINESS: 0
CONSTIPATION: 0
JOINT SWELLING: 0
HEADACHES: 0
ABDOMINAL PAIN: 0
ARTHRALGIAS: 0
WEAKNESS: 0
HEMATURIA: 0
MYALGIAS: 0
EYE PAIN: 0
PALPITATIONS: 0
NERVOUS/ANXIOUS: 0
SHORTNESS OF BREATH: 0
SORE THROAT: 0
DYSURIA: 0
HEMATOCHEZIA: 1
PARESTHESIAS: 0
COUGH: 0
CHILLS: 0

## 2023-10-17 ASSESSMENT — ACTIVITIES OF DAILY LIVING (ADL): CURRENT_FUNCTION: NO ASSISTANCE NEEDED

## 2023-10-17 NOTE — ASSESSMENT & PLAN NOTE
Today's visit is not a Medicare visit.  He patient presents for annual exam.  He was seen in urgent care recently for rib pain which is now better.  We reviewed his CT scan results and incidental findings which showed prostatomegaly.  Nutrition is limited by couple factors.  He tries to be consistent with his vegetable consumption given that he is on warfarin.  His wife is a vegetarian for animal cruelty reasons and he does not get much protein.  Exercise has been limited.  Some decrease in libido.  Some mild erectile dysfunction.  He might benefit from a trial of tadalafil to improve obstructive symptoms lower urinary tract.  Otherwise health is stable.  Incidental finding on CT scan was no calcifications in the coronary vasculature.  We talked about heart disease risk.  Check lipids today.  I think his 10-year risk is calculated actually overstates his overall risk given his lack of calcifications.  We will request records to clarify vaccine.  He is received at least 1 pneumonia vaccine (pneumococcal 23?).  He also received a tetanus booster over the summer as he stepped on a nail.  If we are unable to clarify which pneumonia vaccine he received in 2021 we will simply proceed with the PCV 20 Valent.

## 2023-10-17 NOTE — PROGRESS NOTES
"SUBJECTIVE:   CC: Matt is an 68 year old who presents for preventative health visit.       10/17/2023     6:53 AM   Additional Questions   Roomed by XL     Chief Complaint   Patient presents with    Physical     Rib is now feeling better.  Tylenol helped.  Reviewed CT scans.     Limited veggies.  Doing okay on warfarin.  Established with Dr. Raygoza.     Exercise: walks during the week. Stamina stable.  Wonders about strength training.     Nutrition: wife is a vegetarian.     Works 40-60 hours per week.      Healthy Habits:     In general, how would you rate your overall health?  Good    Frequency of exercise:  2-3 days/week    Duration of exercise:  15-30 minutes    Do you usually eat at least 4 servings of fruit and vegetables a day, include whole grains    & fiber and avoid regularly eating high fat or \"junk\" foods?  No    Taking medications regularly:  Yes    Medication side effects:  Other    Ability to successfully perform activities of daily living:  No assistance needed    Home Safety:  No safety concerns identified    Hearing Impairment:  Difficulty following a conversation in a noisy restaurant or crowded room    In the past 6 months, have you been bothered by leaking of urine?  No    In general, how would you rate your overall mental or emotional health?  Good    Additional concerns today:  Yes    Today's PHQ-2 Score:       10/16/2023     9:56 AM   PHQ-2 ( 1999 [employer])   Q1: Little interest or pleasure in doing things 1   Q2: Feeling down, depressed or hopeless 1   PHQ-2 Score 2   Q1: Little interest or pleasure in doing things Several days   Q2: Feeling down, depressed or hopeless Several days   PHQ-2 Score 2     Social History     Tobacco Use    Smoking status: Never    Smokeless tobacco: Never   Substance Use Topics    Alcohol use: Yes     Comment: 2 drinks per week         10/16/2023     9:56 AM   Alcohol Use   Prescreen: >3 drinks/day or >7 drinks/week? No       Last PSA:   Prostate Specific Antigen " "Screen   Date Value Ref Range Status   10/17/2022 1.82 0.00 - 4.50 ng/mL Final   10/12/2021 1.54 0.00 - 4.50 ug/L Final       Reviewed orders with patient. Reviewed health maintenance and updated orders accordingly - Yes    Reviewed and updated as needed this visit by clinical staff   Tobacco  Allergies  Meds  Problems  Med Hx  Surg Hx  Fam Hx          Reviewed and updated as needed this visit by Provider   Tobacco  Allergies  Meds  Problems  Med Hx  Surg Hx  Fam Hx             Review of Systems   Constitutional:  Negative for chills and fever.   HENT:  Negative for congestion, ear pain, hearing loss and sore throat.    Eyes:  Positive for visual disturbance. Negative for pain.   Respiratory:  Negative for cough and shortness of breath.    Cardiovascular:  Negative for chest pain, palpitations and peripheral edema.   Gastrointestinal:  Positive for hematochezia. Negative for abdominal pain, constipation, diarrhea, heartburn and nausea.   Genitourinary:  Positive for impotence. Negative for dysuria, frequency, genital sores, hematuria, penile discharge and urgency.   Musculoskeletal:  Negative for arthralgias, joint swelling and myalgias.   Skin:  Negative for rash.   Neurological:  Negative for dizziness, weakness, headaches and paresthesias.   Psychiatric/Behavioral:  Negative for mood changes. The patient is not nervous/anxious.      The 10-year ASCVD risk score (Ranulfo DK, et al., 2019) is: 14.4%    Values used to calculate the score:      Age: 68 years      Sex: Male      Is Non- : No      Diabetic: No      Tobacco smoker: No      Systolic Blood Pressure: 132 mmHg      Is BP treated: No      HDL Cholesterol: 61 mg/dL      Total Cholesterol: 209 mg/dL      OBJECTIVE:   /80 (BP Location: Left arm, Patient Position: Sitting, Cuff Size: Adult Regular)   Pulse 59   Temp 97.5  F (36.4  C) (Oral)   Resp 16   Ht 1.816 m (5' 11.5\")   Wt 79 kg (174 lb 3.2 oz)   SpO2 99%   " BMI 23.96 kg/m      Physical Exam  Vitals reviewed.   Constitutional:       General: He is not in acute distress.     Appearance: Normal appearance. He is not ill-appearing.   HENT:      Head: Normocephalic and atraumatic.      Right Ear: External ear normal.      Left Ear: External ear normal.      Nose: Nose normal.      Mouth/Throat:      Pharynx: Oropharynx is clear. No oropharyngeal exudate or posterior oropharyngeal erythema.   Eyes:      General: No scleral icterus.        Right eye: No discharge.         Left eye: No discharge.      Extraocular Movements: Extraocular movements intact.      Conjunctiva/sclera: Conjunctivae normal.      Pupils: Pupils are equal, round, and reactive to light.   Neck:      Comments: No thyromegaly.  Cardiovascular:      Rate and Rhythm: Normal rate and regular rhythm.      Heart sounds: Normal heart sounds. No murmur heard.     No friction rub. No gallop.   Pulmonary:      Effort: Pulmonary effort is normal. No respiratory distress.      Breath sounds: Normal breath sounds. No wheezing or rales.   Abdominal:      General: There is no distension.      Palpations: Abdomen is soft. There is no mass.      Tenderness: There is no abdominal tenderness.   Musculoskeletal:         General: No signs of injury. Normal range of motion.      Cervical back: Normal range of motion.      Right lower leg: No edema.      Left lower leg: No edema.   Lymphadenopathy:      Cervical: No cervical adenopathy.   Skin:     General: Skin is warm.      Coloration: Skin is not jaundiced.      Findings: No rash.   Neurological:      General: No focal deficit present.      Mental Status: He is alert and oriented to person, place, and time.      Cranial Nerves: No cranial nerve deficit.      Deep Tendon Reflexes: Reflexes normal.   Psychiatric:         Mood and Affect: Mood normal.         ASSESSMENT/PLAN:     Problem List Items Addressed This Visit       Encounter for routine adult health examination without  abnormal findings - Primary     Today's visit is not a Medicare visit.  He patient presents for annual exam.  He was seen in urgent care recently for rib pain which is now better.  We reviewed his CT scan results and incidental findings which showed prostatomegaly.  Nutrition is limited by couple factors.  He tries to be consistent with his vegetable consumption given that he is on warfarin.  His wife is a vegetarian for animal cruelty reasons and he does not get much protein.  Exercise has been limited.  Some decrease in libido.  Some mild erectile dysfunction.  He might benefit from a trial of tadalafil to improve obstructive symptoms lower urinary tract.  Otherwise health is stable.  Incidental finding on CT scan was no calcifications in the coronary vasculature.  We talked about heart disease risk.  Check lipids today.  I think his 10-year risk is calculated actually overstates his overall risk given his lack of calcifications.  We will request records to clarify vaccine.  He is received at least 1 pneumonia vaccine (pneumococcal 23?).  He also received a tetanus booster over the summer as he stepped on a nail.  If we are unable to clarify which pneumonia vaccine he received in 2021 we will simply proceed with the PCV 20 Valent.         Thrombophilia (H24)     Follows with hematology.  No issues at this time.          Other Visit Diagnoses       Screening for diabetes mellitus        Relevant Orders    Basic metabolic panel  (Ca, Cl, CO2, Creat, Gluc, K, Na, BUN)    ALT    Lipid panel reflex to direct LDL Fasting    Hypercholesteremia        Relevant Orders    Lipid panel reflex to direct LDL Fasting    Screening for prostate cancer        Relevant Orders    PSA, screen            Patient has been advised of split billing requirements and indicates understanding: Yes      COUNSELING:   Reviewed preventive health counseling, as reflected in patient instructions       Regular exercise       Healthy  diet/nutrition    He reports that he has never smoked. He has never used smokeless tobacco.          Dl Allen MD  [facility]

## 2023-10-24 ENCOUNTER — ANTICOAGULATION THERAPY VISIT (OUTPATIENT)
Dept: ANTICOAGULATION | Facility: CLINIC | Age: 68
End: 2023-10-24
Payer: COMMERCIAL

## 2023-10-24 DIAGNOSIS — Z86.711 HISTORY OF PULMONARY EMBOLISM: ICD-10-CM

## 2023-10-24 DIAGNOSIS — Z79.01 LONG TERM (CURRENT) USE OF ANTICOAGULANTS: ICD-10-CM

## 2023-10-24 DIAGNOSIS — D68.59 THROMBOPHILIA (H): Primary | ICD-10-CM

## 2023-10-24 LAB — INR HOME MONITORING: 3.7 RATIO (ref 2–3)

## 2023-10-24 NOTE — PROGRESS NOTES
ANTICOAGULATION MANAGEMENT     Matt Rodriguez 68 year old male is on warfarin with supratherapeutic INR result. (Goal INR 2.0-3.0)    Recent labs: (last 7 days)     10/24/23  0748   INR 3.7*       ASSESSMENT     Source(s): Chart Review  Previous INR was Therapeutic last visit; previously outside of goal range  Medication, diet, health changes since last INR chart reviewed; none identified         PLAN     Unable to reach Matt today.    Left message to take reduced dose of warfarin, 2.5 mg tonight. Request call back for assessment.    Follow up required to confirm warfarin dose taken and assess for changes    Carmita Quiles, RN  Anticoagulation Clinic  10/24/2023

## 2023-10-24 NOTE — PROGRESS NOTES
ANTICOAGULATION MANAGEMENT     Matt Rodriguez 68 year old male is on warfarin with supratherapeutic INR result. (Goal INR 2.0-3.0)    Recent labs: (last 7 days)     10/24/23  0748   INR 3.7*       ASSESSMENT     Source(s): Chart Review and Patient/Caregiver Call     Warfarin doses taken: Warfarin taken as instructed  Diet: Decreased greens/vitamin K in diet; plans to resume previous intake  Medication/supplement changes: None noted  New illness, injury, or hospitalization: No  Signs or symptoms of bleeding or clotting: No  Previous result: Therapeutic last visit; previously outside of goal range  Additional findings: None       PLAN     Recommended plan for temporary change(s) affecting INR     Dosing Instructions: partial hold then continue your current warfarin dose with next INR in 1 week       Summary  As of 10/24/2023      Full warfarin instructions:  10/24: 2.5 mg; Otherwise 5 mg every day   Next INR check:  11/2/2023               Telephone call with Matt who verbalizes understanding and agrees to plan    Patient to recheck with home meter    Education provided:   Dietary considerations: importance of consistent vitamin K intake    Plan made per [facility] anticoagulation protocol    Carmita Quiles, RN  Anticoagulation Clinic  10/24/2023    _______________________________________________________________________     Anticoagulation Episode Summary       Current INR goal:  2.0-3.0   TTR:  74.6% (1 y)   Target end date:  Indefinite   Send INR reminders to:  ANTICOAG HOME MONITORING    Indications    Thrombophilia (H24) [D68.59]  Long term (current) use of anticoagulants [Z79.01]  History of pulmonary embolism [Z86.711]             Comments:  [employer] home meter, MBE             Anticoagulation Care Providers       Provider Role Specialty Phone number    Dl Allen MD Referring Family Medicine [phone]

## 2023-10-31 ENCOUNTER — ANTICOAGULATION THERAPY VISIT (OUTPATIENT)
Dept: ANTICOAGULATION | Facility: CLINIC | Age: 68
End: 2023-10-31
Payer: COMMERCIAL

## 2023-10-31 DIAGNOSIS — Z79.01 LONG TERM (CURRENT) USE OF ANTICOAGULANTS: ICD-10-CM

## 2023-10-31 DIAGNOSIS — D68.59 THROMBOPHILIA (H): Primary | ICD-10-CM

## 2023-10-31 DIAGNOSIS — Z86.711 HISTORY OF PULMONARY EMBOLISM: ICD-10-CM

## 2023-10-31 LAB — INR HOME MONITORING: 2.5 RATIO (ref 2–3)

## 2023-10-31 NOTE — PROGRESS NOTES
ANTICOAGULATION MANAGEMENT     Matt Rodriguez 68 year old male is on warfarin with therapeutic INR result. (Goal INR 2.0-3.0)    Recent labs: (last 7 days)     10/31/23  1105   INR 2.5       ASSESSMENT     Source(s): Chart Review and Patient/Caregiver Call     Warfarin doses taken: Warfarin taken as instructed  Diet: back to normal Green intake   medication/supplement changes: None noted  New illness, injury, or hospitalization: No  Signs or symptoms of bleeding or clotting: No  Previous result: Supratherapeutic  Additional findings: None       PLAN     Recommended plan for no diet, medication or health factor changes affecting INR     Dosing Instructions: Continue your current warfarin dose with next INR in 1 week       Summary  As of 10/31/2023      Full warfarin instructions:  5 mg every day   Next INR check:  11/7/2023               Telephone call with Matt who verbalizes understanding and agrees to plan and who agrees to plan and repeated back plan correctly    Patient to recheck with home meter    Education provided:   Resume manage by exception with home monitor. Continue to submit INR results to home monitor company.You will only be called when your result is out of range. Please call and notify [facility] if new medication started, dose missed, signs or symptoms of bleeding or clotting, or a surgery/procedure is scheduled.    Plan made per [facility] anticoagulation protocol    Virginia Benitez RN  Anticoagulation Clinic  10/31/2023    _______________________________________________________________________     Anticoagulation Episode Summary       Current INR goal:  2.0-3.0   TTR:  74.6% (1 y)   Target end date:  Indefinite   Send INR reminders to:  [facility]AG HOME MONITORING    Indications    Thrombophilia (H24) [D68.59]  Long term (current) use of anticoagulants [Z79.01]  History of pulmonary embolism [Z86.711]             Comments:  [employer] home meter, MBE             Anticoagulation Care Providers       Provider Role  Specialty Phone number    Dl Allen MD Referring Family Medicine [phone]

## 2023-11-07 ENCOUNTER — ANTICOAGULATION THERAPY VISIT (OUTPATIENT)
Dept: ANTICOAGULATION | Facility: CLINIC | Age: 68
End: 2023-11-07
Payer: COMMERCIAL

## 2023-11-07 DIAGNOSIS — D68.59 THROMBOPHILIA (H): Primary | ICD-10-CM

## 2023-11-07 DIAGNOSIS — Z79.01 LONG TERM (CURRENT) USE OF ANTICOAGULANTS: ICD-10-CM

## 2023-11-07 DIAGNOSIS — Z86.711 HISTORY OF PULMONARY EMBOLISM: ICD-10-CM

## 2023-11-07 LAB — INR HOME MONITORING: 3.3 RATIO (ref 2–3)

## 2023-11-07 NOTE — PROGRESS NOTES
ANTICOAGULATION MANAGEMENT     Matt Rodriguez 68 year old male is on warfarin with supratherapeutic INR result. (Goal INR 2.0-3.0)    Recent labs: (last 7 days)     11/07/23  0706   INR 3.3*       ASSESSMENT     Source(s): Chart Review and Patient/Caregiver Call     Warfarin doses taken: Warfarin taken as instructed  Diet:  reported that he was eating beer bread  Medication/supplement changes: None noted  New illness, injury, or hospitalization: No  Signs or symptoms of bleeding or clotting: No  Previous result: Therapeutic last visit; previously outside of goal range  Additional findings: None       PLAN     Recommended plan for temporary change(s) affecting INR     Dosing Instructions: decrease your warfarin dose (7% change) with next INR in 1 week       Summary  As of 11/7/2023      Full warfarin instructions:  2.5 mg every Tue; 5 mg all other days   Next INR check:  11/14/2023               Telephone call with Matt who verbalizes understanding and agrees to plan    Patient to recheck with home meter    Education provided:   Dietary considerations: importance of consistent vitamin K intake and vitamin K content of foods  Contact [phone]  with any changes, questions or concerns.     Plan made per [facility] anticoagulation protocol    Zaida Gamez RN  Anticoagulation Clinic  11/7/2023    _______________________________________________________________________     Anticoagulation Episode Summary       Current INR goal:  2.0-3.0   TTR:  75.5% (1 y)   Target end date:  Indefinite   Send INR reminders to:  ANTICOAG HOME MONITORING    Indications    Thrombophilia (H24) [D68.59]  Long term (current) use of anticoagulants [Z79.01]  History of pulmonary embolism [Z86.711]             Comments:  [employer] home meter, MBE             Anticoagulation Care Providers       Provider Role Specialty Phone number    Dl Allen MD Referring Family Medicine [phone]

## 2023-11-14 ENCOUNTER — ANTICOAGULATION THERAPY VISIT (OUTPATIENT)
Dept: ANTICOAGULATION | Facility: CLINIC | Age: 68
End: 2023-11-14
Payer: COMMERCIAL

## 2023-11-14 DIAGNOSIS — Z79.01 LONG TERM (CURRENT) USE OF ANTICOAGULANTS: ICD-10-CM

## 2023-11-14 DIAGNOSIS — D68.59 THROMBOPHILIA (H): Primary | ICD-10-CM

## 2023-11-14 DIAGNOSIS — Z86.711 HISTORY OF PULMONARY EMBOLISM: ICD-10-CM

## 2023-11-14 LAB — INR HOME MONITORING: 2.6 RATIO (ref 2–3)

## 2023-11-14 NOTE — PROGRESS NOTES
ANTICOAGULATION MANAGEMENT     Matt Rodriguez 68 year old male is on warfarin with therapeutic INR result. (Goal INR 2.0-3.0)    Recent labs: (last 7 days)     11/14/23  0830   INR 2.6       ASSESSMENT     Source(s): Chart Review  Previous INR was Supratherapeutic  Medication, diet, health changes since last INR chart reviewed; none identified         PLAN     Recommended plan for no diet, medication or health factor changes affecting INR     Dosing Instructions: Continue your current warfarin dose with next INR in 1 week       Summary  As of 11/14/2023      Full warfarin instructions:  2.5 mg every Tue; 5 mg all other days   Next INR check:  11/21/2023               Detailed voice message left for Matt with dosing instructions and follow up date.     Patient to recheck with home meter    Education provided:   Please call back if any changes to your diet, medications or how you've been taking warfarin  Resume manage by exception with home monitor. Continue to submit INR results to home monitor company.You will only be called when your result is out of range. Please call and notify [facility] if new medication started, dose missed, signs or symptoms of bleeding or clotting, or a surgery/procedure is scheduled.  Contact [phone]  with any changes, questions or concerns.     Plan made per [facility] anticoagulation protocol    Carmita Quiles, RN  Anticoagulation Clinic  11/14/2023    _______________________________________________________________________     Anticoagulation Episode Summary       Current INR goal:  2.0-3.0   TTR:  74.7% (1 y)   Target end date:  Indefinite   Send INR reminders to:  ANTICOAG HOME MONITORING    Indications    Thrombophilia (H24) [D68.59]  Long term (current) use of anticoagulants [Z79.01]  History of pulmonary embolism [Z86.711]             Comments:  [employer] home meter, [place]             Anticoagulation Care Providers       Provider Role Specialty Phone number    Dl Allen MD Referring  Family Medicine [phone]

## 2023-11-21 ENCOUNTER — DOCUMENTATION ONLY (OUTPATIENT)
Dept: ANTICOAGULATION | Facility: CLINIC | Age: 68
End: 2023-11-21
Payer: COMMERCIAL

## 2023-11-21 DIAGNOSIS — Z79.01 LONG TERM (CURRENT) USE OF ANTICOAGULANTS: ICD-10-CM

## 2023-11-21 DIAGNOSIS — Z86.711 HISTORY OF PULMONARY EMBOLISM: ICD-10-CM

## 2023-11-21 DIAGNOSIS — D68.59 THROMBOPHILIA (H): Primary | ICD-10-CM

## 2023-11-21 LAB — INR HOME MONITORING: 2.5 RATIO (ref 2–3)

## 2023-11-21 NOTE — PROGRESS NOTES
ANTICOAGULATION  MANAGEMENT-Home Monitor Managed by Exception    Matt Rodriguez 68 year old male is on warfarin with therapeutic INR result. (Goal INR 2.0-3.0)    Recent labs: (last 7 days)     11/21/23  0855   INR 2.5       Previous INR was Therapeutic  Medication, diet, health changes since last INR:chart reviewed; none identified  Contacted within the last 12 weeks by phone on 11/14/23      LISA Howellian was NOT contacted regarding therapeutic result today per home monitoring policy manage by exception agreement.   Current warfarin dose is to be continued:     Summary  As of 11/21/2023      Full warfarin instructions:  2.5 mg every Tue; 5 mg all other days   Next INR check:  11/28/2023             ?   Ketty Myers RN  Anticoagulation Clinic  11/21/2023    _______________________________________________________________________     Anticoagulation Episode Summary       Current INR goal:  2.0-3.0   TTR:  75.6% (1 y)   Target end date:  Indefinite   Send INR reminders to:  ANTICOGUILHERME HOME MONITORING    Indications    Thrombophilia (H24) [D68.59]  Long term (current) use of anticoagulants [Z79.01]  History of pulmonary embolism [Z86.711]             Comments:  [employer] home meter, MBE             Anticoagulation Care Providers       Provider Role Specialty Phone number    Dl Allen MD Referring Family Medicine [phone]

## 2023-11-28 ENCOUNTER — DOCUMENTATION ONLY (OUTPATIENT)
Dept: ANTICOAGULATION | Facility: CLINIC | Age: 68
End: 2023-11-28
Payer: COMMERCIAL

## 2023-11-28 DIAGNOSIS — Z86.711 HISTORY OF PULMONARY EMBOLISM: ICD-10-CM

## 2023-11-28 DIAGNOSIS — D68.59 THROMBOPHILIA (H): Primary | ICD-10-CM

## 2023-11-28 DIAGNOSIS — Z79.01 LONG TERM (CURRENT) USE OF ANTICOAGULANTS: ICD-10-CM

## 2023-11-28 LAB — INR HOME MONITORING: 2.9 RATIO (ref 2–3)

## 2023-11-28 NOTE — PROGRESS NOTES
ANTICOAGULATION  MANAGEMENT-Home Monitor Managed by Exception    Matt CROSS Marjoriecristine 68 year old male is on warfarin with therapeutic INR result. (Goal INR 2.0-3.0)    Recent labs: (last 7 days)     11/28/23  0937   INR 2.9       Previous INR was Therapeutic  Medication, diet, health changes since last INR:chart reviewed; none identified  Contacted within the last 12 weeks by phone on 11/14/2023      LISA     Matt was NOT contacted regarding therapeutic result today per home monitoring policy manage by exception agreement.   Current warfarin dose is to be continued:     Summary  As of 11/28/2023      Full warfarin instructions:  2.5 mg every Tue; 5 mg all other days   Next INR check:  12/5/2023             ?   Carmita Quiles RN  Anticoagulation Clinic  11/28/2023    _______________________________________________________________________     Anticoagulation Episode Summary       Current INR goal:  2.0-3.0   TTR:  75.6% (1 y)   Target end date:  Indefinite   Send INR reminders to:  ANTICOGUILHERME HOME MONITORING    Indications    Thrombophilia (H24) [D68.59]  Long term (current) use of anticoagulants [Z79.01]  History of pulmonary embolism [Z86.711]             Comments:  [employer] home meter, MBE             Anticoagulation Care Providers       Provider Role Specialty Phone number    Dl Allen MD Referring Family Medicine [phone]

## 2023-12-05 ENCOUNTER — DOCUMENTATION ONLY (OUTPATIENT)
Dept: ANTICOAGULATION | Facility: CLINIC | Age: 68
End: 2023-12-05
Payer: COMMERCIAL

## 2023-12-05 LAB — INR HOME MONITORING: 2 RATIO (ref 2–3)

## 2023-12-05 NOTE — PROGRESS NOTES
ANTICOAGULATION  MANAGEMENT-Home Monitor Managed by Exception    Matt TAY Rodriguez 68 year old male is on warfarin with therapeutic INR result. (Goal INR 2.0-3.0)    Recent labs: (last 7 days)     12/05/23  0906   INR 2       Previous INR was Therapeutic  Medication, diet, health changes since last INR:chart reviewed; none identified  Contacted within the last 12 weeks by phone on 11/14/23  Last [facility] referral date: 09/15/2023      LISA     Matt was NOT contacted regarding therapeutic result today per home monitoring policy manage by exception agreement.   Current warfarin dose is to be continued:     Summary  As of 12/5/2023      Full warfarin instructions:  2.5 mg every Tue; 5 mg all other days   Next INR check:  12/12/2023             ?   Ashley Gillespie RN  Anticoagulation Clinic  12/5/2023    _______________________________________________________________________     Anticoagulation Episode Summary       Current INR goal:  2.0-3.0   TTR:  75.6% (1 y)   Target end date:  Indefinite   Send INR reminders to:  ANTICOGU[facility]HERME HOME MONITORING    Indications    Thrombophilia (H24) [D68.59]  Long term (current) use of anticoagulants [Z79.01]  History of pulmonary embolism [Z86.711]             Comments:  [employer] home meter, MBE             Anticoagulation Care Providers       Provider Role Specialty Phone number    Dl Allen MD Referring Family Medicine [phone]

## 2023-12-07 DIAGNOSIS — D68.59 THROMBOPHILIA (H): ICD-10-CM

## 2023-12-07 RX ORDER — WARFARIN SODIUM 5 MG/1
TABLET ORAL
Qty: 90 TABLET | Refills: 1 | Status: SHIPPED | OUTPATIENT
Start: 2023-12-07 | End: 2024-05-02

## 2023-12-07 NOTE — TELEPHONE ENCOUNTER
ANTICOAGULATION MANAGEMENT:  Medication Refill    Anticoagulation Summary  As of 12/5/2023      Warfarin maintenance plan:  2.5 mg (5 mg x 0.5) every Tue; 5 mg (5 mg x 1) all other days   Next INR check:  12/12/2023   Target end date:  Indefinite    Indications    Thrombophilia (H24) [D68.59]  Long term (current) use of anticoagulants [Z79.01]  History of pulmonary embolism [Z86.711]                 Anticoagulation Care Providers       Provider Role Specialty Phone number    Dl Allen MD Referring Family Medicine [phone]            Refill Criteria    Visit with referring provider/group: Meets criteria: office visit within referring provider group in the last 1 year on 10/17/23    [facility] referral signed last signed: 09/15/2023; within last year: Yes    Lab monitoring not exceeding 2 weeks overdue: Yes    Matt meets all criteria for refill. Rx instructions and quantity supplied updated to match patient's current dosing plan. Warfarin 90 day supply with 1 refill granted per [facility] protocol     Danii Abdul RN  Anticoagulation Clinic

## 2023-12-13 ENCOUNTER — DOCUMENTATION ONLY (OUTPATIENT)
Dept: ANTICOAGULATION | Facility: CLINIC | Age: 68
End: 2023-12-13
Payer: COMMERCIAL

## 2023-12-13 DIAGNOSIS — Z79.01 LONG TERM (CURRENT) USE OF ANTICOAGULANTS: ICD-10-CM

## 2023-12-13 DIAGNOSIS — Z86.711 HISTORY OF PULMONARY EMBOLISM: ICD-10-CM

## 2023-12-13 DIAGNOSIS — D68.59 THROMBOPHILIA (H): Primary | ICD-10-CM

## 2023-12-13 LAB — INR HOME MONITORING: 2.2 RATIO (ref 2–3)

## 2023-12-13 NOTE — PROGRESS NOTES
ANTICOAGULATION  MANAGEMENT-Home Monitor Managed by Exception    Matt CROSS Michael 68 year old male is on warfarin with therapeutic INR result. (Goal INR 2.0-3.0)    Recent labs: (last 7 days)     12/13/23  0910   INR 2.2       Previous INR was Therapeutic  Medication, diet, health changes since last INR:chart reviewed; none identified  Contacted within the last 12 weeks by phone on 11/14/23  Last [facility] referral date: 09/15/2023      LISA Gutierrez was NOT contacted regarding therapeutic result today per home monitoring policy manage by exception agreement.   Current warfarin dose is to be continued:     Summary  As of 12/13/2023      Full warfarin instructions:  2.5 mg every Tue; 5 mg all other days   Next INR check:  12/20/2023             ?   Zaida Gamez RN  Anticoagulation Clinic  12/13/2023    _______________________________________________________________________     Anticoagulation Episode Summary       Current INR goal:  2.0-3.0   TTR:  75.6% (1 y)   Target end date:  Indefinite   Send INR reminders to:  ANT[facility]OGU[facility]ME HOME MONITORING    Indications    Thrombophilia (H24) [D68.59]  Long term (current) use of anticoagulants [Z79.01]  History of pulmonary embolism [Z86.711]             Comments:  [employer] home meter, MBE             Anticoagulation Care Providers       Provider Role Specialty Phone number    Dl Allen MD Referring Family Medicine [phone]

## 2023-12-16 ENCOUNTER — MYC MEDICAL ADVICE (OUTPATIENT)
Dept: FAMILY MEDICINE | Facility: CLINIC | Age: 68
End: 2023-12-16
Payer: COMMERCIAL

## 2023-12-19 ENCOUNTER — DOCUMENTATION ONLY (OUTPATIENT)
Dept: ANTICOAGULATION | Facility: CLINIC | Age: 68
End: 2023-12-19
Payer: COMMERCIAL

## 2023-12-19 DIAGNOSIS — Z79.01 LONG TERM (CURRENT) USE OF ANTICOAGULANTS: ICD-10-CM

## 2023-12-19 DIAGNOSIS — Z86.711 HISTORY OF PULMONARY EMBOLISM: ICD-10-CM

## 2023-12-19 DIAGNOSIS — D68.59 THROMBOPHILIA (H): Primary | ICD-10-CM

## 2023-12-19 LAB — INR HOME MONITORING: 2.6 RATIO (ref 2–3)

## 2023-12-19 NOTE — PROGRESS NOTES
ANTICOAGULATION  MANAGEMENT-Home Monitor Managed by Exception    Matt CROSS Michael 68 year old male is on warfarin with therapeutic INR result. (Goal INR 2.0-3.0)    Recent labs: (last 7 days)     12/19/23  0941   INR 2.6       Previous INR was Therapeutic  Medication, diet, health changes since last INR:chart reviewed; none identified  Contacted within the last 12 weeks by phone on 11/14/23  Last [facility] referral date: 09/15/2023      LISA Howellian was NOT contacted regarding therapeutic result today per home monitoring policy manage by exception agreement.   Current warfarin dose is to be continued:     Summary  As of 12/19/2023      Full warfarin instructions:  2.5 mg every Tue; 5 mg all other days   Next INR check:  12/26/2023             ?   Virginia Benitez RN  Anticoagulation Clinic  12/19/2023    _______________________________________________________________________     Anticoagulation Episode Summary       Current INR goal:  2.0-3.0   TTR:  75.6% (1 y)   Target end date:  Indefinite   Send INR reminders to:  [employer]    Indications    Thrombophilia (H24) [D68.59]  Long term (current) use of anticoagulants [Z79.01]  History of pulmonary embolism [Z86.711]             Comments:  [employer] home meter, MBE             Anticoagulation Care Providers       Provider Role Specialty Phone number    Dl Allen MD Referring Family Medicine [phone]

## 2023-12-26 ENCOUNTER — DOCUMENTATION ONLY (OUTPATIENT)
Dept: ANTICOAGULATION | Facility: CLINIC | Age: 68
End: 2023-12-26
Payer: COMMERCIAL

## 2023-12-26 DIAGNOSIS — D68.59 THROMBOPHILIA (H): Primary | ICD-10-CM

## 2023-12-26 DIAGNOSIS — Z86.711 HISTORY OF PULMONARY EMBOLISM: ICD-10-CM

## 2023-12-26 DIAGNOSIS — Z79.01 LONG TERM (CURRENT) USE OF ANTICOAGULANTS: ICD-10-CM

## 2023-12-26 LAB — INR HOME MONITORING: 2.5 RATIO (ref 2–3)

## 2023-12-26 NOTE — PROGRESS NOTES
ANTICOAGULATION  MANAGEMENT-Home Monitor Managed by Exception    Matt CROSS Marjoriecristine 68 year old male is on warfarin with therapeutic INR result. (Goal INR 2.0-3.0)    Recent labs: (last 7 days)     12/26/23  0840   INR 2.5       Previous INR was Therapeutic  Medication, diet, health changes since last INR:chart reviewed; none identified  Contacted within the last 12 weeks by phone on 11/14/23  Last [facility] referral date: 09/15/2023      LISA Howellian was NOT contacted regarding therapeutic result today per home monitoring policy manage by exception agreement.   Current warfarin dose is to be continued:     Summary  As of 12/26/2023      Full warfarin instructions:  2.5 mg every Tue; 5 mg all other days   Next INR check:  1/2/2024             ?   Zaida Nevarez RN  Anticoagulation Clinic  12/26/2023    _______________________________________________________________________     Anticoagulation Episode Summary       Current INR goal:  2.0-3.0   TTR:  75.6% (1 y)   Target end date:  Indefinite   Send INR reminders to:  [facility]    Indications    Thrombophilia (H24) [D68.59]  Long term (current) use of anticoagulants [Z79.01]  History of pulmonary embolism [Z86.711]             Comments:  [employer] home meter, MBE             Anticoagulation Care Providers       Provider Role Specialty Phone number    Dl Allen MD Referring Family Medicine [phone]

## 2024-01-03 ENCOUNTER — DOCUMENTATION ONLY (OUTPATIENT)
Dept: ANTICOAGULATION | Facility: CLINIC | Age: 69
End: 2024-01-03
Payer: COMMERCIAL

## 2024-01-03 DIAGNOSIS — Z79.01 LONG TERM (CURRENT) USE OF ANTICOAGULANTS: ICD-10-CM

## 2024-01-03 DIAGNOSIS — Z86.711 HISTORY OF PULMONARY EMBOLISM: ICD-10-CM

## 2024-01-03 DIAGNOSIS — D68.59 THROMBOPHILIA (H): Primary | ICD-10-CM

## 2024-01-03 LAB — INR HOME MONITORING: 2.3 RATIO (ref 2–3)

## 2024-01-03 NOTE — PROGRESS NOTES
ANTICOAGULATION  MANAGEMENT-Home Monitor Managed by Exception    Matt TAY Rodriguez 68 year old male is on warfarin with therapeutic INR result. (Goal INR 2.0-3.0)    Recent labs: (last 7 days)     01/03/24  0748   INR 2.3       Previous INR was Therapeutic  Medication, diet, health changes since last INR:chart reviewed; none identified  Contacted within the last 12 weeks by phone on 11/14/23   Last [facility] referral date: 09/15/2023      LISA Howellian was NOT contacted regarding therapeutic result today per home monitoring policy manage by exception agreement.   Current warfarin dose is to be continued:     Summary  As of 1/3/2024      Full warfarin instructions:  2.5 mg every Tue; 5 mg all other days   Next INR check:  1/10/2024             ?   Jany Mast RN  Anticoagulation Clinic  1/3/2024    _______________________________________________________________________     Anticoagulation Episode Summary       Current INR goal:  2.0-3.0   TTR:  75.6% (1 y)   Target end date:  Indefinite   Send INR reminders to:  [facility]    Indications    Thrombophilia (H24) [D68.59]  Long term (current) use of anticoagulants [Z79.01]  History of pulmonary embolism [Z86.711]             Comments:  [employer] home meter, MBE             Anticoagulation Care Providers       Provider Role Specialty Phone number    Dl Allen MD Referring Family Medicine [phone]

## 2024-01-08 ENCOUNTER — MYC MEDICAL ADVICE (OUTPATIENT)
Dept: FAMILY MEDICINE | Facility: CLINIC | Age: 69
End: 2024-01-08

## 2024-01-08 ENCOUNTER — VIRTUAL VISIT (OUTPATIENT)
Dept: HEMATOLOGY | Facility: CLINIC | Age: 69
End: 2024-01-08
Attending: INTERNAL MEDICINE
Payer: COMMERCIAL

## 2024-01-08 DIAGNOSIS — I43 FAMILIAL AMYLOID HEART DISEASE (H): Primary | ICD-10-CM

## 2024-01-08 DIAGNOSIS — Z79.01 CURRENT USE OF LONG TERM ANTICOAGULATION: ICD-10-CM

## 2024-01-08 DIAGNOSIS — E85.4 FAMILIAL AMYLOID HEART DISEASE (H): Primary | ICD-10-CM

## 2024-01-08 DIAGNOSIS — Z86.711 HISTORY OF PULMONARY EMBOLISM: Primary | ICD-10-CM

## 2024-01-08 PROCEDURE — 99214 OFFICE O/P EST MOD 30 MIN: CPT | Mod: 95 | Performed by: INTERNAL MEDICINE

## 2024-01-08 NOTE — PROGRESS NOTES
Patient was contacted to complete the pre-visit call prior to their telephone visit with the provider.     Allergies and medications were reviewed.     I thanked them for their time to cover this information.     Carmencita Dyson MA

## 2024-01-09 ENCOUNTER — DOCUMENTATION ONLY (OUTPATIENT)
Dept: ANTICOAGULATION | Facility: CLINIC | Age: 69
End: 2024-01-09
Payer: COMMERCIAL

## 2024-01-09 DIAGNOSIS — D68.59 THROMBOPHILIA (H): Primary | ICD-10-CM

## 2024-01-09 DIAGNOSIS — Z86.711 HISTORY OF PULMONARY EMBOLISM: ICD-10-CM

## 2024-01-09 DIAGNOSIS — Z79.01 LONG TERM (CURRENT) USE OF ANTICOAGULANTS: ICD-10-CM

## 2024-01-09 LAB — INR HOME MONITORING: 2.8 RATIO (ref 2–3)

## 2024-01-09 NOTE — PROGRESS NOTES
ANTICOAGULATION  MANAGEMENT-Home Monitor Managed by Exception    Matt TAY Rodriguez 68 year old male is on warfarin with therapeutic INR result. (Goal INR 2.0-3.0)    Recent labs: (last 7 days)     01/09/24  0734   INR 2.8       Previous INR was Therapeutic  Medication, diet, health changes since last INR:chart reviewed; none identified  Contacted within the last 12 weeks by phone on 11/14/23  Last [facility] referral date: 09/15/2023      LISA Howellian was NOT contacted regarding therapeutic result today per home monitoring policy manage by exception agreement.   Current warfarin dose is to be continued:     Summary  As of 1/9/2024      Full warfarin instructions:  2.5 mg every Tue; 5 mg all other days   Next INR check:  1/16/2024             ?   Ketty Myers RN  Anticoagulation Clinic  1/9/2024    _______________________________________________________________________     Anticoagulation Episode Summary       Current INR goal:  2.0-3.0   TTR:  75.6% (1 y)   Target end date:  Indefinite   Send INR reminders to:  [facility]    Indications    Thrombophilia (H24) [D68.59]  Long term (current) use of anticoagulants [Z79.01]  History of pulmonary embolism [Z86.711]             Comments:  [employer] home meter, MBE             Anticoagulation Care Providers       Provider Role Specialty Phone number    Dl Allen MD Referring Family Medicine [phone]

## 2024-01-11 NOTE — PROGRESS NOTES
[facility]  [facility]  [address], [address], [place] [address]  Main: [phone], Fax: [phone]          Outpatient Clinic Visit  Date:  01/08/2024    NOTE:  This visit was conducted by video, with the patient's approval.  Patient location: Home  Provider location: Onsite      Matt Rodriguez is a 69 yo male with a history of unprovoked pulmonary embolism, here today to revisit his long-term anticoagulation plan.  We last saw him in December 2022.    Background history:  He has a history of unprovoked pulmonary embolism in November 2018. He was anticoagulated with rivaroxaban. However at some point, he changed to warfarin, apparently due to increased nuisance bleeding on rivaroxaban (primarily in the form of bright red blood per rectum related to hemorrhoids). He also had expressed concern about the lack of an antidote for rivaroxaban (although that is no longer the case). He has done well on warfarin without any problematic nuisance bleeding and without any more serious bleeding.    There is a family history of venous thrombosis. His mom had a DVT in the postpartum period. He has a brother who had what sounds like an unprovoked DVT. He has another brother with a clot in his leg, circumstances unclear.    Previous thrombophilia testing showed that he does not have factor V Leiden or the prothrombin gene mutation. He also does not have protein C, protein S, or antithrombin deficiency. There appears to have been some question of whether or not he may have antiphospholipid antibody syndrome. He had a positive lupus anticoagulant intermittently in the past, but the positive values appear to be due to the fact that he was tested while on rivaroxaban.  When testing was done off rivaroxaban in June 2019, a lupus inhibitor was not detected.   He also had negative cardiolipin antibody titers.    He has no other significant past medical history and his only  medication is warfarin.    Interval history:  Since his last visit, Matt has remained well.  He continues to tolerate warfarin without any difficulty.  He denies any bleeding issues.  Continues to use a home INR monitor.    Physical exam:   He appears well.  Detailed exam not performed (video visit).    Labs:  INR data over the last year were reviewed.  His INRs range from 1.9 to 3.9.  Out of 48 INR readings, he  had only 1 subtherapeutic value.  Using a strict target range of 2-3, his time in therapeutic range is 77%.  Overall, his INR control is excellent.      ASSESSMENT / PLAN:  1. Unprovoked pulmonary embolism, November 2018  2. Long-term anticoagulation, currently on warfarin  3. Family history of venous thrombosis, with no identified genetic thrombophilia    Overall, Matt is doing well on warfarin and remains appropriate for long-term anticoagulation.  We will continue to revisit this annually, sooner with new issues or concerns.    Total time on date of encounter 30 minutes, including review of medical records and labs, video visit, and documentation.        Conner Raygoza MD  Professor of Medicine  Division of Hematology, Oncology, and Transplantation  Director, [facility]

## 2024-01-16 ENCOUNTER — DOCUMENTATION ONLY (OUTPATIENT)
Dept: ANTICOAGULATION | Facility: CLINIC | Age: 69
End: 2024-01-16
Payer: COMMERCIAL

## 2024-01-16 DIAGNOSIS — Z79.01 LONG TERM (CURRENT) USE OF ANTICOAGULANTS: ICD-10-CM

## 2024-01-16 DIAGNOSIS — Z86.711 HISTORY OF PULMONARY EMBOLISM: ICD-10-CM

## 2024-01-16 DIAGNOSIS — D68.59 THROMBOPHILIA (H): Primary | ICD-10-CM

## 2024-01-16 LAB — INR HOME MONITORING: 2.5 RATIO (ref 2–3)

## 2024-01-16 NOTE — PROGRESS NOTES
ANTICOAGULATION  MANAGEMENT-Home Monitor Managed by Exception    Matt Rodriguez 68 year old male is on warfarin with therapeutic INR result. (Goal INR 2.0-3.0)    Recent labs: (last 7 days)     01/16/24  0945   INR 2.5       Previous INR was Therapeutic  Medication, diet, health changes since last INR:chart reviewed; none identified  Contacted within the last 12 weeks by phone on 11/14/2023  Last [facility] referral date: 09/15/2023      LISA Gutierrez was NOT contacted regarding therapeutic result today per home monitoring policy manage by exception agreement.   Current warfarin dose is to be continued:     Summary  As of 1/16/2024      Full warfarin instructions:  2.5 mg every Tue; 5 mg all other days   Next INR check:  1/23/2024             ?   Zaida Gamez RN  Anticoagulation Clinic  1/16/2024    _______________________________________________________________________     Anticoagulation Episode Summary       Current INR goal:  2.0-3.0   TTR:  75.6% (1 y)   Target end date:  Indefinite   Send INR reminders to:  [facility]    Indications    Thrombophilia (H24) [D68.59]  Long term (current) use of anticoagulants [Z79.01]  History of pulmonary embolism [Z86.711]             Comments:  [employer] home meter, MBE             Anticoagulation Care Providers       Provider Role Specialty Phone number    Dl Allen MD Referring Family Medicine [phone]

## 2024-01-23 ENCOUNTER — DOCUMENTATION ONLY (OUTPATIENT)
Dept: ANTICOAGULATION | Facility: CLINIC | Age: 69
End: 2024-01-23
Payer: COMMERCIAL

## 2024-01-23 DIAGNOSIS — D68.59 THROMBOPHILIA (H): Primary | ICD-10-CM

## 2024-01-23 DIAGNOSIS — Z86.711 HISTORY OF PULMONARY EMBOLISM: ICD-10-CM

## 2024-01-23 DIAGNOSIS — Z79.01 LONG TERM (CURRENT) USE OF ANTICOAGULANTS: ICD-10-CM

## 2024-01-23 LAB — INR HOME MONITORING: 2.6 RATIO (ref 2–3)

## 2024-01-23 NOTE — PROGRESS NOTES
ANTICOAGULATION  MANAGEMENT-Home Monitor Managed by Exception    Matt CROSS Marjoriecristine 68 year old male is on warfarin with therapeutic INR result. (Goal INR 2.0-3.0)    Recent labs: (last 7 days)     01/23/24  0808   INR 2.6       Previous INR was Therapeutic  Medication, diet, health changes since last INR:chart reviewed; none identified  Contacted within the last 12 weeks by phone on 11/14/2023  Last [facility] referral date: 09/15/2023      LISA Gutierrez was NOT contacted regarding therapeutic result today per home monitoring policy manage by exception agreement.   Current warfarin dose is to be continued:     Summary  As of 1/23/2024      Full warfarin instructions:  2.5 mg every Tue; 5 mg all other days   Next INR check:  1/30/2024             ?   Tania Liriano RN  Anticoagulation Clinic  1/23/2024    _______________________________________________________________________     Anticoagulation Episode Summary       Current INR goal:  2.0-3.0   TTR:  75.6% (1 y)   Target end date:  Indefinite   Send INR reminders to:  [facility]    Indications    Thrombophilia (H24) [D68.59]  Long term (current) use of anticoagulants [Z79.01]  History of pulmonary embolism [Z86.711]             Comments:  [employer] home meter, MBE             Anticoagulation Care Providers       Provider Role Specialty Phone number    Dl Allen MD Referring Family Medicine [phone]

## 2024-01-30 ENCOUNTER — DOCUMENTATION ONLY (OUTPATIENT)
Dept: ANTICOAGULATION | Facility: CLINIC | Age: 69
End: 2024-01-30
Payer: COMMERCIAL

## 2024-01-30 DIAGNOSIS — Z86.711 HISTORY OF PULMONARY EMBOLISM: ICD-10-CM

## 2024-01-30 DIAGNOSIS — D68.59 THROMBOPHILIA (H): Primary | ICD-10-CM

## 2024-01-30 DIAGNOSIS — Z79.01 LONG TERM (CURRENT) USE OF ANTICOAGULANTS: ICD-10-CM

## 2024-01-30 LAB — INR HOME MONITORING: 2.7 RATIO (ref 2–3)

## 2024-01-30 NOTE — PROGRESS NOTES
ANTICOAGULATION  MANAGEMENT-Home Monitor Managed by Exception    Matt CROSS Marjoriecristine 68 year old male is on warfarin with therapeutic INR result. (Goal INR 2.0-3.0)    Recent labs: (last 7 days)     01/30/24  0807   INR 2.7       Previous INR was Therapeutic  Medication, diet, health changes since last INR:chart reviewed; none identified  Contacted within the last 12 weeks by phone on 11/14/23  Last [facility] referral date: 09/15/2023      LISA Howellian was NOT contacted regarding therapeutic result today per home monitoring policy manage by exception agreement.   Current warfarin dose is to be continued:     Summary  As of 1/30/2024      Full warfarin instructions:  2.5 mg every Tue; 5 mg all other days   Next INR check:  2/6/2024             ?   Zaida Nevarez RN  Anticoagulation Clinic  1/30/2024    _______________________________________________________________________     Anticoagulation Episode Summary       Current INR goal:  2.0-3.0   TTR:  75.6% (1 y)   Target end date:  Indefinite   Send INR reminders to:  [facility]    Indications    Thrombophilia (H24) [D68.59]  Long term (current) use of anticoagulants [Z79.01]  History of pulmonary embolism [Z86.711]             Comments:  [employer] home meter, MBE             Anticoagulation Care Providers       Provider Role Specialty Phone number    Dl Allen MD Referring Family Medicine [phone]

## 2024-02-06 ENCOUNTER — DOCUMENTATION ONLY (OUTPATIENT)
Dept: ANTICOAGULATION | Facility: CLINIC | Age: 69
End: 2024-02-06
Payer: COMMERCIAL

## 2024-02-06 DIAGNOSIS — Z86.711 HISTORY OF PULMONARY EMBOLISM: ICD-10-CM

## 2024-02-06 DIAGNOSIS — D68.59 THROMBOPHILIA (H): Primary | ICD-10-CM

## 2024-02-06 DIAGNOSIS — Z79.01 LONG TERM (CURRENT) USE OF ANTICOAGULANTS: ICD-10-CM

## 2024-02-06 LAB — INR HOME MONITORING: 2.8 RATIO (ref 2–3)

## 2024-02-06 NOTE — PROGRESS NOTES
ANTICOAGULATION  MANAGEMENT-Home Monitor Managed by Exception    Matt TAY Rodriguez 68 year old male is on warfarin with therapeutic INR result. (Goal INR 2.0-3.0)    Recent labs: (last 7 days)     02/06/24  0904   INR 2.8       Previous INR was Therapeutic  Medication, diet, health changes since last INR:chart reviewed; none identified  Contacted within the last 12 weeks by phone on 11/14/23  Last [facility] referral date: 09/15/2023      LISA Howellian was NOT contacted regarding therapeutic result today per home monitoring policy manage by exception agreement.   Current warfarin dose is to be continued:     Summary  As of 2/6/2024      Full warfarin instructions:  2.5 mg every Tue; 5 mg all other days   Next INR check:  2/13/2024             ?   Ketty Myers RN  Anticoagulation Clinic  2/6/2024    _______________________________________________________________________     Anticoagulation Episode Summary       Current INR goal:  2.0-3.0   TTR:  75.6% (1 y)   Target end date:  Indefinite   Send INR reminders to:  [facility]    Indications    Thrombophilia (H24) [D68.59]  Long term (current) use of anticoagulants [Z79.01]  History of pulmonary embolism [Z86.711]             Comments:  [employer] home meter, MBE             Anticoagulation Care Providers       Provider Role Specialty Phone number    Dl Allen MD Referring Family Medicine [phone]

## 2024-02-13 ENCOUNTER — ANTICOAGULATION THERAPY VISIT (OUTPATIENT)
Dept: ANTICOAGULATION | Facility: CLINIC | Age: 69
End: 2024-02-13
Payer: COMMERCIAL

## 2024-02-13 LAB — INR HOME MONITORING: 2.1 RATIO (ref 2–3)

## 2024-02-13 NOTE — PROGRESS NOTES
ANTICOAGULATION MANAGEMENT     Matt Rodriguez 68 year old male is on warfarin with therapeutic INR result. (Goal INR 2.0-3.0)    Recent labs: (last 7 days)     02/13/24  0726   INR 2.1       ASSESSMENT     Source(s): Chart Review and Patient/Caregiver Call     Warfarin doses taken: Less warfarin taken than planned which may be affecting INR  Diet:  Additional serving of greens over the weekend.  Medication/supplement changes: None noted  New illness, injury, or hospitalization: No  Signs or symptoms of bleeding or clotting: No  Previous result: Therapeutic last 2(+) visits  Additional findings:  Patient missed a dose on 2/11 and consumed extra greens over the weekend. Patient requesting to take 5 mgs today. Shared clinical decision making,  request is reasonable as patient prefers mid goal range for INR .       PLAN     Recommended plan for temporary change(s) affecting INR     Dosing Instructions:  2/13 5 mgs, then continue current maintenance dose  with next INR in 1 week       Summary  As of 2/13/2024      Full warfarin instructions:  2/13: 5 mg; Otherwise 2.5 mg every Tue; 5 mg all other days   Next INR check:  2/20/2024               Telephone call with Matt who agrees to plan and repeated back plan correctly    Patient to recheck with home meter    Education provided:   Please call back if any changes to your diet, medications or how you've been taking warfarin  Goal range and lab monitoring: goal range and significance of current result  Resume manage by exception with home monitor. Continue to submit INR results to home monitor company.You will only be called when your result is out of range. Please call and notify [facility] if new medication started, dose missed, signs or symptoms of bleeding or clotting, or a surgery/procedure is scheduled.    Plan made per [facility] anticoagulation protocol    Martha Trujillo RN  Anticoagulation  Clinic  2/13/2024    _______________________________________________________________________     Anticoagulation Episode Summary       Current INR goal:  2.0-3.0   TTR:  75.6% (1 y)   Target end date:  Indefinite   Send INR reminders to:  [facility]    Indications    Thrombophilia (H24) [D68.59]  Long term (current) use of anticoagulants [Z79.01]  History of pulmonary embolism [Z86.711]             Comments:  [employer] home meter, MBE             Anticoagulation Care Providers       Provider Role Specialty Phone number    Dl Allen MD Referring Family Medicine [phone]

## 2024-02-20 ENCOUNTER — DOCUMENTATION ONLY (OUTPATIENT)
Dept: ANTICOAGULATION | Facility: CLINIC | Age: 69
End: 2024-02-20
Payer: COMMERCIAL

## 2024-02-20 DIAGNOSIS — D68.59 THROMBOPHILIA (H): Primary | ICD-10-CM

## 2024-02-20 DIAGNOSIS — Z86.711 HISTORY OF PULMONARY EMBOLISM: ICD-10-CM

## 2024-02-20 DIAGNOSIS — Z79.01 LONG TERM (CURRENT) USE OF ANTICOAGULANTS: ICD-10-CM

## 2024-02-20 LAB — INR HOME MONITORING: 2.5 RATIO (ref 2–3)

## 2024-02-20 NOTE — PROGRESS NOTES
ANTICOAGULATION  MANAGEMENT-Home Monitor Managed by Exception    Matt CROSS Marjoriecristine 68 year old male is on warfarin with therapeutic INR result. (Goal INR 2.0-3.0)    Recent labs: (last 7 days)     02/20/24  0824   INR 2.5       Previous INR was Therapeutic  Medication, diet, health changes since last INR:chart reviewed; none identified  Contacted within the last 12 weeks by phone on 2/13/2024  Last [facility] referral date: 09/15/2023      LISA Gutierrez was NOT contacted regarding therapeutic result today per home monitoring policy manage by exception agreement.   Current warfarin dose is to be continued:     Summary  As of 2/20/2024      Full warfarin instructions:  2.5 mg every Tue; 5 mg all other days   Next INR check:  2/27/2024             ?   Tania Liriano RN  Anticoagulation Clinic  2/20/2024    _______________________________________________________________________     Anticoagulation Episode Summary       Current INR goal:  2.0-3.0   TTR:  75.6% (1 y)   Target end date:  Indefinite   Send INR reminders to:  [facility]    Indications    Thrombophilia (H24) [D68.59]  Long term (current) use of anticoagulants [Z79.01]  History of pulmonary embolism [Z86.711]             Comments:  [employer] home meter, MBE             Anticoagulation Care Providers       Provider Role Specialty Phone number    Dl Allen MD Referring Family Medicine [phone]

## 2024-02-27 ENCOUNTER — DOCUMENTATION ONLY (OUTPATIENT)
Dept: ANTICOAGULATION | Facility: CLINIC | Age: 69
End: 2024-02-27
Payer: COMMERCIAL

## 2024-02-27 DIAGNOSIS — Z79.01 LONG TERM (CURRENT) USE OF ANTICOAGULANTS: ICD-10-CM

## 2024-02-27 DIAGNOSIS — Z86.711 HISTORY OF PULMONARY EMBOLISM: ICD-10-CM

## 2024-02-27 DIAGNOSIS — D68.59 THROMBOPHILIA (H): Primary | ICD-10-CM

## 2024-02-27 LAB — INR HOME MONITORING: 2.6 RATIO (ref 2–3)

## 2024-02-27 NOTE — PROGRESS NOTES
ANTICOAGULATION  MANAGEMENT-Home Monitor Managed by Exception    Matt CROSS Michael 68 year old male is on warfarin with therapeutic INR result. (Goal INR 2.0-3.0)    Recent labs: (last 7 days)     02/27/24  0748   INR 2.6       Previous INR was Therapeutic  Medication, diet, health changes since last INR:chart reviewed; none identified  Contacted within the last 12 weeks by phone on 2/13/24  Last [facility] referral date: 09/15/2023      LISA Howellian was NOT contacted regarding therapeutic result today per home monitoring policy manage by exception agreement.   Current warfarin dose is to be continued:     Summary  As of 2/27/2024      Full warfarin instructions:  2.5 mg every Tue; 5 mg all other days   Next INR check:  3/5/2024             ?   Zaida Nevarez RN  Anticoagulation Clinic  2/27/2024    _______________________________________________________________________     Anticoagulation Episode Summary       Current INR goal:  2.0-3.0   TTR:  77.1% (1 y)   Target end date:  Indefinite   Send INR reminders to:  [facility]    Indications    Thrombophilia (H24) [D68.59]  Long term (current) use of anticoagulants [Z79.01]  History of pulmonary embolism [Z86.711]             Comments:  [employer] home meter, MBE             Anticoagulation Care Providers       Provider Role Specialty Phone number    Dl Allen MD Referring Family Medicine [phone]

## 2024-03-05 ENCOUNTER — DOCUMENTATION ONLY (OUTPATIENT)
Dept: ANTICOAGULATION | Facility: CLINIC | Age: 69
End: 2024-03-05
Payer: COMMERCIAL

## 2024-03-05 DIAGNOSIS — D68.59 THROMBOPHILIA (H): Primary | ICD-10-CM

## 2024-03-05 DIAGNOSIS — Z79.01 LONG TERM (CURRENT) USE OF ANTICOAGULANTS: ICD-10-CM

## 2024-03-05 DIAGNOSIS — Z86.711 HISTORY OF PULMONARY EMBOLISM: ICD-10-CM

## 2024-03-05 LAB — INR HOME MONITORING: 2.4 RATIO (ref 2–3)

## 2024-03-05 NOTE — PROGRESS NOTES
ANTICOAGULATION  MANAGEMENT-Home Monitor Managed by Exception    Matt CROSS Marjoriecristine 68 year old male is on warfarin with therapeutic INR result. (Goal INR 2.0-3.0)    Recent labs: (last 7 days)     03/05/24  0742   INR 2.4       Previous INR was Therapeutic  Medication, diet, health changes since last INR:chart reviewed; none identified  Contacted within the last 12 weeks by phone on 2/13/24  Last [facility] referral date: 09/15/2023      LISA Howellian was NOT contacted regarding therapeutic result today per home monitoring policy manage by exception agreement.   Current warfarin dose is to be continued:     Summary  As of 3/5/2024      Full warfarin instructions:  2.5 mg every Tue; 5 mg all other days   Next INR check:  3/12/2024             ?   Zaida Nevarez RN  Anticoagulation Clinic  3/5/2024    _______________________________________________________________________     Anticoagulation Episode Summary       Current INR goal:  2.0-3.0   TTR:  77.1% (1 y)   Target end date:  Indefinite   Send INR reminders to:  [facility]    Indications    Thrombophilia (H24) [D68.59]  Long term (current) use of anticoagulants [Z79.01]  History of pulmonary embolism [Z86.711]             Comments:  [employer] home meter, MBE             Anticoagulation Care Providers       Provider Role Specialty Phone number    Dl Allen MD Referring Family Medicine [phone]

## 2024-03-13 ENCOUNTER — DOCUMENTATION ONLY (OUTPATIENT)
Dept: ANTICOAGULATION | Facility: CLINIC | Age: 69
End: 2024-03-13
Payer: COMMERCIAL

## 2024-03-13 DIAGNOSIS — Z86.711 HISTORY OF PULMONARY EMBOLISM: ICD-10-CM

## 2024-03-13 DIAGNOSIS — Z79.01 LONG TERM (CURRENT) USE OF ANTICOAGULANTS: ICD-10-CM

## 2024-03-13 DIAGNOSIS — D68.59 THROMBOPHILIA (H): Primary | ICD-10-CM

## 2024-03-13 LAB — INR HOME MONITORING: 2.3 RATIO (ref 2–3)

## 2024-03-13 NOTE — PROGRESS NOTES
ANTICOAGULATION  MANAGEMENT-Home Monitor Managed by Exception    Matt CROSS Marjoriecristine 68 year old male is on warfarin with therapeutic INR result. (Goal INR 2.0-3.0)    Recent labs: (last 7 days)     03/13/24  0903   INR 2.3       Previous INR was Therapeutic  Medication, diet, health changes since last INR:chart reviewed; none identified  Contacted within the last 12 weeks by phone on 2/13/24  Last [facility] referral date: 09/15/2023      LISA Howellian was NOT contacted regarding therapeutic result today per home monitoring policy manage by exception agreement.   Current warfarin dose is to be continued:     Summary  As of 3/13/2024      Full warfarin instructions:  2.5 mg every Tue; 5 mg all other days   Next INR check:  3/20/2024             ?   Zaida Nevarez RN  Anticoagulation Clinic  3/13/2024    _______________________________________________________________________     Anticoagulation Episode Summary       Current INR goal:  2.0-3.0   TTR:  77.1% (1 y)   Target end date:  Indefinite   Send INR reminders to:  [facility]    Indications    Thrombophilia (H24) [D68.59]  Long term (current) use of anticoagulants [Z79.01]  History of pulmonary embolism [Z86.711]             Comments:  [employer] home meter, MBE             Anticoagulation Care Providers       Provider Role Specialty Phone number    Dl Allen MD Referring Family Medicine [phone]

## 2024-03-19 ENCOUNTER — TELEPHONE (OUTPATIENT)
Dept: FAMILY MEDICINE | Facility: CLINIC | Age: 69
End: 2024-03-19
Payer: COMMERCIAL

## 2024-03-19 ENCOUNTER — ANTICOAGULATION THERAPY VISIT (OUTPATIENT)
Dept: ANTICOAGULATION | Facility: CLINIC | Age: 69
End: 2024-03-19
Payer: COMMERCIAL

## 2024-03-19 DIAGNOSIS — D68.59 THROMBOPHILIA (H): Primary | ICD-10-CM

## 2024-03-19 DIAGNOSIS — Z79.01 LONG TERM (CURRENT) USE OF ANTICOAGULANTS: ICD-10-CM

## 2024-03-19 DIAGNOSIS — Z86.711 HISTORY OF PULMONARY EMBOLISM: ICD-10-CM

## 2024-03-19 LAB — INR HOME MONITORING: 3.2 RATIO (ref 2–3)

## 2024-03-19 NOTE — PROGRESS NOTES
ANTICOAGULATION MANAGEMENT     Matt Rodriguez 68 year old male is on warfarin with supratherapeutic INR result. (Goal INR 2.0-3.0)    Recent labs: (last 7 days)     03/19/24  0858   INR 3.2*       ASSESSMENT     Source(s): Chart Review and Patient/Caregiver Call     Warfarin doses taken: Warfarin taken as instructed  Diet: Patient reports instead of his normal coffee, he had energy drinks that contained dandre- this could effect INR. Patient has discontinued drinking these and does not plan to drink them anymore.  Medication/supplement changes: None noted  New illness, injury, or hospitalization: No  Signs or symptoms of bleeding or clotting: No  Previous result: Therapeutic last 2(+) visits  Additional findings: None       PLAN     Recommended plan for temporary change(s) affecting INR     Dosing Instructions: partial hold then continue your current warfarin dose with next INR in 1 week       Summary  As of 3/19/2024      Full warfarin instructions:  3/20: 2.5 mg; Otherwise 2.5 mg every Tue; 5 mg all other days   Next INR check:  3/26/2024               Telephone call with Matt who agrees to plan and repeated back plan correctly    Patient to recheck with home meter    Education provided:   Please call back if any changes to your diet, medications or how you've been taking warfarin  Symptom monitoring: monitoring for bleeding signs and symptoms, monitoring for clotting signs and symptoms, and monitoring for stroke signs and symptoms  Contact [phone]  with any changes, questions or concerns.     Plan made per [facility] anticoagulation protocol    Zaida Nevarez RN  Anticoagulation Clinic  3/19/2024    _______________________________________________________________________     Anticoagulation Episode Summary       Current INR goal:  2.0-3.0   TTR:  76.7% (1 y)   Target end date:  Indefinite   Send INR reminders to:  [facility]    Indications    Thrombophilia (H24) [D68.59]  Long term (current) use of  anticoagulants [Z79.01]  History of pulmonary embolism [Z86.711]             Comments:  [employer] home meter, [facility]             Anticoagulation Care Providers       Provider Role Specialty Phone number    Dl Allen MD Referring Family Medicine [phone]

## 2024-03-26 ENCOUNTER — ANTICOAGULATION THERAPY VISIT (OUTPATIENT)
Dept: ANTICOAGULATION | Facility: CLINIC | Age: 69
End: 2024-03-26
Payer: COMMERCIAL

## 2024-03-26 DIAGNOSIS — D68.59 THROMBOPHILIA (H): Primary | ICD-10-CM

## 2024-03-26 DIAGNOSIS — Z79.01 LONG TERM (CURRENT) USE OF ANTICOAGULANTS: ICD-10-CM

## 2024-03-26 DIAGNOSIS — Z86.711 HISTORY OF PULMONARY EMBOLISM: ICD-10-CM

## 2024-03-26 LAB — INR HOME MONITORING: 2.3 RATIO (ref 2–3)

## 2024-03-26 NOTE — PROGRESS NOTES
ANTICOAGULATION MANAGEMENT     Matt Rodriguez 68 year old male is on warfarin with therapeutic INR result. (Goal INR 2.0-3.0)    Recent labs: (last 7 days)     03/26/24  0830   INR 2.3       ASSESSMENT     Source(s): Chart Review and Patient/Caregiver Call     Warfarin doses taken: Warfarin taken as instructed  Diet: No new diet changes identified  Medication/supplement changes: None noted  New illness, injury, or hospitalization: No  Signs or symptoms of bleeding or clotting: No  Previous result: Supratherapeutic  Additional findings: Discussed [facility] Program which patient agrees to do       PLAN     Recommended plan for no diet, medication or health factor changes affecting INR     Dosing Instructions: Continue your current warfarin dose with next INR in 1 week       Summary  As of 3/26/2024      Full warfarin instructions:  2.5 mg every Tue; 5 mg all other days   Next INR check:  4/2/2024               Telephone call with Matt who agrees to plan and repeated back plan correctly    Patient to recheck with home meter    Education provided:   Please call back if any changes to your diet, medications or how you've been taking warfarin  Resume manage by exception with home monitor. Continue to submit INR results to home monitor company.You will only be called when your result is out of range. Please call and notify [facility] if new medication started, dose missed, signs or symptoms of bleeding or clotting, or a surgery/procedure is scheduled. Due for next call no later than: 6/24/24.    Plan made per [facility] anticoagulation protocol    Ketty Myers, RN  Anticoagulation Clinic  3/26/2024    _______________________________________________________________________     Anticoagulation Episode Summary       Current INR goal:  2.0-3.0   TTR:  77.1% (1 y)   Target end date:  Indefinite   Send INR reminders to:  [facility]    Indications    Thrombophilia (H24) [D68.59]  Long term (current) use of anticoagulants  [Z79.01]  History of pulmonary embolism [Z86.711]             Comments:  [employer] home meter, M[facility]             Anticoagulation Care Providers       Provider Role Specialty Phone number    Dl Allen MD Referring Family Medicine [phone]

## 2024-04-02 ENCOUNTER — DOCUMENTATION ONLY (OUTPATIENT)
Dept: ANTICOAGULATION | Facility: CLINIC | Age: 69
End: 2024-04-02
Payer: COMMERCIAL

## 2024-04-02 DIAGNOSIS — Z79.01 LONG TERM (CURRENT) USE OF ANTICOAGULANTS: ICD-10-CM

## 2024-04-02 DIAGNOSIS — D68.59 THROMBOPHILIA (H): Primary | ICD-10-CM

## 2024-04-02 DIAGNOSIS — Z86.711 HISTORY OF PULMONARY EMBOLISM: ICD-10-CM

## 2024-04-02 LAB — INR HOME MONITORING: 2.5 RATIO (ref 2–3)

## 2024-04-02 NOTE — PROGRESS NOTES
ANTICOAGULATION  MANAGEMENT-Home Monitor Managed by Exception    Matt CROSS Marjoriecristine 68 year old male is on warfarin with therapeutic INR result. (Goal INR 2.0-3.0)    Recent labs: (last 7 days)     04/02/24  0700   INR 2.5       Previous INR was Therapeutic  Medication, diet, health changes since last INR:chart reviewed; none identified  Contacted within the last 12 weeks by phone on 3/26/2024  Last [facility] referral date: 09/15/2023      LISA Gutierrez was NOT contacted regarding therapeutic result today per home monitoring policy manage by exception agreement.   Current warfarin dose is to be continued:     Summary  As of 4/2/2024      Full warfarin instructions:  2.5 mg every Tue; 5 mg all other days   Next INR check:  4/9/2024             ?   Tania Liriano RN  Anticoagulation Clinic  4/2/2024    _______________________________________________________________________     Anticoagulation Episode Summary       Current INR goal:  2.0-3.0   TTR:  79.0% (1 y)   Target end date:  Indefinite   Send INR reminders to:  [facility]    Indications    Thrombophilia (H24) [D68.59]  Long term (current) use of anticoagulants [Z79.01]  History of pulmonary embolism [Z86.711]             Comments:  [employer] home meter, MBE             Anticoagulation Care Providers       Provider Role Specialty Phone number    Dl Allen MD Referring Family Medicine [phone]

## 2024-04-09 ENCOUNTER — DOCUMENTATION ONLY (OUTPATIENT)
Dept: ANTICOAGULATION | Facility: CLINIC | Age: 69
End: 2024-04-09
Payer: COMMERCIAL

## 2024-04-09 DIAGNOSIS — Z79.01 LONG TERM (CURRENT) USE OF ANTICOAGULANTS: ICD-10-CM

## 2024-04-09 DIAGNOSIS — D68.59 THROMBOPHILIA (H): Primary | ICD-10-CM

## 2024-04-09 DIAGNOSIS — Z86.711 HISTORY OF PULMONARY EMBOLISM: ICD-10-CM

## 2024-04-09 LAB — INR HOME MONITORING: 2.7 RATIO (ref 2–3)

## 2024-04-09 NOTE — PROGRESS NOTES
ANTICOAGULATION  MANAGEMENT-Home Monitor Managed by Exception    Matt Rodriguez 68 year old male is on warfarin with therapeutic INR result. (Goal INR 2.0-3.0)    Recent labs: (last 7 days)     04/09/24  0803   INR 2.7       Previous INR was Therapeutic  Medication, diet, health changes since last INR:chart reviewed; none identified  Contacted within the last 12 weeks by phone on 03/26/2024  Due for next call no later than: 6/24/24   Last [facility] referral date: 09/15/2023      LISA Howellian was NOT contacted regarding therapeutic result today per home monitoring policy manage by exception agreement.   Current warfarin dose is to be continued:     Summary  As of 4/9/2024      Full warfarin instructions:  2.5 mg every Tue; 5 mg all other days   Next INR check:  4/16/2024             ?   Carmita Quiles, RN  Anticoagulation Clinic  4/9/2024    _______________________________________________________________________     Anticoagulation Episode Summary       Current INR goal:  2.0-3.0   TTR:  79.6% (1 y)   Target end date:  Indefinite   Send INR reminders to:  [facility]    Indications    Thrombophilia (H24) [D68.59]  Long term (current) use of anticoagulants [Z79.01]  History of pulmonary embolism [Z86.711]             Comments:  [employer] home meter, MBE             Anticoagulation Care Providers       Provider Role Specialty Phone number    Dl Allen MD Referring Family Medicine [phone]

## 2024-04-16 ENCOUNTER — DOCUMENTATION ONLY (OUTPATIENT)
Dept: ANTICOAGULATION | Facility: CLINIC | Age: 69
End: 2024-04-16
Payer: COMMERCIAL

## 2024-04-16 DIAGNOSIS — Z86.711 HISTORY OF PULMONARY EMBOLISM: ICD-10-CM

## 2024-04-16 DIAGNOSIS — D68.59 THROMBOPHILIA (H): Primary | ICD-10-CM

## 2024-04-16 DIAGNOSIS — Z79.01 LONG TERM (CURRENT) USE OF ANTICOAGULANTS: ICD-10-CM

## 2024-04-16 LAB — INR HOME MONITORING: 2.4 RATIO (ref 2–3)

## 2024-04-16 NOTE — PROGRESS NOTES
ANTICOAGULATION  MANAGEMENT-Home Monitor Managed by Exception    Matt TAY Rodriguez 68 year old male is on warfarin with therapeutic INR result. (Goal INR 2.0-3.0)    Recent labs: (last 7 days)     04/16/24  0904   INR 2.4       Previous INR was Therapeutic  Medication, diet, health changes since last INR:chart reviewed; none identified  Contacted within the last 12 weeks by phone on 3/26/24  Last [facility] referral date: 09/15/2023      LISA Howellian was NOT contacted regarding therapeutic result today per home monitoring policy manage by exception agreement.   Current warfarin dose is to be continued:     Summary  As of 4/16/2024      Full warfarin instructions:  2.5 mg every Tue; 5 mg all other days   Next INR check:  4/23/2024             ?   Ketty Myers RN  Anticoagulation Clinic  4/16/2024    _______________________________________________________________________     Anticoagulation Episode Summary       Current INR goal:  2.0-3.0   TTR:  79.6% (1 y)   Target end date:  Indefinite   Send INR reminders to:  [facility]    Indications    Thrombophilia (H24) [D68.59]  Long term (current) use of anticoagulants [Z79.01]  History of pulmonary embolism [Z86.711]             Comments:  [employer] home meter, MBE             Anticoagulation Care Providers       Provider Role Specialty Phone number    Dl Allen MD Referring Family Medicine [phone]

## 2024-04-23 ENCOUNTER — ANTICOAGULATION THERAPY VISIT (OUTPATIENT)
Dept: ANTICOAGULATION | Facility: CLINIC | Age: 69
End: 2024-04-23
Payer: COMMERCIAL

## 2024-04-23 DIAGNOSIS — Z79.01 LONG TERM (CURRENT) USE OF ANTICOAGULANTS: ICD-10-CM

## 2024-04-23 DIAGNOSIS — D68.59 THROMBOPHILIA (H): Primary | ICD-10-CM

## 2024-04-23 DIAGNOSIS — Z86.711 HISTORY OF PULMONARY EMBOLISM: ICD-10-CM

## 2024-04-23 LAB — INR HOME MONITORING: 1.9 RATIO (ref 2–3)

## 2024-04-23 NOTE — PROGRESS NOTES
ANTICOAGULATION MANAGEMENT     Matt Rodriguez 68 year old male is on warfarin with subtherapeutic INR result. (Goal INR 2.0-3.0)    Recent labs: (last 7 days)     04/23/24  0802   INR 1.9*       ASSESSMENT     Source(s): Chart Review and patient call     Warfarin doses taken: Missed dose(s) may be affecting INR  Diet: No new diet changes identified  Medication/supplement changes: None noted  New illness, injury, or hospitalization: No  Signs or symptoms of bleeding or clotting: No  Previous result: Therapeutic last 2(+) visits  Additional findings: None       PLAN     Recommended plan for temporary change(s) affecting INR     Dosing Instructions: booster dose then continue your current warfarin dose with next INR in 1 week       Summary  As of 4/23/2024      Full warfarin instructions:  4/23: 5 mg; Otherwise 2.5 mg every Tue; 5 mg all other days   Next INR check:  4/30/2024               Telephone call with Matt who verbalizes understanding and agrees to plan and who agrees to plan and repeated back plan correctly    Patient to recheck with home meter    Education provided:   Contact [phone]  with any changes, questions or concerns.     Plan made per [facility] anticoagulation protocol    Virginia Benitez RN  Anticoagulation Clinic  4/23/2024    _______________________________________________________________________     Anticoagulation Episode Summary       Current INR goal:  2.0-3.0   TTR:  79.2% (1 y)   Target end date:  Indefinite   Send INR reminders to:  [facility]    Indications    Thrombophilia (H24) [D68.59]  Long term (current) use of anticoagulants [Z79.01]  History of pulmonary embolism [Z86.711]             Comments:  [employer] home meter, MBE             Anticoagulation Care Providers       Provider Role Specialty Phone number    Dl Allen MD Referring Family Medicine [phone]

## 2024-04-30 ENCOUNTER — ANTICOAGULATION THERAPY VISIT (OUTPATIENT)
Dept: ANTICOAGULATION | Facility: CLINIC | Age: 69
End: 2024-04-30
Payer: COMMERCIAL

## 2024-04-30 DIAGNOSIS — Z79.01 LONG TERM (CURRENT) USE OF ANTICOAGULANTS: ICD-10-CM

## 2024-04-30 DIAGNOSIS — Z86.711 HISTORY OF PULMONARY EMBOLISM: ICD-10-CM

## 2024-04-30 DIAGNOSIS — D68.59 THROMBOPHILIA (H): Primary | ICD-10-CM

## 2024-04-30 LAB — INR HOME MONITORING: 2.9 RATIO (ref 2–3)

## 2024-04-30 NOTE — PROGRESS NOTES
ANTICOAGULATION MANAGEMENT     Matt Rodriguez 68 year old male is on warfarin with therapeutic INR result. (Goal INR 2.0-3.0)    Recent labs: (last 7 days)     04/30/24  0810   INR 2.9       ASSESSMENT     Source(s): Chart Review and Patient/Caregiver Call     Warfarin doses taken: Warfarin taken as instructed  Diet: No new diet changes identified  Medication/supplement changes: None noted  New illness, injury, or hospitalization: No  Signs or symptoms of bleeding or clotting: No  Previous result: Subtherapeutic  Additional findings: None       PLAN     Recommended plan for no diet, medication or health factor changes affecting INR     Dosing Instructions: Continue your current warfarin dose with next INR in 1 week       Summary  As of 4/30/2024      Full warfarin instructions:  2.5 mg every Tue; 5 mg all other days   Next INR check:  5/7/2024               Sent [employer] message with dosing and follow up instructions    Patient to recheck with home meter    Education provided:   Please call back if any changes to your diet, medications or how you've been taking warfarin    Plan made per [facility] anticoagulation protocol    Ketty Myers, RN  Anticoagulation Clinic  4/30/2024    _______________________________________________________________________     Anticoagulation Episode Summary       Current INR goal:  2.0-3.0   TTR:  79.0% (1 y)   Target end date:  Indefinite   Send INR reminders to:  [facility]    Indications    Thrombophilia (H24) [D68.59]  Long term (current) use of anticoagulants [Z79.01]  History of pulmonary embolism [Z86.711]             Comments:  [employer] home meter, MBE             Anticoagulation Care Providers       Provider Role Specialty Phone number    Dl Allen MD Referring Family Medicine [phone]

## 2024-05-02 DIAGNOSIS — D68.59 THROMBOPHILIA (H): ICD-10-CM

## 2024-05-02 RX ORDER — WARFARIN SODIUM 5 MG/1
TABLET ORAL
Qty: 90 TABLET | Refills: 3 | Status: SHIPPED | OUTPATIENT
Start: 2024-05-02

## 2024-05-07 ENCOUNTER — ANTICOAGULATION THERAPY VISIT (OUTPATIENT)
Dept: ANTICOAGULATION | Facility: CLINIC | Age: 69
End: 2024-05-07
Payer: COMMERCIAL

## 2024-05-07 DIAGNOSIS — D68.59 THROMBOPHILIA (H): Primary | ICD-10-CM

## 2024-05-07 DIAGNOSIS — Z79.01 LONG TERM (CURRENT) USE OF ANTICOAGULANTS: ICD-10-CM

## 2024-05-07 DIAGNOSIS — Z86.711 HISTORY OF PULMONARY EMBOLISM: ICD-10-CM

## 2024-05-07 LAB — INR HOME MONITORING: 2.8 RATIO (ref 2–3)

## 2024-05-07 NOTE — PROGRESS NOTES
ANTICOAGULATION MANAGEMENT     Matt Rodriguez 68 year old male is on warfarin with therapeutic INR result. (Goal INR 2.0-3.0)    Recent labs: (last 7 days)     05/07/24  0820   INR 2.8       ASSESSMENT     Source(s): Chart Review and Patient/Caregiver Call     Warfarin doses taken: Warfarin taken as instructed  Diet: No new diet changes identified  Medication/supplement changes: None noted  New illness, injury, or hospitalization: No  Signs or symptoms of bleeding or clotting: No  Previous result: Therapeutic last visit; previously outside of goal range  Additional findings: Patient agrees to terms of manage by exception status Patient aware [facility] with call if in  goal range and chart review requires patient contact for further assessment, as well as a 12 week follow up.        PLAN     Recommended plan for no diet, medication or health factor changes affecting INR     Dosing Instructions: Continue your current warfarin dose with next INR in 1 week       Summary  As of 5/7/2024      Full warfarin instructions:  2.5 mg every Tue; 5 mg all other days   Next INR check:  5/14/2024               Telephone call with Matt who agrees to plan and repeated back plan correctly    Patient to recheck with home meter    Education provided:   Please call back if any changes to your diet, medications or how you've been taking warfarin  Resume manage by exception with home monitor. Continue to submit INR results to home monitor company.You will only be called when your result is out of range. Please call and notify [facility] if new medication started, dose missed, signs or symptoms of bleeding or clotting, or a surgery/procedure is scheduled. Due for next call no later than: 8/5/24.    Plan made per [facility] anticoagulation protocol    Martha Trujillo RN  Anticoagulation Clinic  5/7/2024    _______________________________________________________________________     Anticoagulation Episode Summary       Current INR goal:  2.0-3.0   TTR:  79.4%  (1 y)   Target end date:  Indefinite   Send INR reminders to:  [facility]    Indications    Thrombophilia (H24) [D68.59]  Long term (current) use of anticoagulants [Z79.01]  History of pulmonary embolism [Z86.711]             Comments:  [employer] home meter, MBANNE[employer]MARIE             Anticoagulation Care Providers       Provider Role Specialty Phone number    Dl Allen MD Referring Family Medicine [phone]

## 2024-05-14 ENCOUNTER — DOCUMENTATION ONLY (OUTPATIENT)
Dept: ANTICOAGULATION | Facility: CLINIC | Age: 69
End: 2024-05-14
Payer: COMMERCIAL

## 2024-05-14 DIAGNOSIS — D68.59 THROMBOPHILIA (H): Primary | ICD-10-CM

## 2024-05-14 DIAGNOSIS — Z86.711 HISTORY OF PULMONARY EMBOLISM: ICD-10-CM

## 2024-05-14 DIAGNOSIS — Z79.01 LONG TERM (CURRENT) USE OF ANTICOAGULANTS: ICD-10-CM

## 2024-05-14 LAB — INR HOME MONITORING: 2.9 RATIO (ref 2–3)

## 2024-05-14 NOTE — PROGRESS NOTES
ANTICOAGULATION  MANAGEMENT-Home Monitor Managed by Exception    Matt TAY Rodriguez 68 year old male is on warfarin with therapeutic INR result. (Goal INR 2.0-3.0)    Recent labs: (last 7 days)     05/14/24  0744   INR 2.9       Previous INR was Therapeutic  Medication, diet, health changes since last INR:chart reviewed; none identified  Contacted within the last 12 weeks by phone on 5/7/24  Last [facility] referral date: 09/15/2023      LISA Howellian was NOT contacted regarding therapeutic result today per home monitoring policy manage by exception agreement.   Current warfarin dose is to be continued:     Summary  As of 5/14/2024      Full warfarin instructions:  2.5 mg every Tue; 5 mg all other days   Next INR check:  5/21/2024             ?   Ketty Myers RN  Anticoagulation Clinic  5/14/2024    _______________________________________________________________________     Anticoagulation Episode Summary       Current INR goal:  2.0-3.0   TTR:  81.2% (1 y)   Target end date:  Indefinite   Send INR reminders to:  [facility]    Indications    Thrombophilia (H24) [D68.59]  Long term (current) use of anticoagulants [Z79.01]  History of pulmonary embolism [Z86.711]             Comments:  [employer] home meter, MBE             Anticoagulation Care Providers       Provider Role Specialty Phone number    Dl Allen MD Referring Family Medicine [phone]

## 2024-05-29 ENCOUNTER — DOCUMENTATION ONLY (OUTPATIENT)
Dept: ANTICOAGULATION | Facility: CLINIC | Age: 69
End: 2024-05-29
Payer: COMMERCIAL

## 2024-05-29 DIAGNOSIS — Z86.711 HISTORY OF PULMONARY EMBOLISM: ICD-10-CM

## 2024-05-29 DIAGNOSIS — D68.59 THROMBOPHILIA (H): Primary | ICD-10-CM

## 2024-05-29 DIAGNOSIS — Z79.01 LONG TERM (CURRENT) USE OF ANTICOAGULANTS: ICD-10-CM

## 2024-05-29 LAB — INR HOME MONITORING: 2.4 RATIO (ref 2–3)

## 2024-05-29 NOTE — PROGRESS NOTES
ANTICOAGULATION  MANAGEMENT-Home Monitor Managed by Exception    Matt CROSS Michael 68 year old male is on warfarin with therapeutic INR result. (Goal INR 2.0-3.0)    Recent labs: (last 7 days)     05/29/24  1115   INR 2.4       Previous INR was Therapeutic  Medication, diet, health changes since last INR:chart reviewed; none identified  Contacted within the last 12 weeks by phone on 5/7/24  Last [facility] referral date: 09/15/2023      LISA Howellian was NOT contacted regarding therapeutic result today per home monitoring policy manage by exception agreement.   Current warfarin dose is to be continued:     Summary  As of 5/29/2024      Full warfarin instructions:  2.5 mg every Tue; 5 mg all other days   Next INR check:  6/5/2024             ?   Zaida Gamez RN  Anticoagulation Clinic  5/29/2024    _______________________________________________________________________     Anticoagulation Episode Summary       Current INR goal:  2.0-3.0   TTR:  81.2% (1 y)   Target end date:  Indefinite   Send INR reminders to:  [facility]    Indications    Thrombophilia (H24) [D68.59]  Long term (current) use of anticoagulants [Z79.01]  History of pulmonary embolism [Z86.711]             Comments:  [employer] home meter, MBE             Anticoagulation Care Providers       Provider Role Specialty Phone number    Dl Allen MD Referring Family Medicine [phone]

## 2024-06-05 ENCOUNTER — DOCUMENTATION ONLY (OUTPATIENT)
Dept: ANTICOAGULATION | Facility: CLINIC | Age: 69
End: 2024-06-05
Payer: COMMERCIAL

## 2024-06-05 LAB — INR HOME MONITORING: 2.9 RATIO (ref 2–3)

## 2024-06-05 NOTE — PROGRESS NOTES
ANTICOAGULATION  MANAGEMENT-Home Monitor Managed by Exception    Matt TAY Rodriguez 69 year old male is on warfarin with therapeutic INR result. (Goal INR 2.0-3.0)    Recent labs: (last 7 days)     06/05/24  1057   INR 2.9       Previous INR was Therapeutic  Medication, diet, health changes since last INR:chart reviewed; none identified  Contacted within the last 12 weeks by phone on 5/7/24    Last [facility] referral date: 09/15/2023      LISA Howellian was NOT contacted regarding therapeutic result today per home monitoring policy manage by exception agreement.   Current warfarin dose is to be continued:     Summary  As of 6/5/2024      Full warfarin instructions:  2.5 mg every Tue; 5 mg all other days   Next INR check:  6/12/2024             ?   Ashley Gillespie RN  Anticoagulation Clinic  6/5/2024    _______________________________________________________________________     Anticoagulation Episode Summary       Current INR goal:  2.0-3.0   TTR:  81.9% (1 y)   Target end date:  Indefinite   Send INR reminders to:  [facility]    Indications    Thrombophilia (H24) [D68.59]  Long term (current) use of anticoagulants [Z79.01]  History of pulmonary embolism [Z86.711]             Comments:  [employer] home meter, MBE             Anticoagulation Care Providers       Provider Role Specialty Phone number    Dl Allen MD Referring Family Medicine [phone]

## 2024-06-13 ENCOUNTER — ANTICOAGULATION THERAPY VISIT (OUTPATIENT)
Dept: ANTICOAGULATION | Facility: CLINIC | Age: 69
End: 2024-06-13
Payer: COMMERCIAL

## 2024-06-13 DIAGNOSIS — D68.59 THROMBOPHILIA (H): Primary | ICD-10-CM

## 2024-06-13 DIAGNOSIS — Z86.711 HISTORY OF PULMONARY EMBOLISM: ICD-10-CM

## 2024-06-13 DIAGNOSIS — Z79.01 LONG TERM (CURRENT) USE OF ANTICOAGULANTS: ICD-10-CM

## 2024-06-13 LAB — INR HOME MONITORING: 3.1 RATIO (ref 2–3)

## 2024-06-13 NOTE — PROGRESS NOTES
ANTICOAGULATION MANAGEMENT     Matt Rodriguez 69 year old male is on warfarin with supratherapeutic INR result. (Goal INR 2.0-3.0)    Recent labs: (last 7 days)     06/13/24  0753   INR 3.1*       ASSESSMENT     Source(s): Chart Review and Patient/Caregiver Call     Warfarin doses taken: Warfarin taken as instructed  Diet:  patient thinks he may have had more greens this past week.  Medication/supplement changes: None noted  New illness, injury, or hospitalization: No  Signs or symptoms of bleeding or clotting: No  Previous result: Therapeutic last 2(+) visits  Additional findings: None       PLAN     Recommended plan for no diet, medication or health factor changes affecting INR     Dosing Instructions: Continue your current warfarin dose with next INR in 1 week       Summary  As of 6/13/2024      Full warfarin instructions:  2.5 mg every Tue; 5 mg all other days   Next INR check:  6/20/2024               Telephone call with Matt who verbalizes understanding and agrees to plan  Sent [employer] message with dosing and follow up instructions    Patient to recheck with home meter    Education provided:   Please call back if any changes to your diet, medications or how you've been taking warfarin    Plan made per [facility] anticoagulation protocol    Tania Liriano, RN  Anticoagulation Clinic  6/13/2024    _______________________________________________________________________     Anticoagulation Episode Summary       Current INR goal:  2.0-3.0   TTR:  80.8% (1 y)   Target end date:  Indefinite   Send INR reminders to:  [facility]    Indications    Thrombophilia (H24) [D68.59]  Long term (current) use of anticoagulants [Z79.01]  History of pulmonary embolism [Z86.711]             Comments:  [employer] home meter, MBE             Anticoagulation Care Providers       Provider Role Specialty Phone number    Dl Allen MD Referring Family Medicine [phone]

## 2024-06-20 ENCOUNTER — ANTICOAGULATION THERAPY VISIT (OUTPATIENT)
Dept: ANTICOAGULATION | Facility: CLINIC | Age: 69
End: 2024-06-20
Payer: COMMERCIAL

## 2024-06-20 DIAGNOSIS — Z79.01 LONG TERM (CURRENT) USE OF ANTICOAGULANTS: ICD-10-CM

## 2024-06-20 DIAGNOSIS — Z86.711 HISTORY OF PULMONARY EMBOLISM: ICD-10-CM

## 2024-06-20 DIAGNOSIS — D68.59 THROMBOPHILIA (H): Primary | ICD-10-CM

## 2024-06-20 LAB — INR HOME MONITORING: 2 RATIO (ref 2–3)

## 2024-06-20 NOTE — PROGRESS NOTES
ANTICOAGULATION MANAGEMENT     Matt Rodriguez 69 year old male is on warfarin with therapeutic INR result. (Goal INR 2.0-3.0)    Recent labs: (last 7 days)     06/20/24  1133   INR 2       ASSESSMENT     Source(s): Chart Review and Patient/Caregiver Call     Warfarin doses taken: Missed dose(s) may be affecting INR-he realized he missed his Tuesday dose so took it today already.  Diet:  traveling for work may be affecting diet and INR  Medication/supplement changes: None noted  New illness, injury, or hospitalization: Yes: had some headaches but thinks with a crazy week. He will keep monitoring them.  Signs or symptoms of bleeding or clotting: No  Previous result: Supratherapeutic  Additional findings: None       PLAN     Recommended plan for temporary change(s) affecting INR     Dosing Instructions: He already took more warfarin today. Continue your current warfarin dose with next INR in 1 week       Summary  As of 6/20/2024      Full warfarin instructions:  6/20: 7.5 mg; Otherwise 2.5 mg every Tue; 5 mg all other days   Next INR check:  6/27/2024               Telephone call with Matt who agrees to plan and repeated back plan correctly    Patient to recheck with home meter    Education provided:   Please call back if any changes to your diet, medications or how you've been taking warfarin  Goal range and lab monitoring: goal range and significance of current result and Importance of therapeutic range    Plan made per [facility] anticoagulation protocol    Alexandria Bernardo, RN  Anticoagulation Clinic  6/20/2024    _______________________________________________________________________     Anticoagulation Episode Summary       Current INR goal:  2.0-3.0   TTR:  80.6% (1 y)   Target end date:  Indefinite   Send INR reminders to:  [facility]    Indications    Thrombophilia (H24) [D68.59]  Long term (current) use of anticoagulants [Z79.01]  History of pulmonary embolism [Z86.711]             Comments:  [employer]  home meter, MBANNE-MARIE             Anticoagulation Care Providers       Provider Role Specialty Phone number    Dl Allen MD Referring Family Medicine [phone]

## 2024-06-24 NOTE — PROGRESS NOTES
Here is a copy of the progress note from your recent genetic counseling visit to the [facility] on Date: 2024.    PROGRESS NOTE:Matt was seen for genetic counseling due to his family history of amyloidosis.  I had the opportunity to talk with Matt today to discuss the genetic component of amyloidosis and testing options available to him.     MEDICAL HISTORY: Matt reports that he had an unprovoked pulmonary embolism in 2018. Previous thrombophilia testing showed that he does not have factor V Leiden or the prothrombin gene mutation. He also does not have protein C, protein S, or antithrombin deficiency.     He reports skeletal issues with his back and hip.  He reports edema in his legs and floaters in his eye.  He has possible carpal tunnel syndrome but has not had surgery for this.  He has not had a cardiac evaluation for amyloidosis and reports no history of neuropathy, GI issues, kidney disease, or autonomic dysfunction.     FAMILY HISTORY:A detailed family history was obtained today and was significant for the following cardiac history:    Brother (66) had a stroke at 65 after carpal tunnel surgery.  He was diagnosed with amyloidosis because of an enlarged heart, which was originally thought to have been due to history of a hole in his heart which was repaired surgery in [facility].  Brother (78) has AFib and potassium issues.  Brother (64) has a history of blood clot with travel and carpal tunnel syndrome.  Brother (76) with a hole in his heart no repaired but on coumadin. He is positive for a breast cancer gene.  Sister  at 58 with pancreatic cancer.  She had three children die in early infancy or late pregnancy.  Sister (73) with history of depression.  Father  at 96 with history of Afib, heart attack in his 60's requiring a stent, carpal tunnel syndrome, tingling in hands and feet and bicep separation.  Two paternal aunts  with cancer.  Four  "paternal aunts/uncles  - one in infancy, one at 9 years, one with TB at 29, and one with no known info.  Six paternal aunts/uncles are living, some have heart issues.  Paternal grandparents  in their 80's but no details are known.  Mother  at 95 years with a history of blood clot and potassium issue.  Nine maternal aunts/uncle are  - no known heart issues.  Two aunts/uncles  in childhood.  Maternal uncle in good health.  Maternal grandfather  70's. Grandmother in her 80's, no more details known.  Two daughters (35 and 32) in good health.  There is no additional history of amyloidosis, cardiomyopathy, arrhythmias, heart attacks, fainting, sudden cardiac death, genetic conditions, or birth defects. (Scanned pedigree may be under media tab)    DISCUSSION:  Amyloidosis is a slowly progressive condition characterized by the buildup of abnormal deposits of a protein called amyloid in the body's organs and tissues. It can be a difficult condition to diagnose and understand because it can appear in different organs and have several causes.  One particular form is transthyretin (TTR) amyloidosis and it can affect different organs.  People with cardiac TTR amyloidosis may have an abnormal heartbeat (arrhythmia), an enlarged heart (cardiomegaly), or sudden and abrupt increase in blood pressure when a person stands up (orthostatic hypertension). These abnormalities can lead to progressive heart failure and death.  Occasionally, people with the cardiac form also have mild loss of sensation in the extremities (peripheral neuropathy), carpal tunnel, and autonomic dysfunction, including involuntary bodily functions.    TTR amyloid can occur as wild type or hereditary.  Wild type means that the TTR gene is made from the \"normal\" instructions and does NOT have a mutation. In wild type TTR the build up of the protein occurs over time.  Hereditary amyloidosis is caused by mutations in the TTR gene, which " means the protein is made from incorrect instructions.  Hereditary TTR can be passed along in families.      Reviewed autosomal dominant (AD) inheritance pattern associated with TTR mutations, including the 50% risk for recurrence.  Explained that TTR gene mutations can be associated with variable expressivity, meaning that individuals who carry a gene mutation can have varying symptoms as well as onset and severity. In addition, symptoms are also more common in men, therefore women are less likely to be affected even if they carry the gene mutation.    Reviewed importance of learning brothers diagnosis so we can accurately assess Matt's risk.  If his brother has TTR amyloidosis, genetic testing could be initiated in his brother but due to the high detection rate of mutations (>99%), testing could be initiated in Matt.      Reviewed capabilities, limitations, and logistics of testing.  DNA sample via saliva or blood is collected and sent to testing lab for evaluation of selected genes. The results could directly impact care and treatment. Test results take approximately 2-4 weeks on average. The labs have teamed up with companies to help increase knowledge and understanding of amyloidosis and are currently offering testing at no cost to patients.     Explained three possible outcomes of genetic testing including: positive identification of a mutation, no mutation identified, and identification of a variant of unknown significance (VUS). If a mutation is identified, presymptomatic testing would be available to at risk family members. If no mutation is identified, it does not rule out the possibility of a genetic component to this disease. Family members could still be at risk for developing the same condition. If a VUS is identified, it is unclear if the mutation is disease causing or just a normal variation. It may take time and possibly additional testing to determine the meaning of a VUS result.     Discussed  pros and cons of genetic testing. Explained that results could determine the cause for amyloidosis and allow for direction in treatment studies and protocols.  If a mutation is identified, presymptomatic testing is available to all at risk relatives. Recommend clinical evaluation for all first degree relatives (parents, siblings, and children) of an affected individual regardless of decision to pursue genetic testing.      All questions answered at this time.    PLAN:Matt plans to talk with his family to learn more specifics about their diagnosis and if any genetic testing has been performed in his brother.  If he decides to pursue genetic testing he will reach out to me.    TOTAL TIME SPENT IN COUNSELIN minutes    Bridgette Bach MS, [facility]  Licensed, Certified Genetic Counselor  [facility]

## 2024-06-25 ENCOUNTER — OFFICE VISIT (OUTPATIENT)
Dept: CARDIOLOGY | Facility: CLINIC | Age: 69
End: 2024-06-25
Attending: GENETIC COUNSELOR, MS
Payer: COMMERCIAL

## 2024-06-25 DIAGNOSIS — I43 FAMILIAL AMYLOID HEART DISEASE (H): ICD-10-CM

## 2024-06-25 DIAGNOSIS — E85.4 FAMILIAL AMYLOID HEART DISEASE (H): ICD-10-CM

## 2024-06-25 PROCEDURE — 96040 HC GENETIC COUNSELING, EACH 30 MINUTES: CPT | Performed by: GENETIC COUNSELOR, MS

## 2024-06-25 NOTE — LETTER
2024      RE: Matt Rodriguez  [address] [place] [address]       Dear Colleague,    Thank you for the opportunity to participate in the care of your patient, Matt Rodriguez, at the [facility]. Please see a copy of my visit note below.    Here is a copy of the progress note from your recent genetic counseling visit to [facility] on Date: 2024.    PROGRESS NOTE:Matt was seen for genetic counseling due to his family history of amyloidosis.  I had the opportunity to talk with Matt today to discuss the genetic component of amyloidosis and testing options available to him.     MEDICAL HISTORY: Matt reports that he had an unprovoked pulmonary embolism in 2018. Previous thrombophilia testing showed that he does not have factor V Leiden or the prothrombin gene mutation. He also does not have protein C, protein S, or antithrombin deficiency.     He reports skeletal issues with his back and hip.  He reports edema in his legs and floaters in his eye.  He has possible carpal tunnel syndrome but has not had surgery for this.  He has not had a cardiac evaluation for amyloidosis and reports no history of neuropathy, GI issues, kidney disease, or autonomic dysfunction.     FAMILY HISTORY:A detailed family history was obtained today and was significant for the following cardiac history:    Brother (66) had a stroke at 65 after carpal tunnel surgery.  He was diagnosed with amyloidosis because of an enlarged heart, which was originally thought to have been due to history of a hole in his heart which was repaired surgery in [employer].  Brother (78) has AFib and potassium issues.  Brother (64) has a history of blood clot with travel and carpal tunnel syndrome.  Brother (76) with a hole in his heart no repaired but on coumadin. He is positive for a breast cancer gene.  Sister   at 58 with pancreatic cancer.  She had three children die in early infancy or late pregnancy.  Sister (73) with history of depression.  Father  at 96 with history of Afib, heart attack in his 60's requiring a stent, carpal tunnel syndrome, tingling in hands and feet and bicep separation.  Two paternal aunts  with cancer.  Four paternal aunts/uncles  - one in infancy, one at 9 years, one with TB at 29, and one with no known info.  Six paternal aunts/uncles are living, some have heart issues.  Paternal grandparents  in their 80's but no details are known.  Mother  at 95 years with a history of blood clot and potassium issue.  Nine maternal aunts/uncle are  - no known heart issues.  Two aunts/uncles  in childhood.  Maternal uncle in good health.  Maternal grandfather  70's. Grandmother in her 80's, no more details known.  Two daughters (35 and 32) in good health.  There is no additional history of amyloidosis, cardiomyopathy, arrhythmias, heart attacks, fainting, sudden cardiac death, genetic conditions, or birth defects. (Scanned pedigree may be under media tab)    DISCUSSION:  Amyloidosis is a slowly progressive condition characterized by the buildup of abnormal deposits of a protein called amyloid in the body's organs and tissues. It can be a difficult condition to diagnose and understand because it can appear in different organs and have several causes.  One particular form is transthyretin (TTR) amyloidosis and it can affect different organs.  People with cardiac TTR amyloidosis may have an abnormal heartbeat (arrhythmia), an enlarged heart (cardiomegaly), or sudden and abrupt increase in blood pressure when a person stands up (orthostatic hypertension). These abnormalities can lead to progressive heart failure and death.  Occasionally, people with the cardiac form also have mild loss of sensation in the extremities (peripheral neuropathy), carpal tunnel, and autonomic  "dysfunction, including involuntary bodily functions.    TTR amyloid can occur as wild type or hereditary.  Wild type means that the TTR gene is made from the \"normal\" instructions and does NOT have a mutation. In wild type TTR the build up of the protein occurs over time.  Hereditary amyloidosis is caused by mutations in the TTR gene, which means the protein is made from incorrect instructions.  Hereditary TTR can be passed along in families.      Reviewed autosomal dominant (AD) inheritance pattern associated with TTR mutations, including the 50% risk for recurrence.  Explained that TTR gene mutations can be associated with variable expressivity, meaning that individuals who carry a gene mutation can have varying symptoms as well as onset and severity. In addition, symptoms are also more common in men, therefore women are less likely to be affected even if they carry the gene mutation.    Reviewed importance of learning brothers diagnosis so we can accurately assess Matt's risk.  If his brother has TTR amyloidosis, genetic testing could be initiated in his brother but due to the high detection rate of mutations (>99%), testing could be initiated in Matt.      Reviewed capabilities, limitations, and logistics of testing.  DNA sample via saliva or blood is collected and sent to testing lab for evaluation of selected genes. The results could directly impact care and treatment. Test results take approximately 2-4 weeks on average. The labs have teamed up with companies to help increase knowledge and understanding of amyloidosis and are currently offering testing at no cost to patients.     Explained three possible outcomes of genetic testing including: positive identification of a mutation, no mutation identified, and identification of a variant of unknown significance (VUS). If a mutation is identified, presymptomatic testing would be available to at risk family members. If no mutation is identified, it does not " rule out the possibility of a genetic component to this disease. Family members could still be at risk for developing the same condition. If a VUS is identified, it is unclear if the mutation is disease causing or just a normal variation. It may take time and possibly additional testing to determine the meaning of a VUS result.     Discussed pros and cons of genetic testing. Explained that results could determine the cause for amyloidosis and allow for direction in treatment studies and protocols.  If a mutation is identified, presymptomatic testing is available to all at risk relatives. Recommend clinical evaluation for all first degree relatives (parents, siblings, and children) of an affected individual regardless of decision to pursue genetic testing.      All questions answered at this time.    PLAN:Matt plans to talk with his family to learn more specifics about their diagnosis and if any genetic testing has been performed in his brother.  If he decides to pursue genetic testing he will reach out to me.    TOTAL TIME SPENT IN COUNSELIN minutes    Bridgette Bach MS, [facility] [place]  Licensed, Certified Genetic Counselor  [facility]

## 2024-06-27 NOTE — PATIENT INSTRUCTIONS
"Indication for Genetic Counseling:  Hereditary amyloidosis is a slowly progressive condition characterized by the buildup of abnormal deposits of a protein called amyloid (amyloidosis) in the body's organs and tissues.  The majority of hereditary amyloidosis types are related to the protein transthyretin (TTR). Mutations in the TTR gene can lead to three different forms of amyloidosis, characterized by the organs impacted by the disease.  People with cardiac amyloidosis may have an abnormal heartbeat (arrhythmia), an enlarged heart (cardiomegaly), or  sudden and abrupt increase in blood pressure when a person stands up (orthostatic hypertension). These abnormalities can lead to progressive heart failure and death. Occasionally, people with the cardiac form also have mild loss of sensation in the extremities (peripheral neuropathy)..    Inheritance:  Humans have over 20,000 genes that instruct our bodies how to function.  We have two copies of each gene because we inherit one from our mother and one from our father.  In most cardiac cases with a genetic component, the condition is inherited in an autosomal dominant (AD) pattern.  This means that in order to have the condition, a person needs to inherit a mutation on one copy of a particular gene.  This mutation or pathogenic variant dominates the \"normal\" working copy of the gene.  When an affected individual has children, they can either pass on the \"normal\" copy of the gene or the mutation.  Therefore, children have a 50% chance of inheriting the mutation.  Other family members also have an increased risk but the specific risk depends on the degree of relationship.  Additional inheritance patterns can occur within families and may alter the risk of recurrence.     Testing Options:   Genetic testing is available to assess a panel of genes known to cause this condition.  This test reads through the DNA (sequencing) of these genes to look for spelling mistakes or " mutations that could cause the condition.      There are three types of results you could receive from this test.     -Positive result (mutation/pathogenic variant identified) - confirms diagnosis and provides an answer to why this happened.  In addition, identifying a mutation allows family members to have testing to determine their risk.     -Negative result (mutation not identified) - no genetic changes were identified.  This does not rule out a genetic cause for the condition as the genetic testing only identifies 99% of genetic causes for this condition.    -Variant of uncertain significance (VUS) - a genetic change was identified, but there is not enough information to determine whether it is disease-causing or normal human genetic variation.     Although genetic testing may identify a mutation, it cannot provide information about the severity of symptoms or the progression of disease.  We cannot predict age of onset or severity of symptoms due to reduced penetrance and variable expressivity.    Logistics:   Genetic testing involves collecting a sample of DNA, thru blood, saliva, or cheek cells.  The sample will be sent to a laboratory to extract the DNA and sequence the genes for mutations.  The laboratory will work with your insurance company to determine the out of pocket (OOP) cost and will notify you if the OOP cost is greater than $100.  Remember to ask the lab about financial assistance pricing and self pay options as well.  Sometimes those are much lower than insurance pricing.  When testing is initiated, results take about 2-4 weeks to return. I will contact you over the phone when results are available.     Genetic Information and Nondiscrimination Act:  The Genetic Information and Nondiscrimination Act of 2008 (TOBIN) is a federal law that protects individuals from genetic discrimination in health insurance and employment. Genetic discrimination is defined as the misuse of genetic information. This  law does not address potential discrimination regarding life insurance or disability insurance.      This is especially relevant for at risk individuals who are considering presymptomatic testing.    Screening Recommendations:  Recommend clinical evaluation for all first degree relatives (parents, siblings, and children) of an affected individual regardless of decision to pursue genetic testing.  Clinical evaluation for cardiac amyloidosis should include history, cardiac exam, ECHO, and EKG.  It may also include heart monitoring, additional imaging, biopsy, and exercise treadmill.    Resources:  Amyloidosis Foundation - amyloidosis.org    General   American Heart Association - americanheart.org  Genetics Home Reference - ghr.nlm.nih.gov  Genetic Information and Nondiscrimination Act - ginahelp.org    Contact Information:  Bridgette Bach MS  Licensed Genetic Counselor  [facility]  [facility]    Office:  [phone]  Appointments:  [phone]  Fax: [phone]  Email: [email or web address]

## 2024-07-03 ENCOUNTER — ANTICOAGULATION THERAPY VISIT (OUTPATIENT)
Dept: ANTICOAGULATION | Facility: CLINIC | Age: 69
End: 2024-07-03
Payer: COMMERCIAL

## 2024-07-03 LAB — INR HOME MONITORING: 2.9 RATIO (ref 2–3)

## 2024-07-03 NOTE — PROGRESS NOTES
ANTICOAGULATION MANAGEMENT     Matt Rodriguez 69 year old male is on warfarin with therapeutic INR result. (Goal INR 2.0-3.0)    Recent labs: (last 7 days)     07/03/24  0817   INR 2.9       ASSESSMENT     Source(s): Chart Review and Patient/Caregiver Call     Warfarin doses taken: Warfarin taken as instructed  Diet: No new diet changes identified  Medication/supplement changes: None noted  New illness, injury, or hospitalization: No  Signs or symptoms of bleeding or clotting: No  Previous result: Therapeutic last visit; previously outside of goal range  Additional findings: Hx of MBE-Pt agreed to resume if next INR is therapeutic       PLAN     Recommended plan for no diet, medication or health factor changes affecting INR     Dosing Instructions: Continue your current warfarin dose with next INR in 1 week       Summary  As of 7/3/2024      Full warfarin instructions:  2.5 mg every Tue; 5 mg all other days   Next INR check:  7/10/2024               Telephone call with Matt who verbalizes understanding and agrees to plan    Patient to recheck with home meter    Education provided: Resume manage by exception with home monitor. Continue to submit INR results to home monitor company.You will only be called when your result is out of range. Please call and notify [facility] if new medication started, dose missed, signs or symptoms of bleeding or clotting, or a surgery/procedure is scheduled. Due for next call no later than: 10/1/24.  Contact [phone] with any changes, questions or concerns.     Plan made per [facility] anticoagulation protocol    Ashley Gillespie RN  Anticoagulation Clinic  7/3/2024    _______________________________________________________________________     Anticoagulation Episode Summary       Current INR goal:  2.0-3.0   TTR:  80.6% (1 y)   Target end date:  Indefinite   Send INR reminders to:  [facility] HOME MONITOR[facility]    Indications    Thrombophilia (H24) [D68.59]  Long term (current) use of anticoagulants  [Z79.01]  History of pulmonary embolism [Z86.711]             Comments:  [employer] home meter, MBE             Anticoagulation Care Providers       Provider Role Specialty Phone number    Dl Allen MD Referring Family Medicine [phone]

## 2024-07-10 ENCOUNTER — ANTICOAGULATION THERAPY VISIT (OUTPATIENT)
Dept: ANTICOAGULATION | Facility: CLINIC | Age: 69
End: 2024-07-10
Payer: COMMERCIAL

## 2024-07-10 DIAGNOSIS — Z79.01 LONG TERM (CURRENT) USE OF ANTICOAGULANTS: ICD-10-CM

## 2024-07-10 DIAGNOSIS — D68.59 THROMBOPHILIA (H): Primary | ICD-10-CM

## 2024-07-10 DIAGNOSIS — Z86.711 HISTORY OF PULMONARY EMBOLISM: ICD-10-CM

## 2024-07-10 LAB — INR HOME MONITORING: 2.9 RATIO (ref 2–3)

## 2024-07-10 NOTE — PROGRESS NOTES
ANTICOAGULATION  MANAGEMENT-Home Monitor Managed by Exception    Matt TAY Rodriguez 69 year old male is on warfarin with therapeutic INR result. (Goal INR 2.0-3.0)    Recent labs: (last 7 days)     07/10/24  0855   INR 2.9       Previous INR was Therapeutic  Medication, diet, health changes since last INR:chart reviewed; none identified  Contacted within the last 12 weeks by phone on 7/3/24  Last [facility] referral date: 09/15/2023      LISA     Matt was NOT contacted regarding therapeutic result today per home monitoring policy manage by exception agreement.   Current warfarin dose is to be continued:     Summary  As of 7/10/2024      Full warfarin instructions:  2.5 mg every Tue; 5 mg all other days   Next INR check:  7/17/2024             ?   Virginia Benitez RN  Anticoagulation Clinic  7/10/2024    _______________________________________________________________________     Anticoagulation Episode Summary       Current INR goal:  2.0-3.0   TTR:  80.6% (1 y)   Target end date:  Indefinite   Send INR reminders to:  [facility] HOME MONIT[facility]    Indications    Thrombophilia (H24) [D68.59]  Long term (current) use of anticoagulants [Z79.01]  History of pulmonary embolism [Z86.711]             Comments:  [employer] home meter, MBE             Anticoagulation Care Providers       Provider Role Specialty Phone number    Dl Allen MD Referring Family Medicine [phone]

## 2024-07-17 ENCOUNTER — DOCUMENTATION ONLY (OUTPATIENT)
Dept: ANTICOAGULATION | Facility: CLINIC | Age: 69
End: 2024-07-17
Payer: COMMERCIAL

## 2024-07-17 DIAGNOSIS — Z79.01 LONG TERM (CURRENT) USE OF ANTICOAGULANTS: ICD-10-CM

## 2024-07-17 DIAGNOSIS — Z86.711 HISTORY OF PULMONARY EMBOLISM: ICD-10-CM

## 2024-07-17 DIAGNOSIS — D68.59 THROMBOPHILIA (H): Primary | ICD-10-CM

## 2024-07-17 LAB — INR HOME MONITORING: 2 RATIO (ref 2–3)

## 2024-07-17 NOTE — PROGRESS NOTES
ANTICOAGULATION  MANAGEMENT-Home Monitor Managed by Exception    Matt TAY Rodriguez 69 year old male is on warfarin with therapeutic INR result. (Goal INR 2.0-3.0)    Recent labs: (last 7 days)     07/17/24  1111   INR 2       Previous INR was Therapeutic  Medication, diet, health changes since last INR:chart reviewed; none identified  Contacted within the last 12 weeks by phone on 7/2/24  Due for next call no later than: 10/1/24.   Last [facility] referral date: 09/15/2023      LISA     Matt was NOT contacted regarding therapeutic result today per home monitoring policy manage by exception agreement.   Current warfarin dose is to be continued:     Summary  As of 7/17/2024      Full warfarin instructions:  2.5 mg every Tue; 5 mg all other days   Next INR check:  7/24/2024             ?   Ketty Myers, RN  Anticoagulation Clinic  7/17/2024    _______________________________________________________________________     Anticoagulation Episode Summary       Current INR goal:  2.0-3.0   TTR:  80.9% (1 y)   Target end date:  Indefinite   Send INR reminders to:  [facility] HOME MONITORING    Indications    Thrombophilia (H24) [D68.59]  Long term (current) use of anticoagulants [Z79.01]  History of pulmonary embolism [Z86.711]             Comments:  [employer] home meter, MBE             Anticoagulation Care Providers       Provider Role Specialty Phone number    Dl Allen MD Referring Family Medicine [phone]

## 2024-07-25 ENCOUNTER — DOCUMENTATION ONLY (OUTPATIENT)
Dept: ANTICOAGULATION | Facility: CLINIC | Age: 69
End: 2024-07-25
Payer: COMMERCIAL

## 2024-07-25 DIAGNOSIS — Z79.01 LONG TERM (CURRENT) USE OF ANTICOAGULANTS: ICD-10-CM

## 2024-07-25 DIAGNOSIS — Z86.711 HISTORY OF PULMONARY EMBOLISM: ICD-10-CM

## 2024-07-25 DIAGNOSIS — D68.59 THROMBOPHILIA (H): Primary | ICD-10-CM

## 2024-07-25 LAB — INR HOME MONITORING: 2.8 RATIO (ref 2–3)

## 2024-07-25 NOTE — PROGRESS NOTES
ANTICOAGULATION  MANAGEMENT-Home Monitor Managed by Exception    Matt CROSS Michael 69 year old male is on warfarin with therapeutic INR result. (Goal INR 2.0-3.0)    Recent labs: (last 7 days)     07/25/24  0852   INR 2.8       Previous INR was Therapeutic  Medication, diet, health changes since last INR:chart reviewed; none identified  Contacted within the last 12 weeks by phone on 7/2/24  Due for next call no later than: 10/1/24.   Last [facility] referral date: 09/15/2023      LISA     Matt was NOT contacted regarding therapeutic result today per home monitoring policy manage by exception agreement.   Current warfarin dose is to be continued:     Summary  As of 7/25/2024      Full warfarin instructions:  2.5 mg every Tue; 5 mg all other days   Next INR check:  8/1/2024             ?   Jany Mast RN  Anticoagulation Clinic  7/25/2024    _______________________________________________________________________     Anticoagulation Episode Summary       Current INR goal:  2.0-3.0   TTR:  83.1% (1 y)   Target end date:  Indefinite   Send INR reminders to:  [employer] [facility]    Indications    Thrombophilia (H24) [D68.59]  Long term (current) use of anticoagulants [Z79.01]  History of pulmonary embolism [Z86.711]             Comments:  [employer] home meter, MBE             Anticoagulation Care Providers       Provider Role Specialty Phone number    Dl Allen MD Referring Family Medicine [phone]

## 2024-07-31 ENCOUNTER — DOCUMENTATION ONLY (OUTPATIENT)
Dept: ANTICOAGULATION | Facility: CLINIC | Age: 69
End: 2024-07-31
Payer: COMMERCIAL

## 2024-07-31 DIAGNOSIS — D68.59 THROMBOPHILIA (H): Primary | ICD-10-CM

## 2024-07-31 DIAGNOSIS — Z86.711 HISTORY OF PULMONARY EMBOLISM: ICD-10-CM

## 2024-07-31 DIAGNOSIS — Z79.01 LONG TERM (CURRENT) USE OF ANTICOAGULANTS: ICD-10-CM

## 2024-07-31 LAB — INR HOME MONITORING: 2.9 RATIO (ref 2–3)

## 2024-07-31 NOTE — PROGRESS NOTES
ANTICOAGULATION  MANAGEMENT-Home Monitor Managed by Exception    Matt TAY Rodriguez 69 year old male is on warfarin with therapeutic INR result. (Goal INR 2.0-3.0)    Recent labs: (last 7 days)     07/31/24  0935   INR 2.9       Previous INR was Therapeutic  Medication, diet, health changes since last INR:chart reviewed; none identified  Contacted within the last 12 weeks by phone on 7/3/24  Due for next call no later than: 10/1/24.   Last [facility] referral date: 09/15/2023      LISA     Matt was NOT contacted regarding therapeutic result today per home monitoring policy manage by exception agreement.   Current warfarin dose is to be continued:     Summary  As of 7/31/2024      Full warfarin instructions:  2.5 mg every Tue; 5 mg all other days   Next INR check:  8/7/2024             ?   Zaida Gamez RN  Anticoagulation Clinic  7/31/2024    _______________________________________________________________________     Anticoagulation Episode Summary       Current INR goal:  2.0-3.0   TTR:  84.7% (1 y)   Target end date:  Indefinite   Send INR reminders to:  [employer]    Indications    Thrombophilia (H24) [D68.59]  Long term (current) use of anticoagulants [Z79.01]  History of pulmonary embolism [Z86.711]             Comments:  [employer] home meter, MBE             Anticoagulation Care Providers       Provider Role Specialty Phone number    Dl Allen MD Referring Family Medicine [phone]

## 2024-08-14 ENCOUNTER — ANTICOAGULATION THERAPY VISIT (OUTPATIENT)
Dept: ANTICOAGULATION | Facility: CLINIC | Age: 69
End: 2024-08-14
Payer: COMMERCIAL

## 2024-08-14 DIAGNOSIS — D68.59 THROMBOPHILIA (H): Primary | ICD-10-CM

## 2024-08-14 DIAGNOSIS — Z79.01 LONG TERM (CURRENT) USE OF ANTICOAGULANTS: ICD-10-CM

## 2024-08-14 DIAGNOSIS — Z86.711 HISTORY OF PULMONARY EMBOLISM: ICD-10-CM

## 2024-08-14 LAB — INR HOME MONITORING: 1.8 RATIO (ref 2–3)

## 2024-08-14 NOTE — PROGRESS NOTES
ANTICOAGULATION MANAGEMENT     Matt Rodriguez 69 year old male is on warfarin with subtherapeutic INR result. (Goal INR 2.0-3.0)    Recent labs: (last 7 days)     08/14/24  1042   INR 1.8*       ASSESSMENT     Source(s): Chart Review and Patient/Caregiver Call     Warfarin doses taken: Missed dose(s) may be affecting INR  Diet:  Pt was on vacation this past week and had changes with diet.   Medication/supplement changes: None noted  New illness, injury, or hospitalization: No  Signs or symptoms of bleeding or clotting: No  Previous result: Therapeutic last 2(+) visits  Additional findings: None       PLAN     Recommended plan for temporary change(s) affecting INR     Dosing Instructions: Continue your current warfarin dose with next INR in 1 week       Summary  As of 8/14/2024      Full warfarin instructions:  2.5 mg every Tue; 5 mg all other days   Next INR check:  8/21/2024               Telephone call with Matt who verbalizes understanding and agrees to plan    Patient to recheck with home meter    Education provided: Goal range and lab monitoring: goal range and significance of current result and Importance of therapeutic range  Importance of notifying anticoagulation clinic for: changes in medications; a sooner lab recheck maybe needed    Plan made per [facility] anticoagulation protocol    Sincere Parson RN  Anticoagulation Clinic  8/14/2024    _______________________________________________________________________     Anticoagulation Episode Summary       Current INR goal:  2.0-3.0   TTR:  86.8% (1 y)   Target end date:  Indefinite   Send INR reminders to:  [facility]    Indications    Thrombophilia (H24) [D68.59]  Long term (current) use of anticoagulants [Z79.01]  History of pulmonary embolism [Z86.711]             Comments:  [employer] home meter, MBE             Anticoagulation Care Providers       Provider Role Specialty Phone number    Dl Allen MD Referring Family Medicine [phone]

## 2024-08-21 ENCOUNTER — ANTICOAGULATION THERAPY VISIT (OUTPATIENT)
Dept: ANTICOAGULATION | Facility: CLINIC | Age: 69
End: 2024-08-21
Payer: COMMERCIAL

## 2024-08-21 ENCOUNTER — DOCUMENTATION ONLY (OUTPATIENT)
Dept: ANTICOAGULATION | Facility: CLINIC | Age: 69
End: 2024-08-21
Payer: COMMERCIAL

## 2024-08-21 DIAGNOSIS — D68.59 THROMBOPHILIA (H): Primary | ICD-10-CM

## 2024-08-21 DIAGNOSIS — Z79.01 LONG TERM (CURRENT) USE OF ANTICOAGULANTS: ICD-10-CM

## 2024-08-21 DIAGNOSIS — Z86.711 HISTORY OF PULMONARY EMBOLISM: ICD-10-CM

## 2024-08-21 DIAGNOSIS — Z86.711 HISTORY OF PULMONARY EMBOLISM: Primary | ICD-10-CM

## 2024-08-21 LAB — INR HOME MONITORING: 2.7 RATIO (ref 2–3)

## 2024-08-21 NOTE — PROGRESS NOTES
ANTICOAGULATION MANAGEMENT     Matt Rodriguez 69 year old male is on warfarin with therapeutic INR result. (Goal INR 2.0-3.0)    Recent labs: (last 7 days)     08/21/24  1247   INR 2.7       ASSESSMENT     Source(s): Chart Review  Previous INR was Subtherapeutic most likely due to missed dose.  Medication, diet, health changes since last INR chart reviewed; none identified         PLAN     Recommended plan for no diet, medication or health factor changes affecting INR     Dosing Instructions: Continue your current warfarin dose with next INR in 1 week       Summary  As of 8/21/2024      Full warfarin instructions:  2.5 mg every Tue; 5 mg all other days   Next INR check:  8/28/2024               Detailed voice message left for Matt with dosing instructions and follow up date.     Patient to recheck with home meter    Education provided: Please call back if any changes to your diet, medications or how you've been taking warfarin  Resume manage by exception with home monitor. Continue to submit INR results to home monitor company.You will only be called when your result is out of range. Please call and notify [facility] if new medication started, dose missed, signs or symptoms of bleeding or clotting, or a surgery/procedure is scheduled. Due for next call no later than: 11/19/24.  Contact [phone] with any changes, questions or concerns.     Plan made per [facility] anticoagulation protocol    Zaida Gamez RN  Anticoagulation Clinic  8/21/2024    _______________________________________________________________________     Anticoagulation Episode Summary       Current INR goal:  2.0-3.0   TTR:  86.4% (1 y)   Target end date:  Indefinite   Send INR reminders to:  [facility]    Indications    Thrombophilia (H24) [D68.59]  Long term (current) use of anticoagulants [Z79.01]  History of pulmonary embolism [Z86.711]             Comments:  [employer] home meter, MBE             Anticoagulation Care Providers       Provider Role  Specialty Phone number    Dl Allen MD Referring Family Medicine [phone]

## 2024-08-21 NOTE — PROGRESS NOTES
ANTICOAGULATION CLINIC REFERRAL RENEWAL REQUEST       An annual renewal order is required for all patients referred to [facility].?  Please review and sign the pended referral order for Matt Rodriguez.       ANTICOAGULATION SUMMARY      Warfarin indication(s)   PE and Thrombophillia    Mechanical heart valve present?  NO       Current goal range   INR: 2.0-3.0     Goal appropriate for indication? Goal INR 2-3, standard for indication(s) above     Time in Therapeutic Range (TTR)  (Goal > 60%) 84.6%       Office visit with referring provider's group within last year yes on 10/17/2023       Zaida Gamez RN  [facility]

## 2024-08-28 ENCOUNTER — DOCUMENTATION ONLY (OUTPATIENT)
Dept: ANTICOAGULATION | Facility: CLINIC | Age: 69
End: 2024-08-28
Payer: COMMERCIAL

## 2024-08-28 DIAGNOSIS — Z79.01 LONG TERM (CURRENT) USE OF ANTICOAGULANTS: ICD-10-CM

## 2024-08-28 DIAGNOSIS — Z86.711 HISTORY OF PULMONARY EMBOLISM: ICD-10-CM

## 2024-08-28 DIAGNOSIS — D68.59 THROMBOPHILIA (H): Primary | ICD-10-CM

## 2024-08-28 LAB — INR HOME MONITORING: 2.8 RATIO (ref 2–3)

## 2024-08-28 NOTE — PROGRESS NOTES
ANTICOAGULATION  MANAGEMENT-Home Monitor Managed by Exception    Matt TAY Rodriguez 69 year old male is on warfarin with therapeutic INR result. (Goal INR 2.0-3.0)    Recent labs: (last 7 days)     08/28/24  0740   INR 2.8       Previous INR was Therapeutic  Medication, diet, health changes since last INR:chart reviewed; none identified  Contacted within the last 12 weeks by phone on 8/21/24  Last [facility] referral date: 08/21/2024      LISA Howellian was NOT contacted regarding therapeutic result today per home monitoring policy manage by exception agreement.   Current warfarin dose is to be continued:     Summary  As of 8/28/2024      Full warfarin instructions:  2.5 mg every Tue; 5 mg all other days   Next INR check:  9/4/2024             ?   Zaida Nevarez RN  Anticoagulation Clinic  8/28/2024    _______________________________________________________________________     Anticoagulation Episode Summary       Current INR goal:  2.0-3.0   TTR:  86.4% (1 y)   Target end date:  Indefinite   Send INR reminders to:  [facility]    Indications    Thrombophilia (H24) [D68.59]  Long term (current) use of anticoagulants [Z79.01]  History of pulmonary embolism [Z86.711]             Comments:  [employer] home meter, MBE             Anticoagulation Care Providers       Provider Role Specialty Phone number    Dl Allen MD Referring Family Medicine [phone]

## 2024-09-04 ENCOUNTER — DOCUMENTATION ONLY (OUTPATIENT)
Dept: ANTICOAGULATION | Facility: CLINIC | Age: 69
End: 2024-09-04
Payer: COMMERCIAL

## 2024-09-04 DIAGNOSIS — D68.59 THROMBOPHILIA (H): Primary | ICD-10-CM

## 2024-09-04 DIAGNOSIS — Z79.01 LONG TERM (CURRENT) USE OF ANTICOAGULANTS: ICD-10-CM

## 2024-09-04 DIAGNOSIS — Z86.711 HISTORY OF PULMONARY EMBOLISM: ICD-10-CM

## 2024-09-04 LAB — INR HOME MONITORING: 2.8 RATIO (ref 2–3)

## 2024-09-04 NOTE — PROGRESS NOTES
ANTICOAGULATION  MANAGEMENT-Home Monitor Managed by Exception    Matt TAY Rodriguez 69 year old male is on warfarin with therapeutic INR result. (Goal INR 2.0-3.0)    Recent labs: (last 7 days)     09/04/24  0835   INR 2.8       Previous INR was Therapeutic  Medication, diet, health changes since last INR:chart reviewed; none identified  Contacted within the last 12 weeks by phone on 8/21/24  Last [facility] referral date: 08/21/2024      LISA Howellian was NOT contacted regarding therapeutic result today per home monitoring policy manage by exception agreement.   Current warfarin dose is to be continued:     Summary  As of 9/4/2024      Full warfarin instructions:  2.5 mg every Tue; 5 mg all other days   Next INR check:  9/11/2024             ?   Martha Trujillo RN  Anticoagulation Clinic  9/4/2024    _______________________________________________________________________     Anticoagulation Episode Summary       Current INR goal:  2.0-3.0   TTR:  86.4% (1 y)   Target end date:  Indefinite   Send INR reminders to:  [facility]    Indications    Thrombophilia (H24) [D68.59]  Long term (current) use of anticoagulants [Z79.01]  History of pulmonary embolism [Z86.711]             Comments:  [employer] home meter, MBE             Anticoagulation Care Providers       Provider Role Specialty Phone number    Dl Allen MD Referring Family Medicine [phone]

## 2024-09-11 ENCOUNTER — DOCUMENTATION ONLY (OUTPATIENT)
Dept: ANTICOAGULATION | Facility: CLINIC | Age: 69
End: 2024-09-11
Payer: COMMERCIAL

## 2024-09-11 DIAGNOSIS — Z86.711 HISTORY OF PULMONARY EMBOLISM: ICD-10-CM

## 2024-09-11 DIAGNOSIS — Z79.01 LONG TERM (CURRENT) USE OF ANTICOAGULANTS: ICD-10-CM

## 2024-09-11 DIAGNOSIS — D68.59 THROMBOPHILIA (H): Primary | ICD-10-CM

## 2024-09-11 LAB — INR HOME MONITORING: 2.6 RATIO (ref 2–3)

## 2024-09-11 NOTE — PROGRESS NOTES
ANTICOAGULATION  MANAGEMENT-Home Monitor Managed by Exception    Matt TAY Rodriguez 69 year old male is on warfarin with therapeutic INR result. (Goal INR 2.0-3.0)    Recent labs: (last 7 days)     09/11/24  0829   INR 2.6       Previous INR was Therapeutic  Medication, diet, health changes since last INR:chart reviewed; none identified  Contacted within the last 12 weeks by phone on 8/21/24  Due for next call no later than: 11/19/24.   Last [facility] referral date: 08/21/2024      LISA     Matt was NOT contacted regarding therapeutic result today per home monitoring policy manage by exception agreement.   Current warfarin dose is to be continued:     Summary  As of 9/11/2024      Full warfarin instructions:  2.5 mg every Tue; 5 mg all other days   Next INR check:  9/18/2024             ?   Ketty Myers RN  Anticoagulation Clinic  9/11/2024    _______________________________________________________________________     Anticoagulation Episode Summary       Current INR goal:  2.0-3.0   TTR:  86.4% (1 y)   Target end date:  Indefinite   Send INR reminders to:  [facility]    Indications    Thrombophilia (H24) [D68.59]  Long term (current) use of anticoagulants [Z79.01]  History of pulmonary embolism [Z86.711]             Comments:  [employer] home meter, MBE             Anticoagulation Care Providers       Provider Role Specialty Phone number    Dl Allen MD Referring Family Medicine [phone]

## 2024-09-18 ENCOUNTER — DOCUMENTATION ONLY (OUTPATIENT)
Dept: ANTICOAGULATION | Facility: CLINIC | Age: 69
End: 2024-09-18
Payer: COMMERCIAL

## 2024-09-18 LAB — INR HOME MONITORING: 2.5 RATIO (ref 2–3)

## 2024-09-18 NOTE — PROGRESS NOTES
ANTICOAGULATION  MANAGEMENT-Home Monitor Managed by Exception    Matt TAY Rodriguez 69 year old male is on warfarin with therapeutic INR result. (Goal INR 2.0-3.0)    Recent labs: (last 7 days)     09/18/24  0840   INR 2.5       Previous INR was Therapeutic  Medication, diet, health changes since last INR:chart reviewed; none identified  Contacted within the last 12 weeks by phone on 8/21/24  Last [facility] referral date: 08/21/2024      LISA Howellian was NOT contacted regarding therapeutic result today per home monitoring policy manage by exception agreement.   Current warfarin dose is to be continued:     Summary  As of 9/18/2024      Full warfarin instructions:  2.5 mg every Tue; 5 mg all other days   Next INR check:  9/25/2024             ?   Martha Trujillo RN  Anticoagulation Clinic  9/18/2024    _______________________________________________________________________     Anticoagulation Episode Summary       Current INR goal:  2.0-3.0   TTR:  86.4% (1 y)   Target end date:  Indefinite   Send INR reminders to:  [facility]    Indications    Thrombophilia (H24) [D68.59]  Long term (current) use of anticoagulants [Z79.01]  History of pulmonary embolism [Z86.711]             Comments:  [employer] home meter, MBE             Anticoagulation Care Providers       Provider Role Specialty Phone number    Dl Allen MD Referring Family Medicine [phone]

## 2024-09-25 ENCOUNTER — ANTICOAGULATION THERAPY VISIT (OUTPATIENT)
Dept: ANTICOAGULATION | Facility: CLINIC | Age: 69
End: 2024-09-25
Payer: COMMERCIAL

## 2024-09-25 ENCOUNTER — MYC MEDICAL ADVICE (OUTPATIENT)
Dept: ANTICOAGULATION | Facility: CLINIC | Age: 69
End: 2024-09-25
Payer: COMMERCIAL

## 2024-09-25 DIAGNOSIS — Z86.711 HISTORY OF PULMONARY EMBOLISM: ICD-10-CM

## 2024-09-25 DIAGNOSIS — Z79.01 LONG TERM (CURRENT) USE OF ANTICOAGULANTS: ICD-10-CM

## 2024-09-25 DIAGNOSIS — D68.59 THROMBOPHILIA (H): Primary | ICD-10-CM

## 2024-09-25 LAB — INR HOME MONITORING: 3.9 RATIO (ref 2–3)

## 2024-09-25 NOTE — TELEPHONE ENCOUNTER
FYI - Status Update    Who is Calling: patient    Update: PT wanted to notify Carmita he received her message    Does caller want a call/response back: No

## 2024-09-25 NOTE — PROGRESS NOTES
ANTICOAGULATION MANAGEMENT     Matt Rodriguez 69 year old male is on warfarin with supratherapeutic INR result. (Goal INR 2.0-3.0)    Recent labs: (last 7 days)     09/25/24  0915   INR 3.9*       ASSESSMENT     Source(s): Chart Review and Patient/Caregiver Call     Warfarin doses taken: Warfarin taken as instructed  Diet: No new diet changes identified, had 2 beers on Friday. Notes traveling last week.  Medication/supplement changes: None noted  New illness, injury, or hospitalization: No  Signs or symptoms of bleeding or clotting: yes hemorrhoids  Previous result: Therapeutic last 2(+) visits  Additional findings: None       PLAN     Recommended plan for temporary change(s) affecting INR     Dosing Instructions: partial hold then continue your current warfarin dose with next INR in 1 week       Summary  As of 9/25/2024      Full warfarin instructions:  9/25: 2.5 mg; Otherwise 2.5 mg every Tue; 5 mg all other days   Next INR check:  10/2/2024               Detailed voice message left for Matt with dosing instructions and follow up date.   Sent [employer] message with dosing and follow up instructions    Patient to recheck with home meter    Education provided: Contact [phone] with any changes, questions or concerns.     Plan made with [facility] Pharmacist Jasmyne Quiles, RN  9/25/2024  Anticoagulation Clinic  [facility] for routing messages: p ANTICOAG HOME MONITORING  [facility] patient phone line: [phone]        _______________________________________________________________________     Anticoagulation Episode Summary       Current INR goal:  2.0-3.0   TTR:  87.0% (1 y)   Target end date:  Indefinite   Send INR reminders to:  [facility]IT[facility]    Indications    Thrombophilia (H24) [D68.59]  Long term (current) use of anticoagulants [Z79.01]  History of pulmonary embolism [Z86.711]             Comments:  [employer] home meter, MBE             Anticoagulation Care Providers       Provider Role  Specialty Phone number    Dl Allen MD Referring Family Medicine [phone]

## 2024-09-27 ENCOUNTER — TELEPHONE (OUTPATIENT)
Dept: ANTICOAGULATION | Facility: CLINIC | Age: 69
End: 2024-09-27
Payer: COMMERCIAL

## 2024-09-27 NOTE — TELEPHONE ENCOUNTER
ACN called patient back and he reported that he was eating bags of sunkist and triscut while he was travelling last week and is wondering if that could have affected his last INR.Made aware that that alcohol intake prior to INR check most likely affected his result .    Stated that his eye MD recommended for him to start Centrum supplement - advised to update [facility] on how much vit k component of the one he will start taking and made aware to keep consistent of the same brand moving forward and update [facility] for changes.    He has no other concerns at this time.

## 2024-10-03 ENCOUNTER — ANTICOAGULATION THERAPY VISIT (OUTPATIENT)
Dept: ANTICOAGULATION | Facility: CLINIC | Age: 69
End: 2024-10-03
Payer: COMMERCIAL

## 2024-10-03 DIAGNOSIS — Z79.01 LONG TERM (CURRENT) USE OF ANTICOAGULANTS: ICD-10-CM

## 2024-10-03 DIAGNOSIS — Z86.711 HISTORY OF PULMONARY EMBOLISM: ICD-10-CM

## 2024-10-03 DIAGNOSIS — D68.59 THROMBOPHILIA (H): Primary | ICD-10-CM

## 2024-10-03 LAB — INR HOME MONITORING: 2.2 RATIO (ref 2–3)

## 2024-10-03 NOTE — PROGRESS NOTES
ANTICOAGULATION MANAGEMENT     Matt Rodriguez 69 year old male is on warfarin with therapeutic INR result. (Goal INR 2.0-3.0)    Recent labs: (last 7 days)     10/03/24  0750   INR 2.2       ASSESSMENT     Source(s): Chart Review and Patient/Caregiver Call     Warfarin doses taken: Warfarin taken as instructed  Diet: No new diet changes identified  Medication/supplement changes: None noted  New illness, injury, or hospitalization: No  Signs or symptoms of bleeding or clotting: No  Previous result: Supratherapeutic  Additional findings: None       PLAN     Recommended plan for no diet, medication or health factor changes affecting INR     Dosing Instructions: Continue your current warfarin dose with next INR in 1 week       Summary  As of 10/3/2024      Full warfarin instructions:  2.5 mg every Tue; 5 mg all other days   Next INR check:  10/9/2024               Telephone call with Matt who verbalizes understanding and agrees to plan    Patient to recheck with home meter    Education provided: Please call back if any changes to your diet, medications or how you've been taking warfarin  Resume manage by exception with home monitor. Continue to submit INR results to home monitor company.You will only be called when your result is out of range. Please call and notify [facility] if new medication started, dose missed, signs or symptoms of bleeding or clotting, or a surgery/procedure is scheduled. Due for next call no later than: 1/1/25.  Contact [phone] with any changes, questions or concerns.     Plan made per [facility] anticoagulation protocol    Carmita Quiles RN  10/3/2024  Anticoagulation Clinic  [facility] for routing messages: john [employer] [facility]  [facility] patient phone line: [phone]        _______________________________________________________________________     Anticoagulation Episode Summary       Current INR goal:  2.0-3.0   TTR:  88.0% (1 y)   Target end date:  Indefinite   Send INR reminders to:   [facility]AG [facility]ITOR[facility]    Indications    Thrombophilia (H) [D68.59]  Long term (current) use of anticoagulants [Z79.01]  History of pulmonary embolism [Z86.711]             Comments:  [employer] home meter, [place]OU[place]             Anticoagulation Care Providers       Provider Role Specialty Phone number    Dl Allen MD Referring Family Medicine [phone]

## 2024-10-09 ENCOUNTER — DOCUMENTATION ONLY (OUTPATIENT)
Dept: ANTICOAGULATION | Facility: CLINIC | Age: 69
End: 2024-10-09
Payer: COMMERCIAL

## 2024-10-09 DIAGNOSIS — Z79.01 LONG TERM (CURRENT) USE OF ANTICOAGULANTS: ICD-10-CM

## 2024-10-09 DIAGNOSIS — Z86.711 HISTORY OF PULMONARY EMBOLISM: ICD-10-CM

## 2024-10-09 DIAGNOSIS — D68.59 THROMBOPHILIA (H): Primary | ICD-10-CM

## 2024-10-09 LAB — INR HOME MONITORING: 2 RATIO (ref 2–3)

## 2024-10-09 NOTE — PROGRESS NOTES
ANTICOAGULATION  MANAGEMENT-Home Monitor Managed by Exception    Matt TAY Rodriguez 69 year old male is on warfarin with therapeutic INR result. (Goal INR 2.0-3.0)    Recent labs: (last 7 days)     10/09/24  0844   INR 2       Previous INR was Therapeutic  Medication, diet, health changes since last INR:chart reviewed; none identified  Contacted within the last 12 weeks by phone on   10/3/24   Due for next call no later than: 1/1/25.   Last [facility] referral date: 08/21/2024      LISA     Matt was NOT contacted regarding therapeutic result today per home monitoring policy manage by exception agreement.   Current warfarin dose is to be continued:     Summary  As of 10/9/2024      Full warfarin instructions:  2.5 mg every Tue; 5 mg all other days   Next INR check:  10/16/2024             ?   Jany Mast RN  Anticoagulation Clinic  10/9/2024    _______________________________________________________________________     Anticoagulation Episode Summary       Current INR goal:  2.0-3.0   TTR:  88.7% (1 y)   Target end date:  Indefinite   Send INR reminders to:  [facility]    Indications    Thrombophilia (H) [D68.59]  Long term (current) use of anticoagulants [Z79.01]  History of pulmonary embolism [Z86.711]             Comments:  [employer] home meter, MBE             Anticoagulation Care Providers       Provider Role Specialty Phone number    Dl Allen MD Referring Family Medicine [phone]

## 2024-10-16 ENCOUNTER — TELEPHONE (OUTPATIENT)
Dept: FAMILY MEDICINE | Facility: CLINIC | Age: 69
End: 2024-10-16
Payer: COMMERCIAL

## 2024-10-16 ENCOUNTER — ANTICOAGULATION THERAPY VISIT (OUTPATIENT)
Dept: ANTICOAGULATION | Facility: CLINIC | Age: 69
End: 2024-10-16
Payer: COMMERCIAL

## 2024-10-16 DIAGNOSIS — D68.59 THROMBOPHILIA (H): Primary | ICD-10-CM

## 2024-10-16 DIAGNOSIS — Z79.01 LONG TERM (CURRENT) USE OF ANTICOAGULANTS: ICD-10-CM

## 2024-10-16 DIAGNOSIS — Z86.711 HISTORY OF PULMONARY EMBOLISM: ICD-10-CM

## 2024-10-16 LAB — INR HOME MONITORING: 1.9 RATIO (ref 2–3)

## 2024-10-16 NOTE — TELEPHONE ENCOUNTER
General Call    Contacts       Contact Date/Time Type Contact Phone/Fax    10/16/2024 04:28 PM CDT Phone (Incoming) Matt Rodriguez (Self) [phone] (H)          Reason for Call:  Patient is returning the call from INR     What are your questions or concerns:  INR nurse did not answer. Please call patient back.     Date of last appointment with provider: na    Could we send this information to you in [employer] or would you prefer to receive a phone call?:   Patient would prefer a phone call   Okay to leave a detailed message?: Yes at Home number on file [phone] (home)

## 2024-10-16 NOTE — PROGRESS NOTES
ANTICOAGULATION MANAGEMENT     Matt TAY Amadorcristine 69 year old male is on warfarin with subtherapeutic INR result. (Goal INR 2.0-3.0)    Recent labs: (last 7 days)     10/16/24  0747   INR 1.9*       ASSESSMENT     Source(s): Chart Review and Patient/Caregiver Call     Warfarin doses taken: Warfarin taken as instructed  Diet: Trying to get in more greens, ongoing  Medication/supplement changes: Yes, started Multivitamin with Vit K  New illness, injury, or hospitalization: No   Signs or symptoms of bleeding or clotting: No  Previous result: Therapeutic last 2(+) visits  Additional findings: None       PLAN     Recommended plan for ongoing change(s) affecting INR     Dosing Instructions: Increase your warfarin dose (7.7% change) with next INR in 9 days       Summary  As of 10/16/2024      Full warfarin instructions:  5 mg every day   Next INR check:  10/25/2024               Telephone call with Matt who agrees to plan and repeated back plan correctly    Patient to recheck with home meter    Education provided: Please call back if any changes to your diet, medications or how you've been taking warfarin  Goal range and lab monitoring: goal range and significance of current result    Plan made per [facility] anticoagulation protocol    Martha Trujillo RN  10/16/2024  Anticoagulation Clinic  [employer] for routing messages: p ANTICOAG HOME MONITORING  [facility] patient phone line: [phone]        _______________________________________________________________________     Anticoagulation Episode Summary       Current INR goal:  2.0-3.0   TTR:  87.3% (1 y)   Target end date:  Indefinite   Send INR reminders to:  ANTICOAG HOME MONITORING    Indications    Thrombophilia (H) [D68.59]  Long term (current) use of anticoagulants [Z79.01]  History of pulmonary embolism [Z86.711]             Comments:  [employer] home meter, MBANNE-MARIE             Anticoagulation Care Providers       Provider Role Specialty Phone number    Dl Allen MD Referring  Family Medicine [phone]

## 2024-10-25 ENCOUNTER — ANTICOAGULATION THERAPY VISIT (OUTPATIENT)
Dept: ANTICOAGULATION | Facility: CLINIC | Age: 69
End: 2024-10-25
Payer: COMMERCIAL

## 2024-10-25 DIAGNOSIS — D68.59 THROMBOPHILIA (H): Primary | ICD-10-CM

## 2024-10-25 DIAGNOSIS — Z86.711 HISTORY OF PULMONARY EMBOLISM: ICD-10-CM

## 2024-10-25 DIAGNOSIS — Z79.01 LONG TERM (CURRENT) USE OF ANTICOAGULANTS: ICD-10-CM

## 2024-10-25 LAB — INR HOME MONITORING: 1.9 RATIO (ref 2–3)

## 2024-10-25 NOTE — PROGRESS NOTES
ANTICOAGULATION MANAGEMENT     Matt Rodriguez 69 year old male is on warfarin with subtherapeutic INR result. (Goal INR 2.0-3.0)    Recent labs: (last 7 days)     10/25/24  1027   INR 1.9*       ASSESSMENT     Source(s): Chart Review and Patient/Caregiver Call     Warfarin doses taken: Warfarin taken as instructed  Diet: No new diet changes identified  Medication/supplement changes: None noted  New illness, injury, or hospitalization: No  Signs or symptoms of bleeding or clotting: No  Previous result: Subtherapeutic  Additional findings: None       PLAN     Recommended plan for no diet, medication or health factor changes affecting INR     Dosing Instructions: Increase your warfarin dose (7% change) with next INR in 1 week       Summary  As of 10/25/2024      Full warfarin instructions:  7.5 mg every Fri; 5 mg all other days   Next INR check:  11/1/2024               Telephone call with Matt who verbalizes understanding and agrees to plan    Patient to recheck with home meter    Education provided: Please call back if any changes to your diet, medications or how you've been taking warfarin  Symptom monitoring: monitoring for bleeding signs and symptoms, monitoring for clotting signs and symptoms, and monitoring for stroke signs and symptoms    Plan made per [facility] anticoagulation protocol    Zaida Nevarez RN  10/25/2024  Anticoagulation Clinic  [employer] for routing messages: p ANTICOAG HOME MONITORING  [facility] patient phone line: [phone]        _______________________________________________________________________     Anticoagulation Episode Summary       Current INR goal:  2.0-3.0   TTR:  87.3% (1 y)   Target end date:  Indefinite   Send INR reminders to:  ANTICOAG HOME MONITORING    Indications    Thrombophilia (H) [D68.59]  Long term (current) use of anticoagulants [Z79.01]  History of pulmonary embolism [Z86.711]             Comments:  [employer] home meter, MBE             Anticoagulation Care Providers        Provider Role Specialty Phone number    Dl Allen MD Referring Family Medicine [phone]

## 2024-10-31 ENCOUNTER — ANTICOAGULATION THERAPY VISIT (OUTPATIENT)
Dept: ANTICOAGULATION | Facility: CLINIC | Age: 69
End: 2024-10-31
Payer: COMMERCIAL

## 2024-10-31 DIAGNOSIS — Z79.01 LONG TERM (CURRENT) USE OF ANTICOAGULANTS: ICD-10-CM

## 2024-10-31 DIAGNOSIS — Z86.711 HISTORY OF PULMONARY EMBOLISM: ICD-10-CM

## 2024-10-31 DIAGNOSIS — D68.59 THROMBOPHILIA (H): Primary | ICD-10-CM

## 2024-10-31 LAB — INR HOME MONITORING: 2.2 RATIO (ref 2–3)

## 2024-10-31 NOTE — PROGRESS NOTES
ANTICOAGULATION MANAGEMENT     Matt Rodriguez 69 year old male is on warfarin with therapeutic INR result. (Goal INR 2.0-3.0)    Recent labs: (last 7 days)     10/31/24  0755   INR 2.2       ASSESSMENT     Source(s): Chart Review and Patient/Caregiver Call          PLAN     Recommended plan for no diet, medication or health factor changes affecting INR     Dosing Instructions: Continue your current warfarin dose with next INR in 1 week       Summary  As of 10/31/2024      Full warfarin instructions:  7.5 mg every Fri; 5 mg all other days   Next INR check:  11/7/2024               Detailed voice message left for Matt with dosing instructions and follow up date.     Patient to recheck with home meter    Education provided: Please call back if any changes to your diet, medications or how you've been taking warfarin  Resume manage by exception with home monitor. Continue to submit INR results to home monitor company.You will only be called when your result is out of range. Please call and notify [facility] if new medication started, dose missed, signs or symptoms of bleeding or clotting, or a surgery/procedure is scheduled. Due for next call no later than: 1/29/25.    Plan made per [facility] anticoagulation protocol    Jany Mast RN  10/31/2024  Anticoagulation Clinic  [facility] for routing messages: p ANTICOAG HOME MONITORING  [facility] patient phone line: [phone]        _______________________________________________________________________     Anticoagulation Episode Summary       Current INR goal:  2.0-3.0   TTR:  87.4% (1 y)   Target end date:  Indefinite   Send INR reminders to:  ANTICOAG HOME MONITORING    Indications    Thrombophilia (H) [D68.59]  Long term (current) use of anticoagulants [Z79.01]  History of pulmonary embolism [Z86.711]             Comments:  [employer] home meter, MBE             Anticoagulation Care Providers       Provider Role Specialty Phone number    Dl Allen MD Referring Family Medicine  [phone]

## 2024-11-08 ENCOUNTER — DOCUMENTATION ONLY (OUTPATIENT)
Dept: ANTICOAGULATION | Facility: CLINIC | Age: 69
End: 2024-11-08
Payer: COMMERCIAL

## 2024-11-08 DIAGNOSIS — Z86.711 HISTORY OF PULMONARY EMBOLISM: ICD-10-CM

## 2024-11-08 DIAGNOSIS — D68.59 THROMBOPHILIA (H): Primary | ICD-10-CM

## 2024-11-08 DIAGNOSIS — Z79.01 LONG TERM (CURRENT) USE OF ANTICOAGULANTS: ICD-10-CM

## 2024-11-08 LAB — INR HOME MONITORING: 2.5 RATIO (ref 2–3)

## 2024-11-08 NOTE — PROGRESS NOTES
ANTICOAGULATION  MANAGEMENT-Home Monitor Managed by Exception    Matt TAY Rodriguez 69 year old male is on warfarin with therapeutic INR result. (Goal INR 2.0-3.0)    Recent labs: (last 7 days)     11/08/24  0745   INR 2.5       Previous INR was Therapeutic  Medication, diet, health changes since last INR:chart reviewed; none identified  Contacted within the last 12 weeks by phone on 10/31/24  Last [facility] referral date: 08/21/2024      LISA Howellian was NOT contacted regarding therapeutic result today per home monitoring policy manage by exception agreement.   Current warfarin dose is to be continued:     Summary  As of 11/8/2024      Full warfarin instructions:  7.5 mg every Fri; 5 mg all other days   Next INR check:  11/15/2024             ?   Martha Trujillo RN  Anticoagulation Clinic  11/8/2024    _______________________________________________________________________     Anticoagulation Episode Summary       Current INR goal:  2.0-3.0   TTR:  88.6% (1 y)   Target end date:  Indefinite   Send INR reminders to:  ANTICOGUILHERME HOME MONITORING    Indications    Thrombophilia (H) [D68.59]  Long term (current) use of anticoagulants [Z79.01]  History of pulmonary embolism [Z86.711]             Comments:  [employer] home meter, MBE             Anticoagulation Care Providers       Provider Role Specialty Phone number    Dl Allen MD Referring Family Medicine [phone]

## 2024-11-15 ENCOUNTER — DOCUMENTATION ONLY (OUTPATIENT)
Dept: ANTICOAGULATION | Facility: CLINIC | Age: 69
End: 2024-11-15
Payer: COMMERCIAL

## 2024-11-15 DIAGNOSIS — Z79.01 LONG TERM (CURRENT) USE OF ANTICOAGULANTS: ICD-10-CM

## 2024-11-15 DIAGNOSIS — D68.59 THROMBOPHILIA (H): Primary | ICD-10-CM

## 2024-11-15 DIAGNOSIS — Z86.711 HISTORY OF PULMONARY EMBOLISM: ICD-10-CM

## 2024-11-15 LAB — INR HOME MONITORING: 2.5 RATIO (ref 2–3)

## 2024-11-15 NOTE — PROGRESS NOTES
ANTICOAGULATION  MANAGEMENT-Home Monitor Managed by Exception    Matt TAY oRdriguez 69 year old male is on warfarin with therapeutic INR result. (Goal INR 2.0-3.0)    Recent labs: (last 7 days)     11/15/24  0853   INR 2.5       Previous INR was Therapeutic  Medication, diet, health changes since last INR:chart reviewed; none identified  Contacted within the last 12 weeks by phone on 10/31/24  Last [facility] referral date: 08/21/2024      LISA Howellian was NOT contacted regarding therapeutic result today per home monitoring policy manage by exception agreement.   Current warfarin dose is to be continued:     Summary  As of 11/15/2024      Full warfarin instructions:  7.5 mg every Fri; 5 mg all other days   Next INR check:  11/22/2024             ?   Zaida Nevarez RN  Anticoagulation Clinic  11/15/2024    _______________________________________________________________________     Anticoagulation Episode Summary       Current INR goal:  2.0-3.0   TTR:  88.9% (1 y)   Target end date:  Indefinite   Send INR reminders to:  ANTICOGUILHERME HOME MONITORING    Indications    Thrombophilia (H) [D68.59]  Long term (current) use of anticoagulants [Z79.01]  History of pulmonary embolism [Z86.711]             Comments:  [employer] home meter, MBE             Anticoagulation Care Providers       Provider Role Specialty Phone number    Dl Allen MD Referring Family Medicine [phone]

## 2024-11-23 LAB — INR HOME MONITORING: 2.4 RATIO (ref 2–3)

## 2024-11-25 ENCOUNTER — DOCUMENTATION ONLY (OUTPATIENT)
Dept: ANTICOAGULATION | Facility: CLINIC | Age: 69
End: 2024-11-25
Payer: COMMERCIAL

## 2024-11-25 DIAGNOSIS — D68.59 THROMBOPHILIA (H): Primary | ICD-10-CM

## 2024-11-25 DIAGNOSIS — Z79.01 LONG TERM (CURRENT) USE OF ANTICOAGULANTS: ICD-10-CM

## 2024-11-25 DIAGNOSIS — Z86.711 HISTORY OF PULMONARY EMBOLISM: ICD-10-CM

## 2024-11-25 NOTE — PROGRESS NOTES
ANTICOAGULATION  MANAGEMENT-Home Monitor Managed by Exception    Matt Rodriguez 69 year old male is on warfarin with therapeutic INR result. (Goal INR 2.0-3.0)    Recent labs: (last 7 days)     11/23/24  1126   INR 2.4       Previous INR was Therapeutic  Medication, diet, health changes since last INR:chart reviewed; none identified  Contacted within the last 12 weeks by phone on 10/31/24 (~ 12 weeks: 1/23/25)  Last [facility] referral date: 08/21/2024      LISA Gutierrez was NOT contacted regarding therapeutic result today per home monitoring policy manage by exception agreement.   Current warfarin dose is to be continued:     Summary  As of 11/25/2024      Full warfarin instructions:  7.5 mg every Fri; 5 mg all other days   Next INR check:  11/29/2024             ?   Aarti Jenkins, RN  Anticoagulation Clinic  11/25/2024    _______________________________________________________________________     Anticoagulation Episode Summary       Current INR goal:  2.0-3.0   TTR:  88.9% (1 y)   Target end date:  Indefinite   Send INR reminders to:  ANTICOAG HOME MONITORING    Indications    Thrombophilia (H) [D68.59]  Long term (current) use of anticoagulants [Z79.01]  History of pulmonary embolism [Z86.711]             Comments:  [employer] home meter, MBE             Anticoagulation Care Providers       Provider Role Specialty Phone number    Dl Allen MD Referring Family Medicine [phone]

## 2024-12-11 ENCOUNTER — OFFICE VISIT (OUTPATIENT)
Dept: FAMILY MEDICINE | Facility: CLINIC | Age: 69
End: 2024-12-11
Payer: COMMERCIAL

## 2024-12-11 VITALS
WEIGHT: 178.3 LBS | HEART RATE: 62 BPM | OXYGEN SATURATION: 97 % | HEIGHT: 72 IN | RESPIRATION RATE: 16 BRPM | TEMPERATURE: 97.7 F | SYSTOLIC BLOOD PRESSURE: 127 MMHG | DIASTOLIC BLOOD PRESSURE: 79 MMHG | BODY MASS INDEX: 24.15 KG/M2

## 2024-12-11 DIAGNOSIS — G25.81 RESTLESS LEGS: ICD-10-CM

## 2024-12-11 DIAGNOSIS — Z00.00 ENCOUNTER FOR ROUTINE ADULT HEALTH EXAMINATION WITHOUT ABNORMAL FINDINGS: Primary | ICD-10-CM

## 2024-12-11 DIAGNOSIS — Z79.01 LONG TERM (CURRENT) USE OF ANTICOAGULANTS: ICD-10-CM

## 2024-12-11 DIAGNOSIS — Z86.711 HISTORY OF PULMONARY EMBOLISM: ICD-10-CM

## 2024-12-11 DIAGNOSIS — Z13.1 SCREENING FOR DIABETES MELLITUS: ICD-10-CM

## 2024-12-11 DIAGNOSIS — Z71.1 CONCERN ABOUT SKIN CANCER WITHOUT DIAGNOSIS: ICD-10-CM

## 2024-12-11 DIAGNOSIS — Z12.5 SCREENING FOR PROSTATE CANCER: ICD-10-CM

## 2024-12-11 DIAGNOSIS — R68.82 LOW LIBIDO: ICD-10-CM

## 2024-12-11 DIAGNOSIS — E78.2 MIXED HYPERLIPIDEMIA: ICD-10-CM

## 2024-12-11 DIAGNOSIS — D68.59 THROMBOPHILIA (H): ICD-10-CM

## 2024-12-11 LAB
ALT SERPL W P-5'-P-CCNC: 22 U/L (ref 0–70)
ANION GAP SERPL CALCULATED.3IONS-SCNC: 9 MMOL/L (ref 7–15)
BUN SERPL-MCNC: 20.9 MG/DL (ref 8–23)
CALCIUM SERPL-MCNC: 8.9 MG/DL (ref 8.8–10.4)
CHLORIDE SERPL-SCNC: 105 MMOL/L (ref 98–107)
CHOLEST SERPL-MCNC: 221 MG/DL
CREAT SERPL-MCNC: 0.87 MG/DL (ref 0.67–1.17)
EGFRCR SERPLBLD CKD-EPI 2021: >90 ML/MIN/1.73M2
ERYTHROCYTE [DISTWIDTH] IN BLOOD BY AUTOMATED COUNT: 12.7 % (ref 10–15)
FASTING STATUS PATIENT QL REPORTED: YES
FASTING STATUS PATIENT QL REPORTED: YES
FERRITIN SERPL-MCNC: 95 NG/ML (ref 31–409)
GLUCOSE SERPL-MCNC: 83 MG/DL (ref 70–99)
HCO3 SERPL-SCNC: 28 MMOL/L (ref 22–29)
HCT VFR BLD AUTO: 42.1 % (ref 40–53)
HDLC SERPL-MCNC: 54 MG/DL
HGB BLD-MCNC: 14.3 G/DL (ref 13.3–17.7)
IRON BINDING CAPACITY (ROCHE): 290 UG/DL (ref 240–430)
IRON SATN MFR SERPL: 38 % (ref 15–46)
IRON SERPL-MCNC: 111 UG/DL (ref 61–157)
LDLC SERPL CALC-MCNC: 130 MG/DL
MCH RBC QN AUTO: 30.5 PG (ref 26.5–33)
MCHC RBC AUTO-ENTMCNC: 34 G/DL (ref 31.5–36.5)
MCV RBC AUTO: 90 FL (ref 78–100)
NONHDLC SERPL-MCNC: 167 MG/DL
PLATELET # BLD AUTO: 170 10E3/UL (ref 150–450)
POTASSIUM SERPL-SCNC: 4.1 MMOL/L (ref 3.4–5.3)
PSA SERPL DL<=0.01 NG/ML-MCNC: 3.4 NG/ML (ref 0–4.5)
RBC # BLD AUTO: 4.69 10E6/UL (ref 4.4–5.9)
SHBG SERPL-SCNC: 42 NMOL/L (ref 11–80)
SODIUM SERPL-SCNC: 142 MMOL/L (ref 135–145)
TRIGL SERPL-MCNC: 183 MG/DL
WBC # BLD AUTO: 5.2 10E3/UL (ref 4–11)

## 2024-12-11 PROCEDURE — 36415 COLL VENOUS BLD VENIPUNCTURE: CPT | Performed by: FAMILY MEDICINE

## 2024-12-11 PROCEDURE — G0103 PSA SCREENING: HCPCS | Performed by: FAMILY MEDICINE

## 2024-12-11 PROCEDURE — 85027 COMPLETE CBC AUTOMATED: CPT | Performed by: FAMILY MEDICINE

## 2024-12-11 PROCEDURE — 84270 ASSAY OF SEX HORMONE GLOBUL: CPT | Performed by: FAMILY MEDICINE

## 2024-12-11 PROCEDURE — 82728 ASSAY OF FERRITIN: CPT | Performed by: FAMILY MEDICINE

## 2024-12-11 PROCEDURE — 80048 BASIC METABOLIC PNL TOTAL CA: CPT | Performed by: FAMILY MEDICINE

## 2024-12-11 PROCEDURE — 83540 ASSAY OF IRON: CPT | Performed by: FAMILY MEDICINE

## 2024-12-11 PROCEDURE — 84460 ALANINE AMINO (ALT) (SGPT): CPT | Performed by: FAMILY MEDICINE

## 2024-12-11 PROCEDURE — 83550 IRON BINDING TEST: CPT | Performed by: FAMILY MEDICINE

## 2024-12-11 PROCEDURE — 80061 LIPID PANEL: CPT | Performed by: FAMILY MEDICINE

## 2024-12-11 RX ORDER — MULTIVIT WITH MINERALS/LUTEIN
1 TABLET ORAL DAILY
COMMUNITY

## 2024-12-11 SDOH — HEALTH STABILITY: PHYSICAL HEALTH: ON AVERAGE, HOW MANY DAYS PER WEEK DO YOU ENGAGE IN MODERATE TO STRENUOUS EXERCISE (LIKE A BRISK WALK)?: 3 DAYS

## 2024-12-11 ASSESSMENT — ENCOUNTER SYMPTOMS
COUGH: 0
BACK PAIN: 0
SORE THROAT: 0
SEIZURES: 0
VOMITING: 0
COLOR CHANGE: 0
ARTHRALGIAS: 0
FEVER: 0
EYE PAIN: 0
PALPITATIONS: 0
HEMATURIA: 0
SHORTNESS OF BREATH: 0
CHILLS: 0
ABDOMINAL PAIN: 0
DYSURIA: 0

## 2024-12-11 ASSESSMENT — SOCIAL DETERMINANTS OF HEALTH (SDOH): HOW OFTEN DO YOU GET TOGETHER WITH FRIENDS OR RELATIVES?: THREE TIMES A WEEK

## 2024-12-11 NOTE — PATIENT INSTRUCTIONS
Check you records regarding TDaP and pneumococcal vaccines.       Patient Education   Preventive Care Advice   This is general advice given by our system to help you stay healthy. However, your care team may have specific advice just for you. Please talk to your care team about your preventive care needs.  Nutrition  Eat 5 or more servings of fruits and vegetables each day.  Try wheat bread, brown rice and whole grain pasta (instead of white bread, rice, and pasta).  Get enough calcium and vitamin D. Check the label on foods and aim for 100% of the RDA (recommended daily allowance).  Lifestyle  Exercise at least 150 minutes each week  (30 minutes a day, 5 days a week).  Do muscle strengthening activities 2 days a week. These help control your weight and prevent disease.  No smoking.  Wear sunscreen to prevent skin cancer.  Have a dental exam and cleaning every 6 months.  Yearly exams  See your health care team every year to talk about:  Any changes in your health.  Any medicines your care team has prescribed.  Preventive care, family planning, and ways to prevent chronic diseases.  Shots (vaccines)   HPV shots (up to age 26), if you've never had them before.  Hepatitis B shots (up to age 59), if you've never had them before.  COVID-19 shot: Get this shot when it's due.  Flu shot: Get a flu shot every year.  Tetanus shot: Get a tetanus shot every 10 years.  Pneumococcal, hepatitis A, and RSV shots: Ask your care team if you need these based on your risk.  Shingles shot (for age 50 and up)  General health tests  Diabetes screening:  Starting at age 35, Get screened for diabetes at least every 3 years.  If you are younger than age 35, ask your care team if you should be screened for diabetes.  Cholesterol test: At age 39, start having a cholesterol test every 5 years, or more often if advised.  Bone density scan (DEXA): At age 50, ask your care team if you should have this scan for osteoporosis (brittle  bones).  Hepatitis C: Get tested at least once in your life.  STIs (sexually transmitted infections)  Before age 24: Ask your care team if you should be screened for STIs.  After age 24: Get screened for STIs if you're at risk. You are at risk for STIs (including HIV) if:  You are sexually active with more than one person.  You don't use condoms every time.  You or a partner was diagnosed with a sexually transmitted infection.  If you are at risk for HIV, ask about PrEP medicine to prevent HIV.  Get tested for HIV at least once in your life, whether you are at risk for HIV or not.  Cancer screening tests  Cervical cancer screening: If you have a cervix, begin getting regular cervical cancer screening tests starting at age 21.  Breast cancer scan (mammogram): If you've ever had breasts, begin having regular mammograms starting at age 40. This is a scan to check for breast cancer.  Colon cancer screening: It is important to start screening for colon cancer at age 45.  Have a colonoscopy test every 10 years (or more often if you're at risk) Or, ask your provider about stool tests like a FIT test every year or Cologuard test every 3 years.  To learn more about your testing options, visit:   .  For help making a decision, visit:   https://bit.ly/bp04476.  Prostate cancer screening test: If you have a prostate, ask your care team if a prostate cancer screening test (PSA) at age 55 is right for you.  Lung cancer screening: If you are a current or former smoker ages 50 to 80, ask your care team if ongoing lung cancer screenings are right for you.  For informational purposes only. Not to replace the advice of your health care provider. Copyright   2023 [employer]. All rights reserved. Clinically reviewed by the [facility] Transitions Program. XYZE [id number] - REV 01/24.  Hearing Loss: Care Instructions  Overview     Hearing loss is a sudden or slow decrease in how well you hear. It can range from  slight to profound. Permanent hearing loss can occur with aging. It also can happen when you are exposed long-term to loud noise. Examples include listening to loud music, riding motorcycles, or being around other loud machines.  Hearing loss can affect your work and home life. It can make you feel lonely or depressed. You may feel that you have lost your independence. But hearing aids and other devices can help you hear better and feel connected to others.  Follow-up care is a key part of your treatment and safety. Be sure to make and go to all appointments, and call your doctor if you are having problems. It's also a good idea to know your test results and keep a list of the medicines you take.  How can you care for yourself at home?  Avoid loud noises whenever possible. This helps keep your hearing from getting worse.  Always wear hearing protection around loud noises.  Wear a hearing aid as directed.  A professional can help you pick a hearing aid that will work best for you.  You can also get hearing aids over the counter for mild to moderate hearing loss.  Have hearing tests as your doctor suggests. They can show whether your hearing has changed. Your hearing aid may need to be adjusted.  Use other devices as needed. These may include:  Telephone amplifiers and hearing aids that can connect to a television, stereo, radio, or microphone.  Devices that use lights or vibrations. These alert you to the doorbell, a ringing telephone, or a baby monitor.  Television closed-captioning. This shows the words at the bottom of the screen. Most new TVs can do this.  TTY (text telephone). This lets you type messages back and forth on the telephone instead of talking or listening. These devices are also called TDD. When messages are typed on the keyboard, they are sent over the phone line to a receiving TTY. The message is shown on a monitor.  Use text messaging, social media, and email if it is hard for you to communicate  "by telephone.  Try to learn a listening technique called speechreading. It is not lipreading. You pay attention to people's gestures, expressions, posture, and tone of voice. These clues can help you understand what a person is saying. Face the person you are talking to, and have them face you. Make sure the lighting is good. You need to see the other person's face clearly.  Think about counseling if you need help to adjust to your hearing loss.  When should you call for help?  Watch closely for changes in your health, and be sure to contact your doctor if:    You think your hearing is getting worse.     You have new symptoms, such as dizziness or nausea.   Where can you learn more?  Go to https://www.[employer].net/patiented  Enter R798 in the search box to learn more about \"Hearing Loss: Care Instructions.\"  Current as of: September 27, 2023  Content Version: 14.2 2024 [employer].   Care instructions adapted under license by your healthcare professional. If you have questions about a medical condition or this instruction, always ask your healthcare professional. [employer] disclaims any warranty or liability for your use of this information.    Learning About Stress  What is stress?     Stress is your body's response to a hard situation. Your body can have a physical, emotional, or mental response. Stress is a fact of life for most people, and it affects everyone differently. What causes stress for you may not be stressful for someone else.  A lot of things can cause stress. You may feel stress when you go on a job interview, take a test, or run a race. This kind of short-term stress is normal and even useful. It can help you if you need to work hard or react quickly. For example, stress can help you finish an important job on time.  Long-term stress is caused by ongoing stressful situations or events. Examples of long-term stress include long-term health problems, ongoing problems at " work, or conflicts in your family. Long-term stress can harm your health.  How does stress affect your health?  When you are stressed, your body responds as though you are in danger. It makes hormones that speed up your heart, make you breathe faster, and give you a burst of energy. This is called the fight-or-flight stress response. If the stress is over quickly, your body goes back to normal and no harm is done.  But if stress happens too often or lasts too long, it can have bad effects. Long-term stress can make you more likely to get sick, and it can make symptoms of some diseases worse. If you tense up when you are stressed, you may develop neck, shoulder, or low back pain. Stress is linked to high blood pressure and heart disease.  Stress also harms your emotional health. It can make you portillo, tense, or depressed. Your relationships may suffer, and you may not do well at work or school.  What can you do to manage stress?  You can try these things to help manage stress:   Do something active. Exercise or activity can help reduce stress. Walking is a great way to get started. Even everyday activities such as housecleaning or yard work can help.  Try yoga or lamin chi. These techniques combine exercise and meditation. You may need some training at first to learn them.  Do something you enjoy. For example, listen to music or go to a movie. Practice your hobby or do volunteer work.  Meditate. This can help you relax, because you are not worrying about what happened before or what may happen in the future.  Do guided imagery. Imagine yourself in any setting that helps you feel calm. You can use online videos, books, or a teacher to guide you.  Do breathing exercises. For example:  From a standing position, bend forward from the waist with your knees slightly bent. Let your arms dangle close to the floor.  Breathe in slowly and deeply as you return to a standing position. Roll up slowly and lift your head last.  Hold  "your breath for just a few seconds in the standing position.  Breathe out slowly and bend forward from the waist.  Let your feelings out. Talk, laugh, cry, and express anger when you need to. Talking with supportive friends or family, a counselor, or a malcom leader about your feelings is a healthy way to relieve stress. Avoid discussing your feelings with people who make you feel worse.  Write. It may help to write about things that are bothering you. This helps you find out how much stress you feel and what is causing it. When you know this, you can find better ways to cope.  What can you do to prevent stress?  You might try some of these things to help prevent stress:  Manage your time. This helps you find time to do the things you want and need to do.  Get enough sleep. Your body recovers from the stresses of the day while you are sleeping.  Get support. Your family, friends, and community can make a difference in how you experience stress.  Limit your news feed. Avoid or limit time on social media or news that may make you feel stressed.  Do something active. Exercise or activity can help reduce stress. Walking is a great way to get started.  Where can you learn more?  Go to https://www.[employer].net/patiented  Enter N032 in the search box to learn more about \"Learning About Stress.\"  Current as of: October 24, 2023  Content Version: 14.2 2024 [employer].   Care instructions adapted under license by your healthcare professional. If you have questions about a medical condition or this instruction, always ask your healthcare professional. [employer] disclaims any warranty or liability for your use of this information.       "

## 2024-12-11 NOTE — ASSESSMENT & PLAN NOTE
Annual exam.  Overall, his situation and lifestyle is stable.  His job is mostly sedentary.  He tries to eat well but recognizes that he probably does not get enough protein (wife is vegetarian).  No specific pains.  He wonders if he should see a dermatologist.  His libido has changed over the last several years.  He has some restlessness at night.  - Check testosterone level  - Check iron studies given restless leg symptoms  -Heart disease risk: ASCVD rescore is approximately 15%.  However he has had a 0 score on a CT coronary calcium scan.  He had a CT scan of his chest in 2023 which did not show any calcifications.  For now check lipids but anticipate not starting statin for primary prevention of heart disease.  - He does have obstructive symptoms of the lower urinary tract.  Some softening of erections.  Consider trial of tadalafil.  PSA testing has been stable.  Prostatic megaly seen on CT scan in 2023.

## 2024-12-11 NOTE — PROGRESS NOTES
Preventive Care Visit  [facility] FLAQUITA Allen MD, Family Medicine  Dec 11, 2024      Assessment & Plan   Problem List Items Addressed This Visit       Encounter for routine adult health examination without abnormal findings - Primary     Annual exam.  Overall, his situation and lifestyle is stable.  His job is mostly sedentary.  He tries to eat well but recognizes that he probably does not get enough protein (wife is vegetarian).  No specific pains.  He wonders if he should see a dermatologist.  His libido has changed over the last several years.  He has some restlessness at night.  - Check testosterone level  - Check iron studies given restless leg symptoms  -Heart disease risk: ASCVD rescore is approximately 15%.  However he has had a 0 score on a CT coronary calcium scan.  He had a CT scan of his chest in 2023 which did not show any calcifications.  For now check lipids but anticipate not starting statin for primary prevention of heart disease.  - He does have obstructive symptoms of the lower urinary tract.  Some softening of erections.  Consider trial of tadalafil.  PSA testing has been stable.  Prostatic megaly seen on CT scan in 2023.         History of pulmonary embolism    Long term (current) use of anticoagulants    Low libido    Relevant Orders    Testosterone Free and Total    Restless legs syndrome    Thrombophilia (H)     Not an issue at this time.  He has an appointment with hematology in January.          Other Visit Diagnoses       Mixed hyperlipidemia        Relevant Orders    ALT    Lipid panel reflex to direct LDL Fasting    Screening for prostate cancer        Relevant Orders    PSA, screen    Screening for diabetes mellitus        Relevant Orders    Basic metabolic panel  (Ca, Cl, CO2, Creat, Gluc, K, Na, BUN)    ALT    Concern about skin cancer without diagnosis        Relevant Orders    Adult Dermatology  Referral           Patient has been advised of  split billing requirements and indicates understanding: Yes       Counseling  Appropriate preventive services were addressed with this patient via screening, questionnaire, or discussion as appropriate for fall prevention, nutrition, physical activity, Tobacco-use cessation, social engagement, weight loss and cognition.  Checklist reviewing preventive services available has been given to the patient.  Reviewed patient's diet, addressing concerns and/or questions.   He is at risk for lack of exercise and has been provided with information to increase physical activity for the benefit of his well-being.   He is at risk for psychosocial distress and has been provided with information to reduce risk.   The patient was provided with written information regarding signs of hearing loss.       Guevara Gutierrez is a 69 year old, presenting for the following:  Physical (Recommendation and possible referral to dermatologist.  Dry spot, hanging skin on certain area of the body.) and Imm/Inj        12/11/2024     7:43 AM   Additional Questions   Roomed by Ant Jessica MA   Accompanied by Self         12/11/2024     7:43 AM   Patient Reported Additional Medications   Patient reports taking the following new medications None          Annual exam:   - walks 5-10k steps most days, less recently.   - days are seated.  Works remote.    - finds himself to be restless at night without movement.     Imm/Inj  Pertinent negatives include no abdominal pain, arthralgias, chest pain, chills, coughing, fever, rash, sore throat or vomiting.     Health Care Directive  Patient does not have a Health Care Directive: Discussed advance care planning with patient; information given to patient to review.      12/11/2024   General Health   How would you rate your overall physical health? Good   Feel stress (tense, anxious, or unable to sleep) To some extent      (!) STRESS CONCERN      12/11/2024   Nutrition   Diet: Regular (no restrictions)             12/11/2024   Exercise   Days per week of moderate/strenous exercise 3 days            12/11/2024   Social Factors   Frequency of gathering with friends or relatives Three times a week   Worry food won't last until get money to buy more No   Food not last or not have enough money for food? No   Do you have housing? (Housing is defined as stable permanent housing and does not include staying ouside in a car, in a tent, in an abandoned building, in an overnight shelter, or couch-surfing.) Yes   Are you worried about losing your housing? No   Lack of transportation? No   Unable to get utilities (heat,electricity)? No            12/11/2024   Fall Risk   Fallen 2 or more times in the past year? No    Trouble with walking or balance? No        Patient-reported          12/11/2024   Activities of Daily Living- Home Safety   Needs help with the following daily activites None of the above   Safety concerns in the home None of the above            12/11/2024   Dental   Dentist two times every year? Yes            12/11/2024   Hearing Screening   Hearing concerns? (!) I NEED TO ASK PEOPLE TO SPEAK UP OR REPEAT THEMSELVES.    (!) IT'S HARD TO FOLLOW A CONVERSATION IN A NOISY RESTAURANT OR CROWDED ROOM.       Multiple values from one day are sorted in reverse-chronological order         12/11/2024   Driving Risk Screening   Patient/family members have concerns about driving No            12/11/2024   General Alertness/Fatigue Screening   Have you been more tired than usual lately? No            12/11/2024   Urinary Incontinence Screening   Bothered by leaking urine in past 6 months No            12/11/2024   TB Screening   Were you born outside of the [place]? No            Today's PHQ-2 Score:       12/11/2024     7:48 AM   PHQ-2 ( 1999 [employer])   Q1: Little interest or pleasure in doing things 0    Q2: Feeling down, depressed or hopeless 0    PHQ-2 Score 0    Q1: Little interest or pleasure in doing things Not at all   Q2: Feeling  down, depressed or hopeless Not at all   PHQ-2 Score 0       Patient-reported           12/11/2024   Substance Use   Alcohol more than 3/day or more than 7/wk No   Do you have a current opioid prescription? No   How severe/bad is pain from 1 to 10? 1/10   Do you use any other substances recreationally? No        Social History     Tobacco Use     Smoking status: Never     Smokeless tobacco: Never   Vaping Use     Vaping status: Never Used   Substance Use Topics     Alcohol use: Yes     Comment: 2 drinks per week     Drug use: Never           12/11/2024   AAA Screening   Family history of Abdominal Aortic Aneurysm (AAA)? Unsure      Last PSA:   Prostate Specific Antigen Screen   Date Value Ref Range Status   10/17/2023 1.82 0.00 - 4.50 ng/mL Final   10/12/2021 1.54 0.00 - 4.50 ug/L Final     ASCVD Risk   The 10-year ASCVD risk score (Ranulfo FELDER, et al., 2019) is: 15.6%    Values used to calculate the score:      Age: 69 years      Sex: Male      Is Non- : No      Diabetic: No      Tobacco smoker: No      Systolic Blood Pressure: 127 mmHg      Is BP treated: No      HDL Cholesterol: 59 mg/dL      Total Cholesterol: 230 mg/dL          Reviewed and updated as needed this visit by Provider   Tobacco  Allergies  Meds  Problems  Med Hx  Surg Hx  Fam Hx            Patient Active Problem List   Diagnosis     Colon polyp     Cyst of skin     Dermatofibroma     Thrombophilia (H)     Sacroiliac joint dysfunction     Encounter for routine adult health examination without abnormal findings     Long term (current) use of anticoagulants     History of pulmonary embolism     Restless legs syndrome     Low libido     Past Surgical History:   Procedure Laterality Date     COLONOSCOPY  2018     COLONOSCOPY N/A 11/30/2021    Procedure: COLONOSCOPY;  Surgeon: Rajeev Velazquez MD;  Location: [facility] OR     TONSILLECTOMY         Social History     Tobacco Use     Smoking status: Never      Smokeless tobacco: Never   Substance Use Topics     Alcohol use: Yes     Comment: 2 drinks per week     Family History   Problem Relation Age of Onset     Clotting Disorder Mother      Other - See Comments Mother         low blood pressure     Clotting Disorder Father      Hypertension Father      Hyperlipidemia Father      Coronary Artery Disease Father      Pancreatitis Sister      Other Cancer Sister      Cerebrovascular Disease Sister      Kidney Cancer Brother      Cerebrovascular Disease Brother      Other Cancer Brother      Cerebrovascular Disease Brother      Clotting Disorder Brother      Hypertension Brother      Clotting Disorder Brother      Hypertension Brother      Atrial fibrillation Brother      Atrial fibrillation Brother      No Known Problems Daughter      No Known Problems Daughter      Genetic Disorder Other      Anxiety Disorder No family hx of          Current providers sharing in care for this patient include:  Patient Care Team:  Dl Allen MD as PCP - General (Family Medicine)  Dl Allen MD as Assigned PCP  Conner Raygoza MD as Assigned Cancer Care Provider    The following health maintenance items are reviewed in Epic and correct as of today:  Health Maintenance   Topic Date Due     Pneumococcal Vaccine: 65+ Years (1 of 2 - PCV) Never done     DTAP/TDAP/TD IMMUNIZATION (1 - Tdap) Never done     RSV VACCINE (1 - Risk 60-74 years 1-dose series) Never done     MEDICARE ANNUAL WELLNESS VISIT  12/11/2025     ANNUAL REVIEW OF HM ORDERS  12/11/2025     FALL RISK ASSESSMENT  12/11/2025     GLUCOSE  10/17/2026     LIPID  10/17/2028     ADVANCE CARE PLANNING  12/11/2029     COLORECTAL CANCER SCREENING  11/30/2031     PHQ-2 (once per calendar year)  Completed     INFLUENZA VACCINE  Completed     ZOSTER IMMUNIZATION  Completed     COVID-19 Vaccine  Completed     HPV IMMUNIZATION  Aged Out     MENINGITIS IMMUNIZATION  Aged Out     RSV MONOCLONAL ANTIBODY  Aged Out      "HEPATITIS C SCREENING  Discontinued     Review of Systems   Constitutional:  Negative for chills and fever.   HENT:  Negative for ear pain and sore throat.    Eyes:  Negative for pain and visual disturbance.   Respiratory:  Negative for cough and shortness of breath.    Cardiovascular:  Negative for chest pain and palpitations.   Gastrointestinal:  Negative for abdominal pain and vomiting.   Genitourinary:  Negative for dysuria and hematuria.   Musculoskeletal:  Negative for arthralgias and back pain.   Skin:  Negative for color change and rash.   Neurological:  Negative for seizures and syncope.   All other systems reviewed and are negative.         Objective    Exam  /79 (BP Location: Left arm, Patient Position: Sitting, Cuff Size: Adult Regular)   Pulse 62   Temp 97.7  F (36.5  C) (Oral)   Resp 16   Ht 1.816 m (5' 11.5\")   Wt 80.9 kg (178 lb 4.8 oz)   SpO2 97%   BMI 24.52 kg/m     Estimated body mass index is 24.52 kg/m  as calculated from the following:    Height as of this encounter: 1.816 m (5' 11.5\").    Weight as of this encounter: 80.9 kg (178 lb 4.8 oz).    Physical Exam  Vitals reviewed.   Constitutional:       General: He is not in acute distress.     Appearance: Normal appearance. He is not ill-appearing.   HENT:      Head: Normocephalic and atraumatic.      Right Ear: External ear normal.      Left Ear: External ear normal.      Nose: Nose normal.      Mouth/Throat:      Pharynx: Oropharynx is clear. No oropharyngeal exudate or posterior oropharyngeal erythema.   Eyes:      General: No scleral icterus.        Right eye: No discharge.         Left eye: No discharge.      Extraocular Movements: Extraocular movements intact.      Conjunctiva/sclera: Conjunctivae normal.      Pupils: Pupils are equal, round, and reactive to light.   Neck:      Comments: No thyromegaly.  Cardiovascular:      Rate and Rhythm: Normal rate and regular rhythm.      Heart sounds: Normal heart sounds. No murmur " heard.     No friction rub. No gallop.   Pulmonary:      Effort: Pulmonary effort is normal. No respiratory distress.      Breath sounds: Normal breath sounds. No wheezing or rales.   Abdominal:      General: There is no distension.      Palpations: Abdomen is soft. There is no mass.      Tenderness: There is no abdominal tenderness.   Musculoskeletal:         General: No signs of injury. Normal range of motion.      Cervical back: Normal range of motion.      Right lower leg: No edema.      Left lower leg: No edema.   Lymphadenopathy:      Cervical: No cervical adenopathy.   Skin:     General: Skin is warm.      Coloration: Skin is not jaundiced.      Findings: No rash.   Neurological:      General: No focal deficit present.      Mental Status: He is alert and oriented to person, place, and time.      Cranial Nerves: No cranial nerve deficit.      Deep Tendon Reflexes: Reflexes normal.   Psychiatric:         Mood and Affect: Mood normal.         12/11/2024   Mini Cog   Clock Draw Score 2 Normal   3 Item Recall 3 objects recalled   Mini Cog Total Score 5                Signed Electronically by: Dl Allen MD

## 2024-12-14 DIAGNOSIS — Z12.5 SCREENING FOR PROSTATE CANCER: ICD-10-CM

## 2024-12-14 DIAGNOSIS — R97.20 INCREASED PROSTATE SPECIFIC ANTIGEN (PSA) VELOCITY: Primary | ICD-10-CM

## 2024-12-15 LAB
TESTOST FREE SERPL-MCNC: 6.42 NG/DL
TESTOST SERPL-MCNC: 371 NG/DL (ref 240–950)

## 2024-12-16 DIAGNOSIS — R68.82 LOW LIBIDO: Primary | ICD-10-CM

## 2024-12-28 LAB — INR HOME MONITORING: 2.4 RATIO (ref 2–3)

## 2024-12-30 ENCOUNTER — DOCUMENTATION ONLY (OUTPATIENT)
Dept: ANTICOAGULATION | Facility: CLINIC | Age: 69
End: 2024-12-30
Payer: COMMERCIAL

## 2024-12-30 DIAGNOSIS — D68.59 THROMBOPHILIA (H): Primary | ICD-10-CM

## 2024-12-30 DIAGNOSIS — Z86.711 HISTORY OF PULMONARY EMBOLISM: ICD-10-CM

## 2024-12-30 DIAGNOSIS — Z79.01 LONG TERM (CURRENT) USE OF ANTICOAGULANTS: ICD-10-CM

## 2024-12-30 NOTE — PROGRESS NOTES
ANTICOAGULATION  MANAGEMENT-Home Monitor Managed by Exception    Matt TAY Rodriguez 69 year old male is on warfarin with therapeutic INR result. (Goal INR 2.0-3.0)    Recent labs: (last 7 days)     12/28/24  1133   INR 2.4       Previous INR was Therapeutic  Medication, diet, health changes since last INR:chart reviewed; none identified  Contacted within the last 12 weeks by phone on 10/31/24  Last [facility] referral date: 08/21/2024      LISA Howellian was NOT contacted regarding therapeutic result today per home monitoring policy manage by exception agreement.   Current warfarin dose is to be continued:     Summary  As of 12/30/2024      Full warfarin instructions:  7.5 mg every Fri; 5 mg all other days   Next INR check:  1/3/2025             ?   Zaida Nevarez RN  Anticoagulation Clinic  12/30/2024    _______________________________________________________________________     Anticoagulation Episode Summary       Current INR goal:  2.0-3.0   TTR:  88.9% (1 y)   Target end date:  Indefinite   Send INR reminders to:  [facility]    Indications    Thrombophilia (H) [D68.59]  Long term (current) use of anticoagulants [Z79.01]  History of pulmonary embolism [Z86.711]             Comments:  Selena home meter, MBE             Anticoagulation Care Providers       Provider Role Specialty Phone number    Dl Allen MD Referring Family Medicine [phone]

## 2025-01-11 LAB — INR HOME MONITORING: 2.1 RATIO (ref 2–3)

## 2025-01-13 ENCOUNTER — VIRTUAL VISIT (OUTPATIENT)
Dept: HEMATOLOGY | Facility: CLINIC | Age: 70
End: 2025-01-13
Attending: INTERNAL MEDICINE
Payer: COMMERCIAL

## 2025-01-13 ENCOUNTER — DOCUMENTATION ONLY (OUTPATIENT)
Dept: ANTICOAGULATION | Facility: CLINIC | Age: 70
End: 2025-01-13
Payer: COMMERCIAL

## 2025-01-13 DIAGNOSIS — Z79.01 LONG TERM (CURRENT) USE OF ANTICOAGULANTS: ICD-10-CM

## 2025-01-13 DIAGNOSIS — Z86.711 HISTORY OF PULMONARY EMBOLISM: Primary | ICD-10-CM

## 2025-01-13 DIAGNOSIS — Z79.01 CURRENT USE OF LONG TERM ANTICOAGULATION: ICD-10-CM

## 2025-01-13 DIAGNOSIS — Z86.711 HISTORY OF PULMONARY EMBOLISM: ICD-10-CM

## 2025-01-13 DIAGNOSIS — D68.59 THROMBOPHILIA: Primary | ICD-10-CM

## 2025-01-13 NOTE — PROGRESS NOTES
[facility]  [facility]  [address], [address], [place] [address]  Main: [phone], Fax: [phone]          Outpatient Clinic Visit  Date:  01/13/2025    NOTE:  This visit was conducted by video, with the patient's approval.  Patient location: Home  Provider location: Onsite      Matt Rodriguez is a 68 yo male with a history of unprovoked pulmonary embolism, here today to revisit his long-term anticoagulation plan.  We last saw him in January 2024.    Background history:  He has a history of unprovoked pulmonary embolism in November 2018. He was anticoagulated with rivaroxaban. However at some point, he changed to warfarin, apparently due to increased nuisance bleeding on rivaroxaban (primarily in the form of bright red blood per rectum related to hemorrhoids). He also had expressed concern about the lack of an antidote for rivaroxaban (although that is no longer the case). He has done well on warfarin without any problematic nuisance bleeding and without any more serious bleeding.    There is a family history of venous thrombosis. His mom had a DVT in the postpartum period. He has a brother who had what sounds like an unprovoked DVT. He has another brother with a clot in his leg, circumstances unclear.    Previous thrombophilia testing showed that he does not have factor V Leiden or the prothrombin gene mutation. He also does not have protein C, protein S, or antithrombin deficiency. There appears to have been some question of whether or not he may have antiphospholipid antibody syndrome. He had a positive lupus anticoagulant intermittently in the past, but the positive values appear to be due to the fact that he was tested while on rivaroxaban.  When testing was done off rivaroxaban in June 2019, a lupus inhibitor was not detected.   He also had negative cardiolipin antibody titers.    He has no other significant past medical history and his only  medication is warfarin.    Interval history:  Since his last visit, Matt reports that he continues to do well.  He added a multivitamin to his regimen.  He has had occasional bleeding from hemorrhoids and an occasional nosebleed, but these are not new and remain unchanged in frequency and severity.  No other nuisance bleeding.  He continues to tolerate warfarin without any difficulty, monitoring with a home INR monitor.      Physical exam:   He appears well.  Detailed exam not performed (video visit).    Labs:  Labs from December 2024 reviewed.  CBC is normal including a hemoglobin of 14.3.  Chemistry panel shows normal liver and renal function.    INR data over the last year reviewed.  Range is 1.8-3.9.  The maximum supratherapeutic value appears to be a single outlier.  The vast majority of his INR readings are in the target range of 2-3.      ASSESSMENT / PLAN:  1. Unprovoked pulmonary embolism, November 2018  2. Long-term anticoagulation, currently on warfarin  3. Family history of venous thrombosis, with no identified genetic thrombophilia    Overall Matt continues to do well on warfarin.  Long-term anticoagulation remains appropriate and we will continue with his current treatment plan.  We will revisit this annually, sooner with new questions or concerns.    Total time on date of encounter 30 minutes, including review of medical records and labs, video visit, and documentation.      Conner Raygoza MD  Professor of Medicine  Division of Hematology, Oncology, and Transplantation  Director, [facility]

## 2025-01-13 NOTE — PROGRESS NOTES
ANTICOAGULATION  MANAGEMENT-Home Monitor Managed by Exception    Matt CROSS Michael 69 year old male is on warfarin with therapeutic INR result. (Goal INR 2.0-3.0)    Recent labs: (last 7 days)     01/11/25  0852   INR 2.1       Previous INR was Therapeutic  Medication, diet, health changes since last INR:chart reviewed; none identified  Contacted within the last 12 weeks by phone on 10/31/24  Last [facility] referral date: 08/21/2024      LISA Howellian was NOT contacted regarding therapeutic result today per home monitoring policy manage by exception agreement.   Current warfarin dose is to be continued:     Summary  As of 1/13/2025      Full warfarin instructions:  7.5 mg every Fri; 5 mg all other days   Next INR check:  1/17/2025             ?   Shelbie Bruno RN  Anticoagulation Clinic  1/13/2025    _______________________________________________________________________     Anticoagulation Episode Summary       Current INR goal:  2.0-3.0   TTR:  88.9% (1 y)   Target end date:  Indefinite   Send INR reminders to:  [facility]    Indications    Thrombophilia [D68.59]  Long term (current) use of anticoagulants [Z79.01]  History of pulmonary embolism [Z86.711]             Comments:  Selena home meter, MBE             Anticoagulation Care Providers       Provider Role Specialty Phone number    Dl Allen MD Referring Family Medicine [phone]

## 2025-01-16 ENCOUNTER — LAB (OUTPATIENT)
Dept: LAB | Facility: CLINIC | Age: 70
End: 2025-01-16
Payer: COMMERCIAL

## 2025-01-16 DIAGNOSIS — Z12.5 SCREENING FOR PROSTATE CANCER: ICD-10-CM

## 2025-01-16 DIAGNOSIS — R97.20 INCREASED PROSTATE SPECIFIC ANTIGEN (PSA) VELOCITY: ICD-10-CM

## 2025-01-16 DIAGNOSIS — R68.82 LOW LIBIDO: ICD-10-CM

## 2025-01-16 LAB
PSA SERPL DL<=0.01 NG/ML-MCNC: 3.3 NG/ML (ref 0–4.5)
SHBG SERPL-SCNC: 46 NMOL/L (ref 11–80)

## 2025-01-19 LAB
TESTOST FREE SERPL-MCNC: 5.5 NG/DL
TESTOST SERPL-MCNC: 341 NG/DL (ref 240–950)

## 2025-01-29 ENCOUNTER — TRANSFERRED RECORDS (OUTPATIENT)
Dept: HEALTH INFORMATION MANAGEMENT | Facility: CLINIC | Age: 70
End: 2025-01-29
Payer: COMMERCIAL

## 2025-02-14 ENCOUNTER — ANTICOAGULATION THERAPY VISIT (OUTPATIENT)
Dept: ANTICOAGULATION | Facility: CLINIC | Age: 70
End: 2025-02-14
Payer: COMMERCIAL

## 2025-02-14 DIAGNOSIS — D68.59 THROMBOPHILIA: Primary | ICD-10-CM

## 2025-02-14 DIAGNOSIS — Z86.711 HISTORY OF PULMONARY EMBOLISM: ICD-10-CM

## 2025-02-14 DIAGNOSIS — Z79.01 LONG TERM (CURRENT) USE OF ANTICOAGULANTS: ICD-10-CM

## 2025-02-14 NOTE — PROGRESS NOTES
ANTICOAGULATION MANAGEMENT     Matt Rodriguez 69 year old male is on warfarin with supratherapeutic INR result. (Goal INR 2.0-3.0)    Recent labs: (last 7 days)     02/14/25  1122   INR 3.3*       ASSESSMENT     Source(s): Chart Review  Previous INR was Therapeutic last 2(+) visits  Medication, diet, health changes since last INR chart reviewed; none identified         PLAN     Unable to reach Matt today.    Left message to take reduced dose of warfarin, 5 mg tonight. Request call back for assessment.  MyChart sent     Follow up required to confirm warfarin dose taken and assess for changes and discuss out of range result     Virginia Alcaraz, RN  2/14/2025  Anticoagulation Clinic  [facility] for routing messages: john [facility]  [facility] patient phone line: [phone]

## 2025-02-17 NOTE — PROGRESS NOTES
ANTICOAGULATION MANAGEMENT     Matt Rodriguez 69 year old male is on warfarin with supratherapeutic INR result. (Goal INR 2.0-3.0)    Recent labs: (last 7 days)     02/14/25  1122   INR 3.3*       ASSESSMENT     Source(s): Chart Review  Previous INR was Therapeutic last 2(+) visits  Medication, diet, health changes since last INR chart reviewed; none identified         PLAN     Recommended plan for no diet, medication or health factor changes affecting INR     Dosing Instructions: Continue your current warfarin dose with next INR in 4 days       Summary  As of 2/14/2025      Full warfarin instructions:  2/14: 5 mg; Otherwise 7.5 mg every Fri; 5 mg all other days   Next INR check:  2/21/2025               Detailed voice message left for Matt with dosing instructions and follow up date.     Patient to recheck with home meter    Education provided: Please call back if any changes to your diet, medications or how you've been taking warfarin    Plan made per [facility] anticoagulation protocol    Tania Liraino RN  2/17/2025  Anticoagulation Clinic  [facility] for routing messages: john [facility]  [facility] patient phone line: [phone]        _______________________________________________________________________     Anticoagulation Episode Summary       Current INR goal:  2.0-3.0   TTR:  88.0% (1 y)   Target end date:  Indefinite   Send INR reminders to:  [facility]    Indications    Thrombophilia [D68.59]  Long term (current) use of anticoagulants [Z79.01]  History of pulmonary embolism [Z86.711]             Comments:  Selena home meter, MBE             Anticoagulation Care Providers       Provider Role Specialty Phone number    Dl Allen MD Referring Family Medicine [phone]

## 2025-03-25 ENCOUNTER — DOCUMENTATION ONLY (OUTPATIENT)
Dept: ANTICOAGULATION | Facility: CLINIC | Age: 70
End: 2025-03-25
Payer: COMMERCIAL

## 2025-03-25 DIAGNOSIS — Z79.01 LONG TERM (CURRENT) USE OF ANTICOAGULANTS: ICD-10-CM

## 2025-03-25 DIAGNOSIS — Z86.711 HISTORY OF PULMONARY EMBOLISM: ICD-10-CM

## 2025-03-25 DIAGNOSIS — D68.59 THROMBOPHILIA: Primary | ICD-10-CM

## 2025-03-25 LAB — INR HOME MONITORING: 2.5 RATIO (ref 2–3)

## 2025-03-25 NOTE — PROGRESS NOTES
ANTICOAGULATION  MANAGEMENT-Home Monitor Managed by Exception    Matt TAY Rodriguez 69 year old male is on warfarin with therapeutic INR result. (Goal INR 2.0-3.0)    Recent labs: (last 7 days)     03/25/25  0801   INR 2.5       Previous INR was Therapeutic  Medication, diet, health changes since last INR:chart reviewed; none identified  Contacted within the last 12 weeks by phone on 2/28/25  Last [facility] referral date: 08/21/2024      LISA     Matt was NOT contacted regarding therapeutic result today per home monitoring policy manage by exception agreement.   Current warfarin dose is to be continued:     Summary  As of 3/25/2025      Full warfarin instructions:  7.5 mg every Fri; 5 mg all other days   Next INR check:  4/1/2025             ?   Ketty Myers RN  Anticoagulation Clinic  3/25/2025    _______________________________________________________________________     Anticoagulation Episode Summary       Current INR goal:  2.0-3.0   TTR:  88.4% (1 y)   Target end date:  Indefinite   Send INR reminders to:  [facility]    Indications    Thrombophilia [D68.59]  Long term (current) use of anticoagulants [Z79.01]  History of pulmonary embolism [Z86.711]             Comments:  Selena home meter, MBE             Anticoagulation Care Providers       Provider Role Specialty Phone number    Dl Allen MD Referring Family Medicine [phone]

## 2025-04-02 ENCOUNTER — DOCUMENTATION ONLY (OUTPATIENT)
Dept: ANTICOAGULATION | Facility: CLINIC | Age: 70
End: 2025-04-02
Payer: COMMERCIAL

## 2025-04-02 DIAGNOSIS — Z79.01 LONG TERM (CURRENT) USE OF ANTICOAGULANTS: ICD-10-CM

## 2025-04-02 DIAGNOSIS — Z86.711 HISTORY OF PULMONARY EMBOLISM: ICD-10-CM

## 2025-04-02 DIAGNOSIS — D68.59 THROMBOPHILIA: Primary | ICD-10-CM

## 2025-04-02 LAB — INR HOME MONITORING: 2.7 RATIO (ref 2–3)

## 2025-04-02 NOTE — PROGRESS NOTES
ANTICOAGULATION  MANAGEMENT-Home Monitor Managed by Exception    Matt CROSS Michael 69 year old male is on warfarin with therapeutic INR result. (Goal INR 2.0-3.0)    Recent labs: (last 7 days)     04/02/25  0735   INR 2.7       Previous INR was Therapeutic  Medication, diet, health changes since last INR:chart reviewed; none identified  Contacted within the last 12 weeks by phone on 2/28/25  Last [facility] referral date: 08/21/2024      LISA Howellian was NOT contacted regarding therapeutic result today per home monitoring policy manage by exception agreement.   Current warfarin dose is to be continued:     Summary  As of 4/2/2025      Full warfarin instructions:  7.5 mg every Fri; 5 mg all other days   Next INR check:  4/9/2025             ?   Virginia Alcaraz RN  Anticoagulation Clinic  4/2/2025    _______________________________________________________________________     Anticoagulation Episode Summary       Current INR goal:  2.0-3.0   TTR:  88.4% (1 y)   Target end date:  Indefinite   Send INR reminders to:  [employer]    Indications    Thrombophilia [D68.59]  Long term (current) use of anticoagulants [Z79.01]  History of pulmonary embolism [Z86.711]             Comments:  [employer] home meter, MBE             Anticoagulation Care Providers       Provider Role Specialty Phone number    Dl Allen MD Referring Family Medicine [phone]

## 2025-04-09 ENCOUNTER — DOCUMENTATION ONLY (OUTPATIENT)
Dept: ANTICOAGULATION | Facility: CLINIC | Age: 70
End: 2025-04-09
Payer: COMMERCIAL

## 2025-04-09 DIAGNOSIS — Z79.01 LONG TERM (CURRENT) USE OF ANTICOAGULANTS: ICD-10-CM

## 2025-04-09 DIAGNOSIS — Z86.711 HISTORY OF PULMONARY EMBOLISM: ICD-10-CM

## 2025-04-09 DIAGNOSIS — D68.59 THROMBOPHILIA: Primary | ICD-10-CM

## 2025-04-09 LAB — INR HOME MONITORING: 2.6 RATIO (ref 2–3)

## 2025-04-14 ENCOUNTER — DOCUMENTATION ONLY (OUTPATIENT)
Dept: ANTICOAGULATION | Facility: CLINIC | Age: 70
End: 2025-04-14
Payer: COMMERCIAL

## 2025-04-14 DIAGNOSIS — Z86.711 HISTORY OF PULMONARY EMBOLISM: ICD-10-CM

## 2025-04-14 DIAGNOSIS — Z79.01 LONG TERM (CURRENT) USE OF ANTICOAGULANTS: ICD-10-CM

## 2025-04-14 DIAGNOSIS — D68.59 THROMBOPHILIA: Primary | ICD-10-CM

## 2025-04-14 LAB — INR HOME MONITORING: 2.5 RATIO (ref 2–3)

## 2025-04-14 NOTE — PROGRESS NOTES
ANTICOAGULATION  MANAGEMENT-Home Monitor Managed by Exception    Matt TAY Rodriguez 69 year old male is on warfarin with therapeutic INR result. (Goal INR 2.0-3.0)    Recent labs: (last 7 days)     04/14/25  0854   INR 2.5       Previous INR was Therapeutic  Medication, diet, health changes since last INR:chart reviewed; none identified  Contacted within the last 12 weeks by phone on 2/28/25   Due for next call no later than: 5/29/25.   Last [facility] referral date: 08/21/2024      LISA     Matt was NOT contacted regarding therapeutic result today per home monitoring policy manage by exception agreement.   Current warfarin dose is to be continued:     Summary  As of 4/14/2025      Full warfarin instructions:  7.5 mg every Fri; 5 mg all other days   Next INR check:  4/21/2025             ?   Jany Mast RN  Anticoagulation Clinic  4/14/2025    _______________________________________________________________________     Anticoagulation Episode Summary       Current INR goal:  2.0-3.0   TTR:  88.4% (1 y)   Target end date:  Indefinite   Send INR reminders to:  [employer] [facility]    Indications    Thrombophilia [D68.59]  Long term (current) use of anticoagulants [Z79.01]  History of pulmonary embolism [Z86.711]             Comments:  [employer] home meter, MBE             Anticoagulation Care Providers       Provider Role Specialty Phone number    Dl Allen MD Referring Family Medicine [phone]

## 2025-04-19 LAB — INR HOME MONITORING: 2.6 RATIO (ref 2–3)

## 2025-04-21 ENCOUNTER — DOCUMENTATION ONLY (OUTPATIENT)
Dept: ANTICOAGULATION | Facility: CLINIC | Age: 70
End: 2025-04-21
Payer: COMMERCIAL

## 2025-04-21 NOTE — PROGRESS NOTES
ANTICOAGULATION  MANAGEMENT-Home Monitor Managed by Exception    Matt Rodriguez 69 year old male is on warfarin with therapeutic INR result. (Goal INR 2.0-3.0)    Recent labs: (last 7 days)     04/19/25  1121   INR 2.6       Previous INR was Therapeutic  Medication, diet, health changes since last INR:chart reviewed; none identified  Contacted within the last 12 weeks by phone on 2/28/25. Due for call ~5/29/25  Last [facility] referral date: 08/21/2024      LISA     Matt was NOT contacted regarding therapeutic result today per home monitoring policy manage by exception agreement.   Current warfarin dose is to be continued:     Summary  As of 4/21/2025      Full warfarin instructions:  7.5 mg every Fri; 5 mg all other days   Next INR check:  4/28/2025             ?   Ashley Gillespie RN  Anticoagulation Clinic  4/21/2025    _______________________________________________________________________     Anticoagulation Episode Summary       Current INR goal:  2.0-3.0   TTR:  88.4% (1 y)   Target end date:  Indefinite   Send INR reminders to:  [employer] [facility]    Indications    Thrombophilia [D68.59]  Long term (current) use of anticoagulants [Z79.01]  History of pulmonary embolism [Z86.711]             Comments:  [employer] home meter, MBE             Anticoagulation Care Providers       Provider Role Specialty Phone number    Dl Allen MD Referring Family Medicine [phone]

## 2025-05-01 ENCOUNTER — DOCUMENTATION ONLY (OUTPATIENT)
Dept: ANTICOAGULATION | Facility: CLINIC | Age: 70
End: 2025-05-01
Payer: COMMERCIAL

## 2025-05-01 DIAGNOSIS — Z79.01 LONG TERM (CURRENT) USE OF ANTICOAGULANTS: ICD-10-CM

## 2025-05-01 DIAGNOSIS — Z86.711 HISTORY OF PULMONARY EMBOLISM: ICD-10-CM

## 2025-05-01 DIAGNOSIS — D68.59 THROMBOPHILIA: Primary | ICD-10-CM

## 2025-05-01 LAB — INR HOME MONITORING: 2.9 RATIO (ref 2–3)

## 2025-05-01 NOTE — PROGRESS NOTES
ANTICOAGULATION  MANAGEMENT-Home Monitor Managed by Exception    Matt TAY Rodriguez 69 year old male is on warfarin with therapeutic INR result. (Goal INR 2.0-3.0)    Recent labs: (last 7 days)     05/01/25  0721   INR 2.9       Previous INR was Therapeutic  Medication, diet, health changes since last INR:chart reviewed; none identified  Contacted within the last 12 weeks by phone on  3/28/25   Due for next call no later than: 5/29/25.   Last [facility] referral date: 08/21/2024      LISA     Matt was NOT contacted regarding therapeutic result today per home monitoring policy manage by exception agreement.   Current warfarin dose is to be continued:     Summary  As of 5/1/2025      Full warfarin instructions:  7.5 mg every Fri; 5 mg all other days   Next INR check:  5/8/2025             ?   Jany Mast RN  Anticoagulation Clinic  5/1/2025//      _______________________________________________________________________     Anticoagulation Episode Summary       Current INR goal:  2.0-3.0   TTR:  89.0% (1 y)   Target end date:  Indefinite   Send INR reminders to:  [employer] [facility]    Indications    Thrombophilia [D68.59]  Long term (current) use of anticoagulants [Z79.01]  History of pulmonary embolism [Z86.711]             Comments:  [employer] home meter, MBE             Anticoagulation Care Providers       Provider Role Specialty Phone number    Dl Allen MD Referring Family Medicine [phone]

## 2025-05-18 LAB — INR HOME MONITORING: 3.6 RATIO (ref 2–3)

## 2025-05-19 ENCOUNTER — ANTICOAGULATION THERAPY VISIT (OUTPATIENT)
Dept: ANTICOAGULATION | Facility: CLINIC | Age: 70
End: 2025-05-19
Payer: COMMERCIAL

## 2025-05-19 ENCOUNTER — RESULTS FOLLOW-UP (OUTPATIENT)
Dept: ANTICOAGULATION | Facility: CLINIC | Age: 70
End: 2025-05-19

## 2025-05-19 DIAGNOSIS — Z86.711 HISTORY OF PULMONARY EMBOLISM: ICD-10-CM

## 2025-05-19 DIAGNOSIS — Z79.01 LONG TERM (CURRENT) USE OF ANTICOAGULANTS: ICD-10-CM

## 2025-05-19 DIAGNOSIS — D68.59 THROMBOPHILIA: Primary | ICD-10-CM

## 2025-05-19 NOTE — PROGRESS NOTES
ANTICOAGULATION MANAGEMENT     Matt Rodriguez 69 year old male is on warfarin with supratherapeutic INR result. (Goal INR 2.0-3.0)    Recent labs: (last 7 days)     05/18/25  0804   INR 3.6*       ASSESSMENT     Source(s): Chart Review and Patient/Caregiver Call     Warfarin doses taken: Warfarin taken as instructed  Diet: Decreased greens/vitamin K in diet; plans to resume previous intake  Medication/supplement changes: None noted  New illness, injury, or hospitalization: No  Signs or symptoms of bleeding or clotting: Yes: patient notes some blood on toilet paper when wiping after a bowel movement last week. Patient notes history of hemorrhoids, and states symptoms have since resolved. Writer advised patient should continue to monitor and report back is symptoms return. Will route to primary care provider as an FYI.  Previous result: Therapeutic last 2(+) visits  Additional findings: None       PLAN     Recommended plan for temporary change(s) affecting INR     Dosing Instructions: partial hold then continue your current warfarin dose with next INR in 1 week       Summary  As of 5/19/2025      Full warfarin instructions:  7.5 mg every Fri; 5 mg all other days   Next INR check:  --               Telephone call with Matt who verbalizes understanding and agrees to plan    Patient to recheck with home meter    Education provided: Please call back if any changes to your diet, medications or how you've been taking warfarin  Symptom monitoring: monitoring for bleeding signs and symptoms and monitoring for clotting signs and symptoms    Plan made per [facility] anticoagulation protocol    Zaida Nevarez RN  5/19/2025  Anticoagulation Clinic  [employer] for routing messages: john [employer]  [facility] patient phone line: [phone]        _______________________________________________________________________     Anticoagulation Episode Summary       Current INR goal:  2.0-3.0   TTR:  86.5% (1 y)   Target end date:   Indefinite   Send INR reminders to:  [facility]    Indications    Thrombophilia [D68.59]  Long term (current) use of anticoagulants [Z79.01]  History of pulmonary embolism [Z86.711]             Comments:  [employer] home meter, MBANNE-MARIE             Anticoagulation Care Providers       Provider Role Specialty Phone number    Dl Allen MD Referring Family Medicine [phone]

## 2025-05-21 ENCOUNTER — DOCUMENTATION ONLY (OUTPATIENT)
Dept: LAB | Facility: CLINIC | Age: 70
End: 2025-05-21
Payer: COMMERCIAL

## 2025-05-21 NOTE — CONFIDENTIAL NOTE
Spoke with patient. He reports he cannot remember why he had scheduled a lab appointment. Appointment cancelled.

## 2025-05-21 NOTE — PROGRESS NOTES
"Matt Rodriguez has an upcoming lab appointment:    Future Appointments   Date Time Provider Department Center   6/2/2025  7:30 AM [facility] LAB [facility] ST   12/12/2025  7:20 AM Dl Allen MD [facility]   1/19/2026  2:00 PM Conner Raygoza MD [facility]     Patient is scheduled for the following lab(s): \"labs st ores\" per appointment notes    There is no order available. Please review and place either future orders or HMPO (Review of Health Maintenance Protocol Orders), as appropriate.    There are no preventive care reminders to display for this patient.    Thank you,  Radha Severino   "

## 2025-05-24 LAB — INR HOME MONITORING: 2.6 RATIO (ref 2–3)

## 2025-05-27 ENCOUNTER — ANTICOAGULATION THERAPY VISIT (OUTPATIENT)
Dept: ANTICOAGULATION | Facility: CLINIC | Age: 70
End: 2025-05-27
Payer: COMMERCIAL

## 2025-05-27 DIAGNOSIS — Z79.01 LONG TERM (CURRENT) USE OF ANTICOAGULANTS: ICD-10-CM

## 2025-05-27 DIAGNOSIS — D68.59 THROMBOPHILIA: Primary | ICD-10-CM

## 2025-05-27 DIAGNOSIS — Z86.711 HISTORY OF PULMONARY EMBOLISM: ICD-10-CM

## 2025-05-27 NOTE — PROGRESS NOTES
ANTICOAGULATION MANAGEMENT     Matt Rodriguez 69 year old male is on warfarin with therapeutic INR result. (Goal INR 2.0-3.0)    Recent labs: (last 7 days)     05/24/25  0922   INR 2.6       ASSESSMENT     Source(s): Chart Review and Patient/Caregiver Call     Warfarin doses taken: Warfarin taken as instructed  Diet: No new diet changes identified  Medication/supplement changes: None noted  New illness, injury, or hospitalization: No  Signs or symptoms of bleeding or clotting: No  Previous result: Supratherapeutic  Additional findings: None       PLAN     Recommended plan for no diet, medication or health factor changes affecting INR     Dosing Instructions: Continue your current warfarin dose with next INR in 1 week       Summary  As of 5/27/2025      Full warfarin instructions:  7.5 mg every Fri; 5 mg all other days   Next INR check:  5/30/2025               Detailed voice message left for Matt with dosing instructions and follow up date.     Patient to recheck with home meter    Education provided: Please call back if any changes to your diet, medications or how you've been taking warfarin    Plan made per [facility] anticoagulation protocol    Ketty Myers RN  5/27/2025  Anticoagulation Clinic  [facility] for routing messages: p [facility]IT[facility]  [facility] patient phone line: [phone]        _______________________________________________________________________     Anticoagulation Episode Summary       Current INR goal:  2.0-3.0   TTR:  85.4% (1 y)   Target end date:  Indefinite   Send INR reminders to:  [facility]    Indications    Thrombophilia [D68.59]  Long term (current) use of anticoagulants [Z79.01]  History of pulmonary embolism [Z86.711]             Comments:  [employer] home meter, MBE             Anticoagulation Care Providers       Provider Role Specialty Phone number    Dl Allen MD Referring Family Medicine [phone]

## 2025-05-31 LAB — INR HOME MONITORING: 2.5 RATIO (ref 2–3)

## 2025-06-02 ENCOUNTER — RESULTS FOLLOW-UP (OUTPATIENT)
Dept: ANTICOAGULATION | Facility: CLINIC | Age: 70
End: 2025-06-02

## 2025-06-02 ENCOUNTER — ANTICOAGULATION THERAPY VISIT (OUTPATIENT)
Dept: ANTICOAGULATION | Facility: CLINIC | Age: 70
End: 2025-06-02

## 2025-06-02 DIAGNOSIS — D68.59 THROMBOPHILIA: Primary | ICD-10-CM

## 2025-06-02 DIAGNOSIS — Z86.711 HISTORY OF PULMONARY EMBOLISM: ICD-10-CM

## 2025-06-02 DIAGNOSIS — Z79.01 LONG TERM (CURRENT) USE OF ANTICOAGULANTS: ICD-10-CM

## 2025-06-02 NOTE — PROGRESS NOTES
ANTICOAGULATION MANAGEMENT     Matt Rodriguez 69 year old male is on warfarin with therapeutic INR result. (Goal INR 2.0-3.0)    Recent labs: (last 7 days)     05/31/25  0831   INR 2.5       ASSESSMENT     Source(s): Chart Review and Patient/Caregiver Call     Warfarin doses taken: Warfarin taken as instructed  Diet: Increased greens/vitamin K in diet; plans to resume previous intake and Change in alcohol intake may be affecting INR. Had a couple drinks over the weekend.    Medication/supplement changes: None noted  New illness, injury, or hospitalization: No  Signs or symptoms of bleeding or clotting: No  Previous result: Therapeutic last 2(+) visits  Additional findings: None       PLAN     Recommended plan for no diet, medication or health factor changes affecting INR     Dosing Instructions: Continue your current warfarin dose with next INR in 1 week       Summary  As of 6/2/2025      Full warfarin instructions:  7.5 mg every Fri; 5 mg all other days   Next INR check:  6/6/2025               Telephone call with Matt who verbalizes understanding and agrees to plan and who agrees to plan and repeated back plan correctly    Patient to recheck with home meter    Education provided: Please call back if any changes to your diet, medications or how you've been taking warfarin  Resume manage by exception with home monitor. Continue to submit INR results to home monitor company.You will only be called when your result is out of range and at 90 day check in. Please call and notify [facility] if new medication started, dose missed, signs or symptoms of bleeding or clotting, or a surgery/procedure is scheduled. Due for next call no later than: 8/31/25.    Plan made per [facility] anticoagulation protocol    Nishant Hood RN  6/2/2025  Anticoagulation Clinic  [facility] for routing messages: john [employer]  [facility] patient phone line: [phone]        _______________________________________________________________________      Anticoagulation Episode Summary       Current INR goal:  2.0-3.0   TTR:  85.5% (1 y)   Target end date:  Indefinite   Send INR reminders to:  [facility]    Indications    Thrombophilia [D68.59]  Long term (current) use of anticoagulants [Z79.01]  History of pulmonary embolism [Z86.711]             Comments:  [employer] home meter, FATOUMATA             Anticoagulation Care Providers       Provider Role Specialty Phone number    Dl Allen MD Referring Family Medicine [phone]

## 2025-06-06 ENCOUNTER — RESULTS FOLLOW-UP (OUTPATIENT)
Dept: ANTICOAGULATION | Facility: CLINIC | Age: 70
End: 2025-06-06

## 2025-06-14 LAB — INR HOME MONITORING: 2.7 RATIO (ref 2–3)

## 2025-06-16 ENCOUNTER — RESULTS FOLLOW-UP (OUTPATIENT)
Dept: ANTICOAGULATION | Facility: CLINIC | Age: 70
End: 2025-06-16

## 2025-06-16 ENCOUNTER — DOCUMENTATION ONLY (OUTPATIENT)
Dept: ANTICOAGULATION | Facility: CLINIC | Age: 70
End: 2025-06-16
Payer: COMMERCIAL

## 2025-06-16 DIAGNOSIS — Z79.01 LONG TERM (CURRENT) USE OF ANTICOAGULANTS: ICD-10-CM

## 2025-06-16 DIAGNOSIS — D68.59 THROMBOPHILIA: Primary | ICD-10-CM

## 2025-06-16 DIAGNOSIS — Z86.711 HISTORY OF PULMONARY EMBOLISM: ICD-10-CM

## 2025-06-16 NOTE — PROGRESS NOTES
ANTICOAGULATION  MANAGEMENT-Home Monitor Managed by Exception    Matt Rodriguez 70 year old male is on warfarin with therapeutic INR result. (Goal INR 2.0-3.0)    Recent labs: (last 7 days)     06/14/25  0800   INR 2.7       Previous INR was Therapeutic  Medication, diet, health changes since last INR:chart reviewed; none identified  Contacted within the last 12 weeks by phone on 6/2/25  Due for next call no later than: 8/31/25   Last [facility] referral date: 08/21/2024      LISA     Matt was NOT contacted regarding therapeutic result today per home monitoring policy manage by exception agreement.   Current warfarin dose is to be continued:     Summary  As of 6/16/2025      Full warfarin instructions:  7.5 mg every Fri; 5 mg all other days   Next INR check:  6/20/2025             ?   Zaida Nevarez RN  Anticoagulation Clinic  6/16/2025    _______________________________________________________________________     Anticoagulation Episode Summary       Current INR goal:  2.0-3.0   TTR:  86.7% (1 y)   Target end date:  Indefinite   Send INR reminders to:  [employer] [facility] [employer]    Indications    Thrombophilia [D68.59]  Long term (current) use of anticoagulants [Z79.01]  History of pulmonary embolism [Z86.711]             Comments:  [employer] home meter, MBE             Anticoagulation Care Providers       Provider Role Specialty Phone number    Dl Allen MD Referring Family Medicine [phone]

## 2025-06-28 LAB — INR HOME MONITORING: 2.9 RATIO (ref 2–3)

## 2025-06-30 ENCOUNTER — RESULTS FOLLOW-UP (OUTPATIENT)
Dept: ANTICOAGULATION | Facility: CLINIC | Age: 70
End: 2025-06-30

## 2025-06-30 ENCOUNTER — DOCUMENTATION ONLY (OUTPATIENT)
Dept: ANTICOAGULATION | Facility: CLINIC | Age: 70
End: 2025-06-30
Payer: COMMERCIAL

## 2025-06-30 DIAGNOSIS — Z86.711 HISTORY OF PULMONARY EMBOLISM: ICD-10-CM

## 2025-06-30 DIAGNOSIS — D68.59 THROMBOPHILIA: Primary | ICD-10-CM

## 2025-06-30 DIAGNOSIS — Z79.01 LONG TERM (CURRENT) USE OF ANTICOAGULANTS: ICD-10-CM

## 2025-06-30 NOTE — PROGRESS NOTES
ANTICOAGULATION  MANAGEMENT-Home Monitor Managed by Exception    Matt Rodriguez 70 year old male is on warfarin with therapeutic INR result. (Goal INR 2.0-3.0)    Recent labs: (last 7 days)     06/28/25  0835   INR 2.9       Previous INR was Therapeutic  Medication, diet, health changes since last INR:chart reviewed; none identified  Contacted within the last 12 weeks by phone on 6/2/25  Due for next call no later than: 8/31/25   Last [facility] referral date: 08/21/2024      LISA     Matt was NOT contacted regarding therapeutic result today per home monitoring policy manage by exception agreement.   Current warfarin dose is to be continued:     Summary  As of 6/30/2025      Full warfarin instructions:  7.5 mg every Fri; 5 mg all other days   Next INR check:  7/7/2025             ?   Zaida Nevarez RN  Anticoagulation Clinic  6/30/2025    _______________________________________________________________________     Anticoagulation Episode Summary       Current INR goal:  2.0-3.0   TTR:  86.7% (1 y)   Target end date:  Indefinite   Send INR reminders to:  [facility]    Indications    Thrombophilia [D68.59]  Long term (current) use of anticoagulants [Z79.01]  History of pulmonary embolism [Z86.711]             Comments:  [employer] home meter, MBE             Anticoagulation Care Providers       Provider Role Specialty Phone number    Dl Allen MD Referring Family Medicine [phone]

## 2025-07-06 LAB — INR HOME MONITORING: 4 RATIO (ref 2–3)

## 2025-07-07 ENCOUNTER — ANTICOAGULATION THERAPY VISIT (OUTPATIENT)
Dept: ANTICOAGULATION | Facility: CLINIC | Age: 70
End: 2025-07-07
Payer: COMMERCIAL

## 2025-07-07 DIAGNOSIS — Z86.711 HISTORY OF PULMONARY EMBOLISM: ICD-10-CM

## 2025-07-07 DIAGNOSIS — Z79.01 LONG TERM (CURRENT) USE OF ANTICOAGULANTS: ICD-10-CM

## 2025-07-07 DIAGNOSIS — D68.59 THROMBOPHILIA: Primary | ICD-10-CM

## 2025-07-14 ENCOUNTER — DOCUMENTATION ONLY (OUTPATIENT)
Dept: ANTICOAGULATION | Facility: CLINIC | Age: 70
End: 2025-07-14
Payer: COMMERCIAL

## 2025-07-14 ENCOUNTER — ANTICOAGULATION THERAPY VISIT (OUTPATIENT)
Dept: ANTICOAGULATION | Facility: CLINIC | Age: 70
End: 2025-07-14
Payer: COMMERCIAL

## 2025-07-14 DIAGNOSIS — D68.59 THROMBOPHILIA: Primary | ICD-10-CM

## 2025-07-14 DIAGNOSIS — Z79.01 LONG TERM (CURRENT) USE OF ANTICOAGULANTS: ICD-10-CM

## 2025-07-14 DIAGNOSIS — Z86.711 HISTORY OF PULMONARY EMBOLISM: ICD-10-CM

## 2025-07-14 LAB — INR HOME MONITORING: 2.2 RATIO (ref 2–3)

## 2025-07-14 NOTE — PROGRESS NOTES
ANTICOAGULATION CLINIC REFERRAL RENEWAL REQUEST       An annual renewal order is required for all patients referred to [facility] Anticoagulation Clinic.?  Please review and sign the pended referral order for Matt Rodriguez.       ANTICOAGULATION SUMMARY      Warfarin indication(s)   PE and Thrombophilia     Mechanical heart valve present?  NO       Current goal range   INR: 2.0-3.0     Goal appropriate for indication? Goal INR 2-3, standard for indication(s) above     Time in Therapeutic Range (TTR)  (Goal > 60%) 83.6%       Office visit with referring provider's group within last year yes on 12/11/2024   Additional standing orders None       Virginia Alcaraz RN  [facility] Anticoagulation Clinic

## 2025-07-14 NOTE — PROGRESS NOTES
ANTICOAGULATION MANAGEMENT     Matt Rodriguez 70 year old male is on warfarin with therapeutic INR result. (Goal INR 2.0-3.0)    Recent labs: (last 7 days)     07/14/25  1250   INR 2.2       ASSESSMENT     Source(s): Chart Review and Patient/Caregiver Call     Warfarin doses taken: Less warfarin taken than planned which may be affecting INR  Diet: No new diet changes identified  Medication/supplement changes: None noted  New illness, injury, or hospitalization: No  Signs or symptoms of bleeding or clotting: No  Previous result: Supratherapeutic  Additional findings: patient would like to retest on Friday to make sure INR does not rise again OR if MD needs to be decreased to 5mg daily.        PLAN     Recommended plan for temporary change(s) affecting INR     Dosing Instructions: Continue your current warfarin dose with next INR in 5 days       Summary  As of 7/14/2025      Full warfarin instructions:  7.5 mg every Fri; 5 mg all other days   Next INR check:  7/18/2025               Telephone call with Matt who verbalizes understanding and agrees to plan    Patient to recheck with home meter    Education provided: Importance of notifying anticoagulation clinic for: changes in medications; a sooner lab recheck maybe needed  Contact [phone] with any changes, questions or concerns.     Plan made per [facility] anticoagulation protocol    Virginia Alcaraz RN  7/14/2025  Anticoagulation Clinic  [employer] for routing messages: p ANTICOAG HOME [facility]ITOR[facility]  [facility] patient phone line: [phone]        _______________________________________________________________________     Anticoagulation Episode Summary       Current INR goal:  2.0-3.0   TTR:  83.6% (1 y)   Target end date:  Indefinite   Send INR reminders to:  [facility]    Indications    Thrombophilia [D68.59]  Long term (current) use of anticoagulants [Z79.01]  History of pulmonary embolism [Z86.711]             Comments:  [employer] home meter, FATOUMATA              Anticoagulation Care Providers       Provider Role Specialty Phone number    Dl Allen MD Referring Family Medicine [phone]

## 2025-07-22 DIAGNOSIS — D68.59 THROMBOPHILIA: ICD-10-CM

## 2025-07-22 RX ORDER — WARFARIN SODIUM 5 MG/1
TABLET ORAL
Qty: 114 TABLET | Refills: 1 | Status: SHIPPED | OUTPATIENT
Start: 2025-07-22

## 2025-07-22 NOTE — TELEPHONE ENCOUNTER
ANTICOAGULATION MANAGEMENT:  Medication Refill    Anticoagulation Summary  As of 7/18/2025      Warfarin maintenance plan:  7.5 mg (5 mg x 1.5) every Fri; 5 mg (5 mg x 1) all other days   Next INR check:  7/25/2025   Target end date:  Indefinite    Indications    Thrombophilia [D68.59]  Long term (current) use of anticoagulants [Z79.01]  History of pulmonary embolism [Z86.711]                 Anticoagulation Care Providers       Provider Role Specialty Phone number    Dl Allen MD Referring Family Medicine [phone]            Refill Criteria    Visit with referring provider/group: Meets criteria: visit within referring provider group in the last 15 months on 12/11/2024    [facility] referral last signed: 07/17/2025; within last year:  Yes    Lab monitoring is up to date (not exceeding 2 weeks overdue): Yes    Matt meets all criteria for refill. Rx instructions and quantity supplied updated to match patient's current dosing plan. Warfarin 90 day supply with 1 refill granted per [facility] protocol     Virginia Alcaraz RN  Anticoagulation Clinic

## 2025-08-16 LAB — INR HOME MONITORING: 3.1 RATIO (ref 2–3)

## 2025-08-18 ENCOUNTER — ANTICOAGULATION THERAPY VISIT (OUTPATIENT)
Dept: ANTICOAGULATION | Facility: CLINIC | Age: 70
End: 2025-08-18
Payer: COMMERCIAL

## 2025-08-18 ENCOUNTER — RESULTS FOLLOW-UP (OUTPATIENT)
Dept: ANTICOAGULATION | Facility: CLINIC | Age: 70
End: 2025-08-18
Payer: COMMERCIAL

## 2025-08-18 DIAGNOSIS — D68.59 THROMBOPHILIA: Primary | ICD-10-CM

## 2025-08-18 DIAGNOSIS — Z79.01 LONG TERM (CURRENT) USE OF ANTICOAGULANTS: ICD-10-CM

## 2025-08-18 DIAGNOSIS — Z86.711 HISTORY OF PULMONARY EMBOLISM: ICD-10-CM

## 2025-08-23 LAB — INR HOME MONITORING: 2.7 RATIO (ref 2–3)

## 2025-08-25 ENCOUNTER — ANTICOAGULATION THERAPY VISIT (OUTPATIENT)
Dept: ANTICOAGULATION | Facility: CLINIC | Age: 70
End: 2025-08-25
Payer: COMMERCIAL

## 2025-08-25 DIAGNOSIS — Z79.01 LONG TERM (CURRENT) USE OF ANTICOAGULANTS: ICD-10-CM

## 2025-08-25 DIAGNOSIS — D68.59 THROMBOPHILIA: Primary | ICD-10-CM

## 2025-08-25 DIAGNOSIS — Z86.711 HISTORY OF PULMONARY EMBOLISM: ICD-10-CM

## (undated) DEVICE — MMIS - SPONGE GAUZE 4X4IN RAYON POLY 4 PLY HI ABS

## (undated) DEVICE — MMIS - LINER SCT 1000CC CRD MDVC LOCK LID SHTOF VLV CNSTR

## (undated) DEVICE — MMIS - CONNECTOR IRR ERBEFLO CLEVERCAP ESCP PORT OLYMPUS

## (undated) DEVICE — MMIS - TUBING IRR ERBEFLOW SET CAP 24 HR ESCP PUMP DISP

## (undated) DEVICE — MMIS - JELLY LUB 2OZ SRGLB BCTRST H2O SOL SCR CAP TUBE

## (undated) DEVICE — MMIS - SYRINGE 50ML GRAD N-PYRG DEHP-FR PVC FREE STRL MED

## (undated) DEVICE — MMIS - TUBING SCT CLR 6FT 3/16IN MDVC NCDTV MALE TO MALE

## (undated) DEVICE — MMIS - FORCEPS BIOPSY SPK SERRATE SMTH FLXB SHTH 230CM

## (undated) DEVICE — MMIS - SNARE MINI OVAL 5.5CM 1.5CM 7FR ACSNR 1 PC SOFT

## (undated) DEVICE — Device

## (undated) DEVICE — MMIS - CANNULA NSL 14FT O2 TBG SOFT DESIGN CRSH RES OPTM